# Patient Record
Sex: FEMALE | Race: WHITE | NOT HISPANIC OR LATINO | Employment: FULL TIME | URBAN - METROPOLITAN AREA
[De-identification: names, ages, dates, MRNs, and addresses within clinical notes are randomized per-mention and may not be internally consistent; named-entity substitution may affect disease eponyms.]

---

## 2018-10-26 PROBLEM — R41.82 ALTERED MENTAL STATUS: Status: ACTIVE | Noted: 2018-10-26

## 2018-10-26 NOTE — PLAN OF CARE
"Please call extension [phone] (if nobody answers, this will flip to a beeper, so put in your call back number) upon patient arrival to floor for Hospital Medicine admit team assignment and for additional admit orders for the patient.  Do not page the attending, staff physician associate with the patient on arrival (may not be in-house at the time of arrival).  Rather, always call [phone] to reach the triage physician for orders and team assignment.    Outside Transfer Acceptance Note    Patients name: Vivian Peterson, MRN: [id number]    Transferring Physician or Mid-Level provider/Clinic giving report:  Dr. Brown in [facility]    Accepting Physician for admission to hospital: Den Kidd M.D.     Date of acceptance:   10/26/18    Reason for transfer:  Transient alteration of consciousness    PMH: Hypothyroidism, DM2, "Questionable seizure disorder"    Report from Physician/Mid-Level Provider:     HPI: Ms. Peterson is a 47yo lady who has been developing periods of staring off into space with "catatonic episodes," syncope, and speech disturbances.  She holds a PHD in education and is normally very high functioning.  These symptoms all began in February of this year.  She has had about 4 major episodes since then.  She was admitted at [facility] with a work up last month including MRI brain, CT head and LP - all normal.  She takes Topamax at home.  The episodes last about 10 minutes on average.  She now comes with one of the episodes and is found to have waxing and waning speech and awareness there.  Dr. Guadalupe with Neurology was consulted and felt that she needed to come to [facility] for evaluation.    VS: T 98.2F, P 97, RR 18, /76, 97% RA    Labs: "ALL LABS NORMAL"    Radiographs: NON-CONTRAST HEAD: NML    To Do List upon arrival:    1) Consult Neurology  2) 24 hour VEEG      "

## 2018-10-27 ENCOUNTER — HOSPITAL ENCOUNTER (INPATIENT)
Facility: HOSPITAL | Age: 48
LOS: 9 days | Discharge: HOME | DRG: 312 | End: 2018-11-05
Attending: INTERNAL MEDICINE | Admitting: INTERNAL MEDICINE
Payer: OTHER GOVERNMENT

## 2018-10-27 DIAGNOSIS — R56.9 SEIZURE-LIKE ACTIVITY: ICD-10-CM

## 2018-10-27 DIAGNOSIS — R41.82 ALTERED MENTAL STATUS, UNSPECIFIED ALTERED MENTAL STATUS TYPE: ICD-10-CM

## 2018-10-27 DIAGNOSIS — I95.1 ORTHOSTATIC SYNCOPE: Primary | ICD-10-CM

## 2018-10-27 DIAGNOSIS — R55 SYNCOPE: ICD-10-CM

## 2018-10-27 DIAGNOSIS — R41.82 ALTERED MENTAL STATUS: ICD-10-CM

## 2018-10-27 DIAGNOSIS — R40.4 TRANSIENT ALTERATION OF AWARENESS: ICD-10-CM

## 2018-10-27 PROBLEM — R73.03 PREDIABETES: Chronic | Status: ACTIVE | Noted: 2018-10-27

## 2018-10-27 LAB
25(OH)D3+25(OH)D2 SERPL-MCNC: 35 NG/ML
ALBUMIN SERPL BCP-MCNC: 4.3 G/DL
ALP SERPL-CCNC: 130 U/L
ALT SERPL W/O P-5'-P-CCNC: 21 U/L
ANION GAP SERPL CALC-SCNC: 8 MMOL/L
AST SERPL-CCNC: 13 U/L
BASOPHILS # BLD AUTO: 0.02 K/UL
BASOPHILS NFR BLD: 0.3 %
BILIRUB SERPL-MCNC: 0.4 MG/DL
BUN SERPL-MCNC: 15 MG/DL
CALCIUM SERPL-MCNC: 9.5 MG/DL
CHLORIDE SERPL-SCNC: 112 MMOL/L
CO2 SERPL-SCNC: 19 MMOL/L
CREAT SERPL-MCNC: 0.8 MG/DL
DIFFERENTIAL METHOD: NORMAL
EOSINOPHIL # BLD AUTO: 0.1 K/UL
EOSINOPHIL NFR BLD: 1 %
ERYTHROCYTE [DISTWIDTH] IN BLOOD BY AUTOMATED COUNT: 12.9 %
EST. GFR  (AFRICAN AMERICAN): >60 ML/MIN/1.73 M^2
EST. GFR  (NON AFRICAN AMERICAN): >60 ML/MIN/1.73 M^2
ESTIMATED AVG GLUCOSE: 111 MG/DL
FOLATE SERPL-MCNC: 11.8 NG/ML
GLUCOSE SERPL-MCNC: 90 MG/DL
HBA1C MFR BLD HPLC: 5.5 %
HCT VFR BLD AUTO: 41.8 %
HGB BLD-MCNC: 13.8 G/DL
IMM GRANULOCYTES # BLD AUTO: 0.01 K/UL
IMM GRANULOCYTES NFR BLD AUTO: 0.2 %
LYMPHOCYTES # BLD AUTO: 2 K/UL
LYMPHOCYTES NFR BLD: 34.2 %
MAGNESIUM SERPL-MCNC: 2.2 MG/DL
MCH RBC QN AUTO: 29.8 PG
MCHC RBC AUTO-ENTMCNC: 33 G/DL
MCV RBC AUTO: 90 FL
MONOCYTES # BLD AUTO: 0.4 K/UL
MONOCYTES NFR BLD: 7.5 %
NEUTROPHILS # BLD AUTO: 3.3 K/UL
NEUTROPHILS NFR BLD: 56.8 %
NRBC BLD-RTO: 0 /100 WBC
PHOSPHATE SERPL-MCNC: 3.4 MG/DL
PLATELET # BLD AUTO: 179 K/UL
PMV BLD AUTO: 10.7 FL
POCT GLUCOSE: 138 MG/DL (ref 70–110)
POCT GLUCOSE: 73 MG/DL (ref 70–110)
POCT GLUCOSE: 79 MG/DL (ref 70–110)
POTASSIUM SERPL-SCNC: 3.8 MMOL/L
PROT SERPL-MCNC: 7.5 G/DL
RBC # BLD AUTO: 4.63 M/UL
RPR SER QL: NORMAL
SODIUM SERPL-SCNC: 139 MMOL/L
TSH SERPL DL<=0.005 MIU/L-ACNC: 2.11 UIU/ML
VIT B12 SERPL-MCNC: 438 PG/ML
WBC # BLD AUTO: 5.87 K/UL

## 2018-10-27 PROCEDURE — 85025 COMPLETE CBC W/AUTO DIFF WBC: CPT

## 2018-10-27 PROCEDURE — 99223 1ST HOSP IP/OBS HIGH 75: CPT | Mod: ,,, | Performed by: PHYSICIAN ASSISTANT

## 2018-10-27 PROCEDURE — A4216 STERILE WATER/SALINE, 10 ML: HCPCS | Performed by: PHYSICIAN ASSISTANT

## 2018-10-27 PROCEDURE — 84207 ASSAY OF VITAMIN B-6: CPT

## 2018-10-27 PROCEDURE — 11000001 HC ACUTE MED/SURG PRIVATE ROOM

## 2018-10-27 PROCEDURE — 36415 COLL VENOUS BLD VENIPUNCTURE: CPT

## 2018-10-27 PROCEDURE — 86146 BETA-2 GLYCOPROTEIN ANTIBODY: CPT

## 2018-10-27 PROCEDURE — 25500020 PHARM REV CODE 255: Performed by: INTERNAL MEDICINE

## 2018-10-27 PROCEDURE — 83735 ASSAY OF MAGNESIUM: CPT

## 2018-10-27 PROCEDURE — 84591 ASSAY OF NOS VITAMIN: CPT

## 2018-10-27 PROCEDURE — 25000003 PHARM REV CODE 250: Performed by: PHYSICIAN ASSISTANT

## 2018-10-27 PROCEDURE — 82746 ASSAY OF FOLIC ACID SERUM: CPT

## 2018-10-27 PROCEDURE — 99232 SBSQ HOSP IP/OBS MODERATE 35: CPT | Mod: ,,, | Performed by: INTERNAL MEDICINE

## 2018-10-27 PROCEDURE — 82607 VITAMIN B-12: CPT

## 2018-10-27 PROCEDURE — 80201 ASSAY OF TOPIRAMATE: CPT

## 2018-10-27 PROCEDURE — 84439 ASSAY OF FREE THYROXINE: CPT

## 2018-10-27 PROCEDURE — 80053 COMPREHEN METABOLIC PANEL: CPT

## 2018-10-27 PROCEDURE — 99223 1ST HOSP IP/OBS HIGH 75: CPT | Mod: ,,, | Performed by: PSYCHIATRY & NEUROLOGY

## 2018-10-27 PROCEDURE — 82306 VITAMIN D 25 HYDROXY: CPT

## 2018-10-27 PROCEDURE — 84443 ASSAY THYROID STIM HORMONE: CPT

## 2018-10-27 PROCEDURE — 84425 ASSAY OF VITAMIN B-1: CPT

## 2018-10-27 PROCEDURE — A9585 GADOBUTROL INJECTION: HCPCS | Performed by: INTERNAL MEDICINE

## 2018-10-27 PROCEDURE — 83036 HEMOGLOBIN GLYCOSYLATED A1C: CPT

## 2018-10-27 PROCEDURE — 86592 SYPHILIS TEST NON-TREP QUAL: CPT

## 2018-10-27 PROCEDURE — 84100 ASSAY OF PHOSPHORUS: CPT

## 2018-10-27 RX ORDER — TOPIRAMATE 100 MG/1
100 TABLET, FILM COATED ORAL DAILY
Status: ON HOLD | COMMUNITY
End: 2018-11-05 | Stop reason: HOSPADM

## 2018-10-27 RX ORDER — IBUPROFEN 200 MG
16 TABLET ORAL
Status: DISCONTINUED | OUTPATIENT
Start: 2018-10-27 | End: 2018-11-05 | Stop reason: HOSPADM

## 2018-10-27 RX ORDER — LOPERAMIDE HYDROCHLORIDE 2 MG/1
2 CAPSULE ORAL
Status: DISCONTINUED | OUTPATIENT
Start: 2018-10-27 | End: 2018-11-05 | Stop reason: HOSPADM

## 2018-10-27 RX ORDER — GLUCAGON 1 MG
1 KIT INJECTION
Status: DISCONTINUED | OUTPATIENT
Start: 2018-10-27 | End: 2018-11-05 | Stop reason: HOSPADM

## 2018-10-27 RX ORDER — INSULIN ASPART 100 [IU]/ML
0-5 INJECTION, SOLUTION INTRAVENOUS; SUBCUTANEOUS
Status: DISCONTINUED | OUTPATIENT
Start: 2018-10-27 | End: 2018-11-05 | Stop reason: HOSPADM

## 2018-10-27 RX ORDER — TOPIRAMATE 100 MG/1
150 TABLET, FILM COATED ORAL NIGHTLY
Status: ON HOLD | COMMUNITY
End: 2018-11-05 | Stop reason: HOSPADM

## 2018-10-27 RX ORDER — AMITRIPTYLINE HYDROCHLORIDE 100 MG/1
100 TABLET ORAL NIGHTLY
Status: ON HOLD | COMMUNITY
End: 2018-11-05 | Stop reason: HOSPADM

## 2018-10-27 RX ORDER — ASPIRIN 81 MG/1
81 TABLET ORAL DAILY
Status: DISCONTINUED | OUTPATIENT
Start: 2018-10-27 | End: 2018-11-05 | Stop reason: HOSPADM

## 2018-10-27 RX ORDER — OMEPRAZOLE 40 MG/1
40 CAPSULE, DELAYED RELEASE ORAL DAILY
COMMUNITY
End: 2023-03-02 | Stop reason: SDUPTHER

## 2018-10-27 RX ORDER — METFORMIN HYDROCHLORIDE 500 MG/1
500 TABLET ORAL 2 TIMES DAILY WITH MEALS
COMMUNITY
End: 2023-01-04

## 2018-10-27 RX ORDER — TOPIRAMATE 100 MG/1
100 TABLET, FILM COATED ORAL DAILY
Status: DISCONTINUED | OUTPATIENT
Start: 2018-10-27 | End: 2018-10-30

## 2018-10-27 RX ORDER — PROMETHAZINE HYDROCHLORIDE 25 MG/1
25 TABLET ORAL EVERY 6 HOURS PRN
Status: DISCONTINUED | OUTPATIENT
Start: 2018-10-27 | End: 2018-11-05 | Stop reason: HOSPADM

## 2018-10-27 RX ORDER — ASPIRIN 81 MG/1
81 TABLET ORAL DAILY
COMMUNITY

## 2018-10-27 RX ORDER — ATORVASTATIN CALCIUM 20 MG/1
20 TABLET, FILM COATED ORAL NIGHTLY
Status: DISCONTINUED | OUTPATIENT
Start: 2018-10-27 | End: 2018-11-05 | Stop reason: HOSPADM

## 2018-10-27 RX ORDER — POLYETHYLENE GLYCOL 3350 17 G/17G
17 POWDER, FOR SOLUTION ORAL 3 TIMES DAILY PRN
Status: DISCONTINUED | OUTPATIENT
Start: 2018-10-27 | End: 2018-11-05 | Stop reason: HOSPADM

## 2018-10-27 RX ORDER — AMITRIPTYLINE HYDROCHLORIDE 25 MG/1
100 TABLET, FILM COATED ORAL NIGHTLY
Status: DISCONTINUED | OUTPATIENT
Start: 2018-10-27 | End: 2018-11-02

## 2018-10-27 RX ORDER — LEVOTHYROXINE SODIUM 50 UG/1
50 TABLET ORAL
Status: DISCONTINUED | OUTPATIENT
Start: 2018-10-27 | End: 2018-11-05 | Stop reason: HOSPADM

## 2018-10-27 RX ORDER — IPRATROPIUM BROMIDE AND ALBUTEROL SULFATE 2.5; .5 MG/3ML; MG/3ML
3 SOLUTION RESPIRATORY (INHALATION) EVERY 4 HOURS PRN
Status: DISCONTINUED | OUTPATIENT
Start: 2018-10-27 | End: 2018-11-05 | Stop reason: HOSPADM

## 2018-10-27 RX ORDER — DOCUSATE SODIUM 100 MG/1
100 CAPSULE, LIQUID FILLED ORAL 2 TIMES DAILY
Status: DISCONTINUED | OUTPATIENT
Start: 2018-10-27 | End: 2018-11-05 | Stop reason: HOSPADM

## 2018-10-27 RX ORDER — PANTOPRAZOLE SODIUM 40 MG/1
40 TABLET, DELAYED RELEASE ORAL NIGHTLY
Status: DISCONTINUED | OUTPATIENT
Start: 2018-10-27 | End: 2018-10-29

## 2018-10-27 RX ORDER — LORATADINE 10 MG/1
10 TABLET ORAL DAILY
COMMUNITY
End: 2023-01-04

## 2018-10-27 RX ORDER — PANTOPRAZOLE SODIUM 40 MG/1
40 TABLET, DELAYED RELEASE ORAL DAILY
Status: DISCONTINUED | OUTPATIENT
Start: 2018-10-27 | End: 2018-11-05 | Stop reason: HOSPADM

## 2018-10-27 RX ORDER — IBUPROFEN 200 MG
24 TABLET ORAL
Status: DISCONTINUED | OUTPATIENT
Start: 2018-10-27 | End: 2018-11-05 | Stop reason: HOSPADM

## 2018-10-27 RX ORDER — CETIRIZINE HYDROCHLORIDE 5 MG/1
10 TABLET ORAL DAILY
Status: DISCONTINUED | OUTPATIENT
Start: 2018-10-27 | End: 2018-11-05 | Stop reason: HOSPADM

## 2018-10-27 RX ORDER — GADOBUTROL 604.72 MG/ML
7 INJECTION INTRAVENOUS
Status: COMPLETED | OUTPATIENT
Start: 2018-10-27 | End: 2018-10-27

## 2018-10-27 RX ORDER — ATORVASTATIN CALCIUM 20 MG/1
20 TABLET, FILM COATED ORAL NIGHTLY
COMMUNITY

## 2018-10-27 RX ORDER — MONTELUKAST SODIUM 10 MG/1
10 TABLET ORAL NIGHTLY
Status: DISCONTINUED | OUTPATIENT
Start: 2018-10-27 | End: 2018-11-05 | Stop reason: HOSPADM

## 2018-10-27 RX ORDER — CHOLECALCIFEROL (VITAMIN D3) 25 MCG
1000 TABLET ORAL DAILY
Status: DISCONTINUED | OUTPATIENT
Start: 2018-10-27 | End: 2018-11-05 | Stop reason: HOSPADM

## 2018-10-27 RX ORDER — ACETAMINOPHEN 325 MG/1
650 TABLET ORAL EVERY 4 HOURS PRN
Status: DISCONTINUED | OUTPATIENT
Start: 2018-10-27 | End: 2018-11-05 | Stop reason: HOSPADM

## 2018-10-27 RX ORDER — CHOLECALCIFEROL (VITAMIN D3) 25 MCG
1000 TABLET ORAL DAILY
COMMUNITY
End: 2023-01-04

## 2018-10-27 RX ORDER — MONTELUKAST SODIUM 10 MG/1
10 TABLET ORAL NIGHTLY
COMMUNITY
End: 2023-01-04

## 2018-10-27 RX ORDER — OMEPRAZOLE 20 MG/1
20 CAPSULE, DELAYED RELEASE ORAL NIGHTLY
Status: ON HOLD | COMMUNITY
End: 2018-11-05 | Stop reason: HOSPADM

## 2018-10-27 RX ORDER — ONDANSETRON 8 MG/1
8 TABLET, ORALLY DISINTEGRATING ORAL EVERY 8 HOURS PRN
Status: DISCONTINUED | OUTPATIENT
Start: 2018-10-27 | End: 2018-11-05 | Stop reason: HOSPADM

## 2018-10-27 RX ORDER — LEVOTHYROXINE SODIUM 50 UG/1
50 TABLET ORAL
COMMUNITY

## 2018-10-27 RX ORDER — SODIUM CHLORIDE 0.9 % (FLUSH) 0.9 %
3 SYRINGE (ML) INJECTION EVERY 8 HOURS
Status: DISCONTINUED | OUTPATIENT
Start: 2018-10-27 | End: 2018-11-05 | Stop reason: HOSPADM

## 2018-10-27 RX ADMIN — TOPIRAMATE 150 MG: 100 TABLET, FILM COATED ORAL at 08:10

## 2018-10-27 RX ADMIN — ACETAMINOPHEN 650 MG: 325 TABLET, FILM COATED ORAL at 09:10

## 2018-10-27 RX ADMIN — PANTOPRAZOLE SODIUM 40 MG: 40 TABLET, DELAYED RELEASE ORAL at 05:10

## 2018-10-27 RX ADMIN — ASPIRIN 81 MG: 81 TABLET, COATED ORAL at 09:10

## 2018-10-27 RX ADMIN — TOPIRAMATE 150 MG: 100 TABLET, FILM COATED ORAL at 05:10

## 2018-10-27 RX ADMIN — Medication 3 ML: at 05:10

## 2018-10-27 RX ADMIN — GADOBUTROL 7 ML: 604.72 INJECTION INTRAVENOUS at 06:10

## 2018-10-27 RX ADMIN — LEVOTHYROXINE SODIUM 50 MCG: 50 TABLET ORAL at 05:10

## 2018-10-27 RX ADMIN — PANTOPRAZOLE SODIUM 40 MG: 40 TABLET, DELAYED RELEASE ORAL at 08:10

## 2018-10-27 RX ADMIN — AMITRIPTYLINE HYDROCHLORIDE 100 MG: 25 TABLET, FILM COATED ORAL at 08:10

## 2018-10-27 RX ADMIN — PANTOPRAZOLE SODIUM 40 MG: 40 TABLET, DELAYED RELEASE ORAL at 09:10

## 2018-10-27 RX ADMIN — ACETAMINOPHEN 650 MG: 325 TABLET, FILM COATED ORAL at 08:10

## 2018-10-27 RX ADMIN — DOCUSATE SODIUM 100 MG: 100 CAPSULE, LIQUID FILLED ORAL at 08:10

## 2018-10-27 RX ADMIN — DOCUSATE SODIUM 100 MG: 100 CAPSULE, LIQUID FILLED ORAL at 09:10

## 2018-10-27 RX ADMIN — Medication 3 ML: at 02:10

## 2018-10-27 RX ADMIN — TOPIRAMATE 100 MG: 100 TABLET, FILM COATED ORAL at 09:10

## 2018-10-27 RX ADMIN — ATORVASTATIN CALCIUM 20 MG: 20 TABLET, FILM COATED ORAL at 05:10

## 2018-10-27 RX ADMIN — Medication 3 ML: at 08:10

## 2018-10-27 RX ADMIN — VITAMIN D, TAB 1000IU (100/BT) 1000 UNITS: 25 TAB at 09:10

## 2018-10-27 RX ADMIN — CETIRIZINE HYDROCHLORIDE 10 MG: 5 TABLET ORAL at 09:10

## 2018-10-27 RX ADMIN — THERA TABS 1 TABLET: TAB at 09:10

## 2018-10-27 RX ADMIN — MONTELUKAST SODIUM 10 MG: 10 TABLET, FILM COATED ORAL at 08:10

## 2018-10-27 RX ADMIN — ATORVASTATIN CALCIUM 20 MG: 20 TABLET, FILM COATED ORAL at 08:10

## 2018-10-27 RX ADMIN — MONTELUKAST SODIUM 10 MG: 10 TABLET, FILM COATED ORAL at 05:10

## 2018-10-27 NOTE — ASSESSMENT & PLAN NOTE
- Unclear etiology at this time.  - Work-ups during previous hospitalizations, including EEG, CT head, MRI brain, and LP, all unremarkable.  - UA, urine tox screen at OSH negative.  - Will consult neurology, order EEG and labs.  - Fall precautions, seizure precautions, PT consult.  - Continue Topamax 100mg daily and 150mg qHS.  Will check levels.

## 2018-10-27 NOTE — MEDICAL/APP STUDENT
Hospital Medicine  Progress note    Patient Name: Vivian Peterson  MRN: [id number]  Team: [facility] D Elaina Dillon MD  Admit Date: 10/27/2018  Code status: Full Code    Principal Problem:  Transient alteration of awareness    Interval hx: Patient with no new complaints.    ROS   Respiratory: no cough or shortness of breath  Cardiovascular: no chest pain or palpitations  Gastrointestinal: no nausea or vomiting, no abdominal pain or change in bowel habits  Behavioral/Psych: no depression or anxiety      PEx  Temp:  [97.9 °F (36.6 °C)-98.7 °F (37.1 °C)]   Pulse:  [86-97]   Resp:  [14-18]   BP: (106-117)/(64-82)   SpO2:  [95 %-99 %]     Intake/Output Summary (Last 24 hours) at 10/27/2018 0924  Last data filed at 10/27/2018 0600  Gross per 24 hour   Intake 125 ml   Output 200 ml   Net -75 ml       General Appearance: no acute distress   Heart: regular rate and rhythm  Respiratory: Normal respiratory effort, no crackles   Abdomen: Soft, non-tender; bowel sounds active  Skin: intact.   Neurologic:  No focal numbness or weakness  Mental status: Alert. Delayed responses.     Recent Labs   Lab 10/27/18  0630   WBC 5.87   HGB 13.8   HCT 41.8        Recent Labs   Lab 10/27/18  0630      K 3.8   *   CO2 19*   BUN 15   CREATININE 0.8   GLU 90   CALCIUM 9.5   MG 2.2   PHOS 3.4     Recent Labs   Lab 10/27/18  0630   ALKPHOS 130   ALT 21   AST 13   ALBUMIN 4.3   PROT 7.5   BILITOT 0.4      Recent Labs   Lab 10/27/18  0537   POCTGLUCOSE 79       Scheduled Meds:   amitriptyline  100 mg Oral QHS    aspirin  81 mg Oral Daily    atorvastatin  20 mg Oral QHS    cetirizine  10 mg Oral Daily    docusate sodium  100 mg Oral BID    levothyroxine  50 mcg Oral Before breakfast    montelukast  10 mg Oral QHS    multivitamin  1 tablet Oral Daily    pantoprazole  40 mg Oral QHS    pantoprazole  40 mg Oral Daily    sodium chloride 0.9%  3 mL Intravenous Q8H    topiramate  100 mg Oral Daily    topiramate  150 mg  "Oral QHS    vitamin D  1,000 Units Oral Daily     Continuous Infusions:  As Needed:  acetaminophen, albuterol-ipratropium, dextrose 50%, dextrose 50%, glucagon (human recombinant), glucose, glucose, insulin aspart U-100, loperamide, ondansetron, polyethylene glycol, promethazine    Active Hospital Problems    Diagnosis  POA    *Transient alteration of awareness [R40.4]  Yes    Prediabetes [R73.03]  Yes     Chronic    Hypothyroidism due to Hashimoto's thyroiditis [E03.8, E06.3]  Yes     Chronic    HLD (hyperlipidemia) [E78.5]  Yes     Chronic    GERD (gastroesophageal reflux disease) [K21.9]  Yes     Chronic    Asthma [J45.909]  Yes     Chronic    Migraines [G43.909]  Yes     Chronic    MAURO (obstructive sleep apnea) [G47.33]  Yes      Resolved Hospital Problems   No resolved problems to display.       Overview  48 year old female with a h/o migraines, prediabetes on Metformin, GERD, asthma, HLD, hypothyroidism secondary to Hashimoto's, and MAURO who transferred from [facility] where she presented with L sided weakness, numbness, and "staring spells." Of note, patient's levothyroxine was discontinued in February and admitted for thyroid storm, after which levothyroxine was resumed with persisting mental status changes. Previous work-up at OSH reportedly included unremarkable MRI brain, CTH, EEG and LP.      Assessment and Plan for Problems addressed today:    Transient alteration of awareness  · Unclear etiology at this time.  · Work-ups during previous hospitalizations, reportedly included EEG, CT head, MRI brain, and LP, all unremarkable. UA, urine tox screen at OSH negative.  · Fall precautions, seizure precautions, PT/OT ordered.  · Topiramate levels ordered.    · Vit B12/Folate & Vit D WNL. RPR, B1, B3, B6 pending.    · Consulted neurology, ordered EEG.    Migraines  · Continued home amitriptyline 100mg qHS and topiramate 100mg daily and 150mg qHS.    Hypothyroidism due to Hashimoto's " thyroiditis  · Continued home levothyroxine 50mcg.  · TSH, T4 wnl.     Prediabetes  · Patient has not been taking her Metformin as she is waiting on a new prescription to come in the mail.  · HA1c: 5.5%   · Low dose insulin correction ordered.     Asthma  MAURO (obstructive sleep apnea)  · CPAP qHS.  · Continued home Singulair & montelukast.     Hyperlipidemia  GERD (gastroesophageal reflux disease)  · Continued home Lipitor 20mg qHS & pantoprazole 40mg daily.       Diet: Diet diabetic [facility]; 1500 Calorie  DVT Prophylaxis: TEDs/SCDs  Anticoagulants   Medication Route Frequency       L/D/A:  PIV    Discharge plan and follow up  Home or Self Care      Provider  Elaina Dillon MD  [facility]   Department of Hospital Medicine    Scribe Attestation: I personally scribed for Elaina Dillon MD on 10/27/2018 at 9:24 AM. Electronically signed by radha Macario on 10/27/2018 at 9:24 AM.

## 2018-10-27 NOTE — ASSESSMENT & PLAN NOTE
"49 y/o female presenting as a transfer from [facility] with unusual presentation of intermittent episodes over a span of 7 months involving multiple neurologic deficits (abnormal gait ranging from mild to severe/unable to walk, speech difficulty, "staring spells" closely described as a catatonic state?, syncope, decreased concentration/memory lapses). Patient has had an extensive work-up including imaging since her initial episode in February 2018; thus far, no abnormal findings have been reported. Differential diagnosis includes: epilepsy, recurrent seizures 2/2 underlying infectious process or metabolic derangement, neurodegenerative disorder, recurrent strokes. While always hesitant to claim as a diagnosis of exclusion, conversion disorder needs to be considered in this patient given her unusual presentation and inconsistent findings on physical exam, negative findings despite appropriate neurologic work-up so far, and her high-functioning baseline status.    Recommendations:  - repeat MRI Brain (as transfer records did not come with prior imaging other than head CT)  - MRI cervical and thoracic spine  - continuous 24 hr video-recording EEG   - No need for repeat lumbar puncture at this time as patient had negative results in September  - Fall precautions, given patient's severely unsteady gait  - Will continue to monitor for further management  - Feel free to reach out for further questions  "

## 2018-10-27 NOTE — CONSULTS
"[facility]  Neurology  Consult Note    Patient Name: Vivian Peterson  MRN: [id number]  Admission Date: 10/27/2018  Hospital Length of Stay: 0 days  Code Status: Full Code   Attending Provider: Elaina Dillon MD   Consulting Provider: Micah Schwartz MD  Primary Care Physician: Provider Notinsystem  Principal Problem:Transient alteration of awareness    Consults   Subjective:     Chief Complaint:  Transient alterations in awareness     HPI:   Patient is a 49 y/o female with a h/o migraines (on Topamax), prediabetes on Metformin, GERD, asthma, HLD, hypothyroidism secondary to Hashimoto's, and MAURO.  She was transferred from [facility] where she presented with L sided weakness, numbness, and "staring spells."   at bedside assists with HPI.  Patient began having episodes of L sided weakness, gait disturbance (L>R), tremors, "catatonic episodes," and syncope in February. At the time of her first episode, patient was living in [place]  had been abruptly taken off her thyroid medication by her endocrinologist.  She  presented then with acute L-sided weakness, numbness, and dysphasia and was admitted to the hospital for a presumed stroke. CT head/MRI was negative for any infarct, but patient proceeded to become aphasic and went into a catatonic state for nearly 45 minutes in which she was unresponsive and unable to follow commands before slowly returning to baseline over a few days. Due to an unclear etiology, patient's symptoms were thought to be due to a thyroid storm.  On discharge she was restarted on her home thyroid medication.  However, patient continued to intermittently have episodes in addition to intermittent worsening of her gait and speech difficulty. She  was being followed by neurology, but her neurologist moved and she was lost to follow-up. She has had multiple episodes since then, each lasting 10-20 minutes, and yet for a while had been able to return to work as " "a  from August-mid October. Her most recent episode was on September 22, when she presented to [facility] with syncope and gait disturbance.  Her work-up, including MRI brain, CT head, and LP, was unremarkable.  She was discharged on a higher dose of Topamax (100mg qAM and 150mg qHS).   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 She had another reported episode while in the ER prior to transfer. On exam, patient is oriented to situation, place, and time but with notably delayed responses.  When asked her full name, she is unable to answer; however she is able to answer if prompted for them separately. She denies chest pain, complains of chronic SOB secondary to asthma.  Denies dizziness, palpitations, fever/chills, N/V/D, abdominal pain.    General neurology was consulted for further work up and management for these apparent transient alterations in consciousness/intermittent neurologic deficits. It sounds like patient has already had extensive work-up over the past few months for this same presentation including imaging with CT Head, Brain MRI, multiple EEGs, and LP. No clear etiology has been discovered up to this point. [place] [facility] patient is hemodynamically stable. Lab work thus far has been grossly unremarkable. UA, urine toxicology screen at OSH wnl. TSH, Vit B12, and folate WNL.  RPR non-reactive. Vitamin B1, B3, B6 levels all pending. Transfer records only came with recent CT Head, thus will plan for continuous 24 hr EEG in hopes of picking up one of patient's "episodes", MRI Brain, MRI cervical and thoracic spine in attempt to identify definitve etiology.       Past Medical History:   Diagnosis Date    Asthma     GERD (gastroesophageal reflux disease)     HLD (hyperlipidemia)     " Hypothyroidism due to Hashimoto's thyroiditis     Migraines     MAURO (obstructive sleep apnea)     Prediabetes        Past Surgical History:   Procedure Laterality Date    SHOULDER SURGERY Left        Review of patient's allergies indicates:   Allergen Reactions    Penicillins Anaphylaxis    Sulfa (sulfonamide antibiotics) Anaphylaxis    Clarithromycin        Current Neurological Medications: Topamax    No current facility-administered medications on file prior to encounter.      Current Outpatient Medications on File Prior to Encounter   Medication Sig    amitriptyline (ELAVIL) 100 MG tablet Take 100 mg by mouth every evening.    aspirin (ECOTRIN) 81 MG EC tablet Take 81 mg by mouth once daily.    atorvastatin (LIPITOR) 20 MG tablet Take 20 mg by mouth every evening.    levothyroxine (SYNTHROID) 50 MCG tablet Take 50 mcg by mouth before breakfast.    linaclotide (LINZESS) 290 mcg Cap Take 290 mcg by mouth every other day.    loratadine (CLARITIN) 10 mg tablet Take 10 mg by mouth once daily.    metFORMIN (GLUCOPHAGE) 500 MG tablet Take 500 mg by mouth 2 (two) times daily with meals.    montelukast (SINGULAIR) 10 mg tablet Take 10 mg by mouth every evening.    multivitamin capsule Take 1 capsule by mouth once daily.    omeprazole (PRILOSEC) 20 MG capsule Take 20 mg by mouth every evening.    omeprazole (PRILOSEC) 40 MG capsule Take 40 mg by mouth once daily.    topiramate (TOPAMAX) 100 MG tablet Take 100 mg by mouth once daily.    topiramate (TOPAMAX) 100 MG tablet Take 150 mg by mouth every evening.    vitamin D (VITAMIN D3) 1000 units Tab Take 1,000 Units by mouth once daily.     Family History     Problem Relation (Age of Onset)    Cancer Mother        Tobacco Use    Smoking status: Never Smoker   Substance and Sexual Activity    Alcohol use: No     Frequency: Never    Drug use: Not on file    Sexual activity: Not on file     Review of Systems   Constitutional: Positive for activity change.  "Negative for chills, fatigue, fever and unexpected weight change.   HENT: Negative for mouth sores, sinus pain and sore throat.    Eyes: Negative for pain and visual disturbance.   Respiratory: Negative for cough, chest tightness and shortness of breath.    Cardiovascular: Negative for chest pain and palpitations.   Gastrointestinal: Negative for abdominal distention, abdominal pain, constipation, diarrhea, nausea and vomiting.   Genitourinary: Negative for difficulty urinating and dysuria.   Musculoskeletal: Positive for gait problem. Negative for back pain, myalgias, neck pain and neck stiffness.   Neurological: Positive for syncope, speech difficulty, weakness and numbness.        "staring spells", difficulty w/ memory   Psychiatric/Behavioral: Positive for confusion and decreased concentration. Negative for agitation.     Objective:     Vital Signs (Most Recent):  Temp: 97.9 °F (36.6 °C) (10/27/18 0422)  Pulse: 93 (10/27/18 1229)  Resp: 17 (10/27/18 1229)  BP: 119/83 (10/27/18 1229)  SpO2: 95 % (10/27/18 1229) Vital Signs (24h Range):  Temp:  [97.9 °F (36.6 °C)-98.7 °F (37.1 °C)] 97.9 °F (36.6 °C)  Pulse:  [86-97] 93  Resp:  [14-18] 17  SpO2:  [95 %-99 %] 95 %  BP: (106-119)/(64-83) 119/83     Weight: 67 kg (147 lb 11.3 oz)  Body mass index is 28.85 kg/m².    Physical Exam   Constitutional: She is oriented to person, place, and time. She appears well-developed and well-nourished. No distress.   HENT:   Head: Normocephalic and atraumatic.   Mouth/Throat: Oropharynx is clear and moist.   Eyes: Conjunctivae and EOM are normal. Pupils are equal, round, and reactive to light.   Neck: Normal range of motion. Neck supple.   Cardiovascular: Normal rate, regular rhythm and intact distal pulses.   Pulmonary/Chest: Effort normal and breath sounds normal.   Abdominal: Soft. Bowel sounds are normal. She exhibits no distension. There is no tenderness.   Musculoskeletal:        Right shoulder: She exhibits decreased range of " motion.        Left shoulder: She exhibits decreased range of motion.   Neurological: She is oriented to person, place, and time. She has an abnormal Finger-Nose-Finger Test.   Reflex Scores:       Bicep reflexes are 1+ on the right side and 1+ on the left side.       Brachioradialis reflexes are 1+ on the right side and 1+ on the left side.       Patellar reflexes are 1+ on the right side and 1+ on the left side.       Achilles reflexes are 1+ on the right side and 1+ on the left side.      NEUROLOGICAL EXAMINATION:     MENTAL STATUS   Oriented to person, place, and time.   Recall of objects at 5 minutes: 0/3 on word recall. Follows commands: yes but with delayed response.   Attention: normal. Concentration: decreased (could not spell world backwards).   Speech: (Slowed)  General knowledge: patient is normally high functioning, has a master's in special education.     CRANIAL NERVES     CN III, IV, VI   Pupils are equal, round, and reactive to light.  Extraocular motions are normal.     CN V   Right facial sensation deficit: none  Left facial sensation deficit: forehead, cheeks and mandible (less sensitive to light touch on left side)    CN VII   Facial expression full, symmetric.     CN IX, X   CN IX normal.   CN X normal.     CN XI   CN XI normal.   Right sternocleidomastoid strength: weak  Left sternocleidomastoid strength: weak    CN XII   CN XII normal.     MOTOR EXAM   Right arm tone: spastic  Left arm tone: spastic  Right arm pronator drift: absent  Left arm pronator drift: absent    Strength   Right deltoid: 4/5  Left deltoid: 4/5  Right biceps: 4/5  Left biceps: 4/5  Right triceps: 4/5  Left triceps: 4/5  Right wrist flexion: 4/5  Left wrist flexion: 4/5  Right wrist extension: 4/5  Left wrist extension: 4/5  Right interossei: 4/5  Left interossei: 4/5  Right quadriceps: 3/5  Left quadriceps: 3/5  Right hamstring: 3/5  Left hamstring: 3/5  Right anterior tibial: 3/5  Left anterior tibial: 3/5  Right  posterior tibial: 3/5  Left posterior tibial: 3/5  Right peroneal: 3/5  Left peroneal: 3/5  Right gastroc: 3/5  Left gastroc: 3/5       All of her movements seemed very slowed/delayed. When asked to phonate, she could appropriately repsond     REFLEXES     Reflexes   Right brachioradialis: 1+  Left brachioradialis: 1+  Right biceps: 1+  Left biceps: 1+  Right patellar: 1+  Left patellar: 1+  Right achilles: 1+  Left achilles: 1+  Right plantar: equivocal  Left plantar: equivocal    SENSORY EXAM   Light touch normal.     GAIT AND COORDINATION     Gait  Gait: spastic and wide-based     Coordination   Finger to nose coordination: abnormal    Tremor   Action tremor: right arm and left arm      Significant Labs:   CBC:   Recent Labs   Lab 10/27/18  0630   WBC 5.87   HGB 13.8   HCT 41.8        CMP:   Recent Labs   Lab 10/27/18  0630   GLU 90      K 3.8   *   CO2 19*   BUN 15   CREATININE 0.8   CALCIUM 9.5   MG 2.2   PROT 7.5   ALBUMIN 4.3   BILITOT 0.4   ALKPHOS 130   AST 13   ALT 21   ANIONGAP 8   EGFRNONAA >60.0     Recent Lab Results       10/27/18  0631   10/27/18  0630   10/27/18  0537        Immature Granulocytes   0.2       Immature Grans (Abs)   0.01  Comment:  Mild elevation in immature granulocytes is non specific and   can be seen in a variety of conditions including stress response,   acute inflammation, trauma and pregnancy. Correlation with other   laboratory and clinical findings is essential.         Albumin   4.3       Alkaline Phosphatase   130       ALT   21       Anion Gap   8       AST   13       Baso #   0.02       Basophil%   0.3       Total Bilirubin   0.4  Comment:  For infants and newborns, interpretation of results should be based  on gestational age, weight and in agreement with clinical  observations.  Premature Infant recommended reference ranges:  Up to 24 hours.............<8.0 mg/dL  Up to 48 hours............<12.0 mg/dL  3-5 days..................<15.0 mg/dL  6-29  days.................<15.0 mg/dL         BUN, Bld   15       Calcium   9.5       Chloride   112       CO2   19       Creatinine   0.8       Differential Method   Automated       eGFR if    >60.0       eGFR if non    >60.0  Comment:  Calculation used to obtain the estimated glomerular filtration  rate (eGFR) is the CKD-EPI equation.          Eos #   0.1       Eosinophil%   1.0       Estimated Avg Glucose 111         Folate   11.8       Glucose   90       Gran # (ANC)   3.3       Gran%   56.8       Hematocrit   41.8       Hemoglobin   13.8       Hemoglobin A1C 5.5  Comment:  ADA Screening Guidelines:  5.7-6.4%  Consistent with prediabetes  >or=6.5%  Consistent with diabetes  High levels of fetal hemoglobin interfere with the HbA1C  assay. Heterozygous hemoglobin variants (HbS, HgC, etc)do  not significantly interfere with this assay.   However, presence of multiple variants may affect accuracy.           Lymph #   2.0       Lymph%   34.2       Magnesium   2.2       MCH   29.8       MCHC   33.0       MCV   90       Mono #   0.4       Mono%   7.5       MPV   10.7       nRBC   0       Phosphorus   3.4       Platelets   179       POCT Glucose     79     Potassium   3.8       Total Protein   7.5       RBC   4.63       RDW   12.9       RPR   Non-reactive       Sodium   139       TSH   2.109       Vit D, 25-Hydroxy   35  Comment:  Vitamin D deficiency.........<10 ng/mL                              Vitamin D insufficiency......10-29 ng/mL       Vitamin D sufficiency........> or equal to 30 ng/mL  Vitamin D toxicity............>100 ng/mL         Vitamin B-12   438       WBC   5.87             Significant Imaging: I have reviewed all pertinent imaging results/findings within the past 24 hours.    Assessment and Plan:     * Transient alteration of awareness    47 y/o female presenting as a transfer from OSH with unusual presentation of intermittent episodes over a span of 7 months involving  "multiple neurologic deficits (abnormal gait ranging from mild to severe/unable to walk, speech difficulty, "staring spells" closely described as a catatonic state?, syncope, decreased concentration/memory lapses). Patient has had an extensive work-up including imaging since her initial episode in February 2018; thus far, no abnormal findings have been reported. Differential diagnosis includes: epilepsy, recurrent seizures 2/2 underlying infectious process or metabolic derangement, neurodegenerative disorder, recurrent strokes. While always hesitant to claim as a diagnosis of exclusion, conversion disorder needs to be considered in this patient given her unusual presentation and inconsistent findings on physical exam, negative findings despite appropriate neurologic work-up so far, and her high-functioning baseline status.    Recommendations:  - repeat MRI Brain (as transfer records did not come with prior imaging other than head CT)  - MRI cervical and thoracic spine  - continuous 24 hr video-recording EEG   - No need for repeat lumbar puncture at this time as patient had negative results in September  - Fall precautions, given patient's severely unsteady gait  - Will continue to monitor for further management  - Feel free to reach out for further questions         VTE Risk Mitigation (From admission, onward)        Ordered     Place sequential compression device  Until discontinued      10/27/18 0515     Place HERBIE hose  Until discontinued      10/27/18 0515     IP VTE HIGH RISK PATIENT  Once      10/27/18 0515          Thank you for your consult. I will follow-up with patient. Please contact us if you have any additional questions.    Micah Schwartz MD  Neurology  [facility]  "

## 2018-10-27 NOTE — H&P
"[facility] Hospital Medicine  History & Physical    Patient Name: Vivian Peterson  MRN: [id number]  Admission Date: 10/27/2018  Attending Physician: JOSSY Kidd MD   Primary Care Provider: Provider [facility] Medicine Team: Networked reference to record PCT  Ines Gillette PA-C     Patient information was obtained from patient, spouse/SO, past medical records and ER records.     Subjective:     Principal Problem:Transient alteration of awareness    Chief Complaint:   Chief Complaint   Patient presents with    Altered Mental Status        HPI: Patient is a 48 year old lady with a h/o migraines, prediabetes on Metformin, GERD, asthma, HLD, hypothyroidism secondary to Hashimoto's, and MAURO.  She was transferred from [facility] where she presented with L sided weakness, numbness, and "staring spells."   at bedside assists with HPI.  Patient began having episodes of L sided weakness, gait disturbance (L>R), tremors, "catatonic episodes," and syncope in February.  Patient was living in [place] at that time and had been taken off her thyroid medications by her PCP.  She was admitted to the hospital and was told she had thyroid storm.  On discharge she was restarted on her medication.  However, patient continued to have episodes.  She was seen by neurology, but her neurologist moved and she was lost to follow-up.  She has had four episodes since that time.  Her most recent episode was on September 22, when she presented to [facility] with syncope and gait disturbance.  Her work-up, including MRI brain, CT head, and LP, was unremarkable.  She was discharged on a higher dose of Topamax (100mg qAM and 150mg qHS).  This evening she presented to OSH with abnormal gait and episodes of "staring off" when she is not able to communicate.  Episodes last about ten minutes.  She had one event while in the ER prior to transfer.  On exam, patient is " oriented to situation, place, and time.  When asked her full name, she is unable to answer; however she was able to answer when asked what her first name was and what her last name was separately.  She denies chest pain, complains of chronic SOB secondary to asthma.  Denies dizziness, palpitations, fever/chills, N/V/D, abdominal pain.          Past Medical History:   Diagnosis Date    Asthma     GERD (gastroesophageal reflux disease)     HLD (hyperlipidemia)     Hypothyroidism due to Hashimoto's thyroiditis     Migraines     MAURO (obstructive sleep apnea)     Prediabetes        Past Surgical History:   Procedure Laterality Date    SHOULDER SURGERY Left        Review of patient's allergies indicates:   Allergen Reactions    Penicillins Anaphylaxis    Sulfa (sulfonamide antibiotics) Anaphylaxis    Clarithromycin        No current facility-administered medications on file prior to encounter.      Current Outpatient Medications on File Prior to Encounter   Medication Sig    amitriptyline (ELAVIL) 100 MG tablet Take 100 mg by mouth every evening.    aspirin (ECOTRIN) 81 MG EC tablet Take 81 mg by mouth once daily.    atorvastatin (LIPITOR) 20 MG tablet Take 20 mg by mouth every evening.    levothyroxine (SYNTHROID) 50 MCG tablet Take 50 mcg by mouth before breakfast.    linaclotide (LINZESS) 290 mcg Cap Take 290 mcg by mouth every other day.    loratadine (CLARITIN) 10 mg tablet Take 10 mg by mouth once daily.    metFORMIN (GLUCOPHAGE) 500 MG tablet Take 500 mg by mouth 2 (two) times daily with meals.    montelukast (SINGULAIR) 10 mg tablet Take 10 mg by mouth every evening.    multivitamin capsule Take 1 capsule by mouth once daily.    omeprazole (PRILOSEC) 20 MG capsule Take 20 mg by mouth every evening.    omeprazole (PRILOSEC) 40 MG capsule Take 40 mg by mouth once daily.    topiramate (TOPAMAX) 100 MG tablet Take 100 mg by mouth once daily.    topiramate (TOPAMAX) 100 MG tablet Take 150 mg by  "mouth every evening.    vitamin D (VITAMIN D3) 1000 units Tab Take 1,000 Units by mouth once daily.     Family History     Problem Relation (Age of Onset)    Cancer Mother        Tobacco Use    Smoking status: Never Smoker   Substance and Sexual Activity    Alcohol use: No     Frequency: Never    Drug use: Not on file    Sexual activity: Not on file     Review of Systems   Constitutional: Positive for activity change. Negative for appetite change, chills, fatigue, fever and unexpected weight change.   HENT: Negative for congestion, rhinorrhea, sore throat, trouble swallowing and voice change.    Eyes: Negative for visual disturbance.   Respiratory: Positive for shortness of breath (Chronic and unchanged). Negative for cough, choking, chest tightness and wheezing.    Cardiovascular: Negative for chest pain, palpitations and leg swelling.   Gastrointestinal: Negative for abdominal distention, abdominal pain, anal bleeding, blood in stool, constipation, diarrhea, nausea and vomiting.   Endocrine: Negative for cold intolerance, heat intolerance, polydipsia and polyuria.   Genitourinary: Negative for dysuria, flank pain, frequency, hematuria and urgency.   Musculoskeletal: Positive for gait problem. Negative for arthralgias, back pain, joint swelling and myalgias.   Skin: Negative for color change and rash.   Neurological: Positive for speech difficulty and weakness. Negative for dizziness, seizures, syncope, facial asymmetry, light-headedness, numbness and headaches.        "Staring spells"   Hematological: Negative for adenopathy. Does not bruise/bleed easily.   Psychiatric/Behavioral: Positive for confusion. Negative for agitation, hallucinations and suicidal ideas.     Objective:     Vital Signs (Most Recent):  Temp: 97.9 °F (36.6 °C) (10/27/18 0422)  Pulse: 86 (10/27/18 0422)  Resp: 14 (10/27/18 0422)  BP: 107/75 (10/27/18 0422)  SpO2: 99 % (10/27/18 0422) Vital Signs (24h Range):  Temp:  [97.9 °F (36.6 " °C)-98.7 °F (37.1 °C)] 97.9 °F (36.6 °C)  Pulse:  [86-97] 86  Resp:  [14-18] 14  SpO2:  [95 %-99 %] 99 %  BP: (106-108)/(64-76) 107/75     Weight: 67 kg (147 lb 11.3 oz)  Body mass index is 28.85 kg/m².    Physical Exam   Constitutional: She appears well-developed and well-nourished. No distress.   HENT:   Head: Normocephalic and atraumatic.   Eyes: Pupils are equal, round, and reactive to light.   Neck: Neck supple. Carotid bruit is not present. No thyromegaly present.   Cardiovascular: Normal rate and regular rhythm. Exam reveals no gallop.   No murmur heard.  Pulmonary/Chest: Effort normal and breath sounds normal. No respiratory distress. She has no wheezes.   Abdominal: Soft. Bowel sounds are normal. She exhibits no distension and no mass. There is no splenomegaly or hepatomegaly. There is no tenderness.   Musculoskeletal: Normal range of motion. She exhibits no edema or deformity.   Neurological: She is alert. No cranial nerve deficit or sensory deficit. GCS eye subscore is 4. GCS verbal subscore is 5. GCS motor subscore is 6.   Oriented to place, time, and situation.  Unable to answer when asked full name.  Able to correctly answer when asked separately for her first and last name.  Speech slowed, negative pronator drift   Skin: Skin is warm and dry. No rash noted.   Psychiatric: She has a normal mood and affect. Her speech is delayed.         CRANIAL NERVES     CN III, IV, VI   Pupils are equal, round, and reactive to light.       Significant Labs: None    Significant Imaging: I have reviewed all pertinent imaging results/findings within the past 24 hours.    Assessment/Plan:     * Transient alteration of awareness    - Unclear etiology at this time.  - Work-ups during previous hospitalizations, including EEG, CT head, MRI brain, and LP, all unremarkable.  - UA, urine tox screen at OSH negative.  - Will consult neurology, order EEG and labs.  - Fall precautions, seizure precautions, PT consult.  - Continue  Topamax 100mg daily and 150mg qHS.  Will check levels.         Migraines    - Continue Elavil 100mg qHS.       Asthma    - Continue Singulair.       GERD (gastroesophageal reflux disease)    - Protonix.       HLD (hyperlipidemia)    - Continue Lipitor 20mg qHS.       Hypothyroidism due to Hashimoto's thyroiditis    - Continue levothyroxine 50mcg.  - Will check TSH, FT4.       Prediabetes    - Patient has not been taking her Metformin as she is waiting on a new prescription to come in the mail.  - HgbA1c.  - Diabetic diet (once BONG passed) and SSI.       MAURO (obstructive sleep apnea)    - CPAP qHS.         VTE Risk Mitigation (From admission, onward)        Ordered     Place sequential compression device  Until discontinued      10/27/18 0515     Place HERBIE hose  Until discontinued      10/27/18 0515     IP VTE HIGH RISK PATIENT  Once      10/27/18 0515             Ines Gillette PA-C  Department of Hospital Medicine   [facility]

## 2018-10-27 NOTE — HPI
"Patient is a 47 y/o female with a h/o migraines (on Topamax), prediabetes on Metformin, GERD, asthma, HLD, hypothyroidism secondary to Hashimoto's, and MAURO.  She was transferred from [facility] where she presented with L sided weakness, numbness, and "staring spells."   at bedside assists with HPI.  Patient began having episodes of L sided weakness, gait disturbance (L>R), tremors, "catatonic episodes," and syncope in February. At the time of her first episode, patient was living in [place]  had been abruptly taken off her thyroid medication by her endocrinologist.  She presented then with acute L-sided weakness, numbness, and dysphasia and was admitted to the hospital for a presumed stroke. CT head/MRI was negative for any infarct, but patient proceeded to become aphasic and went into a catatonic state for nearly 45 minutes in which she was unresponsive and unable to follow commands before slowly returning to baseline over a few days. Due to an unclear etiology, patient's symptoms were thought to be due to a thyroid storm.  On discharge she was restarted on her home thyroid medication.  However, patient continued to intermittently have episodes in addition to intermittent worsening of her gait and speech difficulty. She was being followed by neurology, but her neurologist moved and she was lost to follow-up. She has had multiple episodes since then, each lasting 10-20 minutes, and yet for a while had been able to return to work as a  from August-mid October. Her most recent episode was on September 22, when she presented to [facility] with syncope and gait disturbance.  Her work-up, including MRI brain, CT head, and LP, was unremarkable.  She was discharged on a higher dose of Topamax (100mg qAM and 150mg qHS).  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 She had another reported episode while in the ER prior to transfer. On exam, patient is oriented to situation, place, and time but with notably delayed responses.  When asked her full name, she is unable to answer; however she is able to answer if prompted for them separately. She denies chest pain, complains of chronic SOB secondary to asthma.  Denies dizziness, palpitations, fever/chills, N/V/D, abdominal pain.    General neurology was consulted for further work up and management for these apparent transient alterations in consciousness/intermittent neurologic deficits. It sounds like patient has already had extensive work-up over the past few months for this same presentation including imaging with CT Head, Brain MRI, multiple EEGs, and LP. No clear etiology has been discovered up to this point. Upon arrival to [facility] ER, patient is hemodynamically stable. Lab work thus far has been grossly unremarkable. UA, urine toxicology screen at OSH wnl. TSH, Vit B12, and folate WNL.  RPR non-reactive. Vitamin B1, B3, B6 levels all pending. Transfer records only came with recent CT Head, thus will plan for continuous 24 hr EEG in hopes of picking up one of patient's "episodes", MRI Brain, MRI cervical and thoracic spine in attempt to identify definitve etiology.    "

## 2018-10-27 NOTE — HPI
"Patient is a 48 year old lady with a h/o migraines, prediabetes on Metformin, GERD, asthma, HLD, hypothyroidism secondary to Hashimoto's, and MAURO.  She was transferred from [facility] where she presented with L sided weakness, numbness, and "staring spells."   at bedside assists with HPI.  Patient began having episodes of L sided weakness, gait disturbance (L>R), tremors, "catatonic episodes," and syncope in February.  Patient was living in [place] at that time and had been taken off her thyroid medications by her PCP.  She was admitted to the hospital and was told she had thyroid storm.  On discharge she was restarted on her medication.  However, patient continued to have episodes.  She was seen by neurology, but her neurologist moved and she was lost to follow-up.  She has had four episodes since that time.  Her most recent episode was on September 22, when she presented to [facility] with syncope and gait disturbance.  Her work-up, including MRI brain, CT head, and LP, was unremarkable.  She was discharged on a higher dose of Topamax (100mg qAM and 150mg qHS).  This evening she presented to OSH with abnormal gait and episodes of "staring off" when she is not able to communicate.  Episodes last about ten minutes.  She had one event while in the ER prior to transfer.  On exam, patient is oriented to situation, place, and time.  When asked her full name, she is unable to answer; however she was able to answer when asked what her first name was and what her last name was separately.  She denies chest pain, complains of chronic SOB secondary to asthma.  Denies dizziness, palpitations, fever/chills, N/V/D, abdominal pain.        "

## 2018-10-27 NOTE — SUBJECTIVE & OBJECTIVE
Past Medical History:   Diagnosis Date    Asthma     GERD (gastroesophageal reflux disease)     HLD (hyperlipidemia)     Hypothyroidism due to Hashimoto's thyroiditis     Migraines     MAURO (obstructive sleep apnea)     Prediabetes        Past Surgical History:   Procedure Laterality Date    SHOULDER SURGERY Left        Review of patient's allergies indicates:   Allergen Reactions    Penicillins Anaphylaxis    Sulfa (sulfonamide antibiotics) Anaphylaxis    Clarithromycin        Current Neurological Medications: Topamax    No current facility-administered medications on file prior to encounter.      Current Outpatient Medications on File Prior to Encounter   Medication Sig    amitriptyline (ELAVIL) 100 MG tablet Take 100 mg by mouth every evening.    aspirin (ECOTRIN) 81 MG EC tablet Take 81 mg by mouth once daily.    atorvastatin (LIPITOR) 20 MG tablet Take 20 mg by mouth every evening.    levothyroxine (SYNTHROID) 50 MCG tablet Take 50 mcg by mouth before breakfast.    linaclotide (LINZESS) 290 mcg Cap Take 290 mcg by mouth every other day.    loratadine (CLARITIN) 10 mg tablet Take 10 mg by mouth once daily.    metFORMIN (GLUCOPHAGE) 500 MG tablet Take 500 mg by mouth 2 (two) times daily with meals.    montelukast (SINGULAIR) 10 mg tablet Take 10 mg by mouth every evening.    multivitamin capsule Take 1 capsule by mouth once daily.    omeprazole (PRILOSEC) 20 MG capsule Take 20 mg by mouth every evening.    omeprazole (PRILOSEC) 40 MG capsule Take 40 mg by mouth once daily.    topiramate (TOPAMAX) 100 MG tablet Take 100 mg by mouth once daily.    topiramate (TOPAMAX) 100 MG tablet Take 150 mg by mouth every evening.    vitamin D (VITAMIN D3) 1000 units Tab Take 1,000 Units by mouth once daily.     Family History     Problem Relation (Age of Onset)    Cancer Mother        Tobacco Use    Smoking status: Never Smoker   Substance and Sexual Activity    Alcohol use: No     Frequency: Never     "Drug use: Not on file    Sexual activity: Not on file     Review of Systems   Constitutional: Positive for activity change. Negative for chills, fatigue, fever and unexpected weight change.   HENT: Negative for mouth sores, sinus pain and sore throat.    Eyes: Negative for pain and visual disturbance.   Respiratory: Negative for cough, chest tightness and shortness of breath.    Cardiovascular: Negative for chest pain and palpitations.   Gastrointestinal: Negative for abdominal distention, abdominal pain, constipation, diarrhea, nausea and vomiting.   Genitourinary: Negative for difficulty urinating and dysuria.   Musculoskeletal: Positive for gait problem. Negative for back pain, myalgias, neck pain and neck stiffness.   Neurological: Positive for syncope, speech difficulty, weakness and numbness.        "staring spells", difficulty w/ memory   Psychiatric/Behavioral: Positive for confusion and decreased concentration. Negative for agitation.     Objective:     Vital Signs (Most Recent):  Temp: 97.9 °F (36.6 °C) (10/27/18 0422)  Pulse: 93 (10/27/18 1229)  Resp: 17 (10/27/18 1229)  BP: 119/83 (10/27/18 1229)  SpO2: 95 % (10/27/18 1229) Vital Signs (24h Range):  Temp:  [97.9 °F (36.6 °C)-98.7 °F (37.1 °C)] 97.9 °F (36.6 °C)  Pulse:  [86-97] 93  Resp:  [14-18] 17  SpO2:  [95 %-99 %] 95 %  BP: (106-119)/(64-83) 119/83     Weight: 67 kg (147 lb 11.3 oz)  Body mass index is 28.85 kg/m².    Physical Exam   Constitutional: She is oriented to person, place, and time. She appears well-developed and well-nourished. No distress.   HENT:   Head: Normocephalic and atraumatic.   Mouth/Throat: Oropharynx is clear and moist.   Eyes: Conjunctivae and EOM are normal. Pupils are equal, round, and reactive to light.   Neck: Normal range of motion. Neck supple.   Cardiovascular: Normal rate, regular rhythm and intact distal pulses.   Pulmonary/Chest: Effort normal and breath sounds normal.   Abdominal: Soft. Bowel sounds are normal. " She exhibits no distension. There is no tenderness.   Musculoskeletal:        Right shoulder: She exhibits decreased range of motion.        Left shoulder: She exhibits decreased range of motion.   Neurological: She is oriented to person, place, and time. She has an abnormal Finger-Nose-Finger Test.   Reflex Scores:       Bicep reflexes are 1+ on the right side and 1+ on the left side.       Brachioradialis reflexes are 1+ on the right side and 1+ on the left side.       Patellar reflexes are 1+ on the right side and 1+ on the left side.       Achilles reflexes are 1+ on the right side and 1+ on the left side.      NEUROLOGICAL EXAMINATION:     MENTAL STATUS   Oriented to person, place, and time.   Recall of objects at 5 minutes: 0/3 on word recall. Follows commands: yes but with delayed response.   Attention: normal. Concentration: decreased (could not spell world backwards).   Speech: (Slowed)  General knowledge: patient is normally high functioning, has a master's in special education.     CRANIAL NERVES     CN III, IV, VI   Pupils are equal, round, and reactive to light.  Extraocular motions are normal.     CN V   Right facial sensation deficit: none  Left facial sensation deficit: forehead, cheeks and mandible (less sensitive to light touch on left side)    CN VII   Facial expression full, symmetric.     CN IX, X   CN IX normal.   CN X normal.     CN XI   CN XI normal.   Right sternocleidomastoid strength: weak  Left sternocleidomastoid strength: weak    CN XII   CN XII normal.     MOTOR EXAM   Right arm tone: spastic  Left arm tone: spastic  Right arm pronator drift: absent  Left arm pronator drift: absent    Strength   Right deltoid: 4/5  Left deltoid: 4/5  Right biceps: 4/5  Left biceps: 4/5  Right triceps: 4/5  Left triceps: 4/5  Right wrist flexion: 4/5  Left wrist flexion: 4/5  Right wrist extension: 4/5  Left wrist extension: 4/5  Right interossei: 4/5  Left interossei: 4/5  Right quadriceps: 3/5  Left  quadriceps: 3/5  Right hamstring: 3/5  Left hamstring: 3/5  Right anterior tibial: 3/5  Left anterior tibial: 3/5  Right posterior tibial: 3/5  Left posterior tibial: 3/5  Right peroneal: 3/5  Left peroneal: 3/5  Right gastroc: 3/5  Left gastroc: 3/5       All of her movements seemed very slowed/delayed. When asked to phonate, she could appropriately repsond     REFLEXES     Reflexes   Right brachioradialis: 1+  Left brachioradialis: 1+  Right biceps: 1+  Left biceps: 1+  Right patellar: 1+  Left patellar: 1+  Right achilles: 1+  Left achilles: 1+  Right plantar: equivocal  Left plantar: equivocal    SENSORY EXAM   Light touch normal.     GAIT AND COORDINATION     Gait  Gait: spastic and wide-based     Coordination   Finger to nose coordination: abnormal    Tremor   Action tremor: right arm and left arm      Significant Labs:   CBC:   Recent Labs   Lab 10/27/18  0630   WBC 5.87   HGB 13.8   HCT 41.8        CMP:   Recent Labs   Lab 10/27/18  0630   GLU 90      K 3.8   *   CO2 19*   BUN 15   CREATININE 0.8   CALCIUM 9.5   MG 2.2   PROT 7.5   ALBUMIN 4.3   BILITOT 0.4   ALKPHOS 130   AST 13   ALT 21   ANIONGAP 8   EGFRNONAA >60.0     Recent Lab Results       10/27/18  0631   10/27/18  0630   10/27/18  0537        Immature Granulocytes   0.2       Immature Grans (Abs)   0.01  Comment:  Mild elevation in immature granulocytes is non specific and   can be seen in a variety of conditions including stress response,   acute inflammation, trauma and pregnancy. Correlation with other   laboratory and clinical findings is essential.         Albumin   4.3       Alkaline Phosphatase   130       ALT   21       Anion Gap   8       AST   13       Baso #   0.02       Basophil%   0.3       Total Bilirubin   0.4  Comment:  For infants and newborns, interpretation of results should be based  on gestational age, weight and in agreement with clinical  observations.  Premature Infant recommended reference ranges:  Up to  24 hours.............<8.0 mg/dL  Up to 48 hours............<12.0 mg/dL  3-5 days..................<15.0 mg/dL  6-29 days.................<15.0 mg/dL         BUN, Bld   15       Calcium   9.5       Chloride   112       CO2   19       Creatinine   0.8       Differential Method   Automated       eGFR if    >60.0       eGFR if non    >60.0  Comment:  Calculation used to obtain the estimated glomerular filtration  rate (eGFR) is the CKD-EPI equation.          Eos #   0.1       Eosinophil%   1.0       Estimated Avg Glucose 111         Folate   11.8       Glucose   90       Gran # (ANC)   3.3       Gran%   56.8       Hematocrit   41.8       Hemoglobin   13.8       Hemoglobin A1C 5.5  Comment:  ADA Screening Guidelines:  5.7-6.4%  Consistent with prediabetes  >or=6.5%  Consistent with diabetes  High levels of fetal hemoglobin interfere with the HbA1C  assay. Heterozygous hemoglobin variants (HbS, HgC, etc)do  not significantly interfere with this assay.   However, presence of multiple variants may affect accuracy.           Lymph #   2.0       Lymph%   34.2       Magnesium   2.2       MCH   29.8       MCHC   33.0       MCV   90       Mono #   0.4       Mono%   7.5       MPV   10.7       nRBC   0       Phosphorus   3.4       Platelets   179       POCT Glucose     79     Potassium   3.8       Total Protein   7.5       RBC   4.63       RDW   12.9       RPR   Non-reactive       Sodium   139       TSH   2.109       Vit D, 25-Hydroxy   35  Comment:  Vitamin D deficiency.........<10 ng/mL                              Vitamin D insufficiency......10-29 ng/mL       Vitamin D sufficiency........> or equal to 30 ng/mL  Vitamin D toxicity............>100 ng/mL         Vitamin B-12   438       WBC   5.87             Significant Imaging: I have reviewed all pertinent imaging results/findings within the past 24 hours.

## 2018-10-27 NOTE — SUBJECTIVE & OBJECTIVE
Past Medical History:   Diagnosis Date    Asthma     GERD (gastroesophageal reflux disease)     HLD (hyperlipidemia)     Hypothyroidism due to Hashimoto's thyroiditis     Migraines     MAURO (obstructive sleep apnea)     Prediabetes        Past Surgical History:   Procedure Laterality Date    SHOULDER SURGERY Left        Review of patient's allergies indicates:   Allergen Reactions    Penicillins Anaphylaxis    Sulfa (sulfonamide antibiotics) Anaphylaxis    Clarithromycin        No current facility-administered medications on file prior to encounter.      Current Outpatient Medications on File Prior to Encounter   Medication Sig    amitriptyline (ELAVIL) 100 MG tablet Take 100 mg by mouth every evening.    aspirin (ECOTRIN) 81 MG EC tablet Take 81 mg by mouth once daily.    atorvastatin (LIPITOR) 20 MG tablet Take 20 mg by mouth every evening.    levothyroxine (SYNTHROID) 50 MCG tablet Take 50 mcg by mouth before breakfast.    linaclotide (LINZESS) 290 mcg Cap Take 290 mcg by mouth every other day.    loratadine (CLARITIN) 10 mg tablet Take 10 mg by mouth once daily.    metFORMIN (GLUCOPHAGE) 500 MG tablet Take 500 mg by mouth 2 (two) times daily with meals.    montelukast (SINGULAIR) 10 mg tablet Take 10 mg by mouth every evening.    multivitamin capsule Take 1 capsule by mouth once daily.    omeprazole (PRILOSEC) 20 MG capsule Take 20 mg by mouth every evening.    omeprazole (PRILOSEC) 40 MG capsule Take 40 mg by mouth once daily.    topiramate (TOPAMAX) 100 MG tablet Take 100 mg by mouth once daily.    topiramate (TOPAMAX) 100 MG tablet Take 150 mg by mouth every evening.    vitamin D (VITAMIN D3) 1000 units Tab Take 1,000 Units by mouth once daily.     Family History     Problem Relation (Age of Onset)    Cancer Mother        Tobacco Use    Smoking status: Never Smoker   Substance and Sexual Activity    Alcohol use: No     Frequency: Never    Drug use: Not on file    Sexual activity:  "Not on file     Review of Systems   Constitutional: Positive for activity change. Negative for appetite change, chills, fatigue, fever and unexpected weight change.   HENT: Negative for congestion, rhinorrhea, sore throat, trouble swallowing and voice change.    Eyes: Negative for visual disturbance.   Respiratory: Positive for shortness of breath (Chronic and unchanged). Negative for cough, choking, chest tightness and wheezing.    Cardiovascular: Negative for chest pain, palpitations and leg swelling.   Gastrointestinal: Negative for abdominal distention, abdominal pain, anal bleeding, blood in stool, constipation, diarrhea, nausea and vomiting.   Endocrine: Negative for cold intolerance, heat intolerance, polydipsia and polyuria.   Genitourinary: Negative for dysuria, flank pain, frequency, hematuria and urgency.   Musculoskeletal: Positive for gait problem. Negative for arthralgias, back pain, joint swelling and myalgias.   Skin: Negative for color change and rash.   Neurological: Positive for speech difficulty and weakness. Negative for dizziness, seizures, syncope, facial asymmetry, light-headedness, numbness and headaches.        "Staring spells"   Hematological: Negative for adenopathy. Does not bruise/bleed easily.   Psychiatric/Behavioral: Positive for confusion. Negative for agitation, hallucinations and suicidal ideas.     Objective:     Vital Signs (Most Recent):  Temp: 97.9 °F (36.6 °C) (10/27/18 0422)  Pulse: 86 (10/27/18 0422)  Resp: 14 (10/27/18 0422)  BP: 107/75 (10/27/18 0422)  SpO2: 99 % (10/27/18 0422) Vital Signs (24h Range):  Temp:  [97.9 °F (36.6 °C)-98.7 °F (37.1 °C)] 97.9 °F (36.6 °C)  Pulse:  [86-97] 86  Resp:  [14-18] 14  SpO2:  [95 %-99 %] 99 %  BP: (106-108)/(64-76) 107/75     Weight: 67 kg (147 lb 11.3 oz)  Body mass index is 28.85 kg/m².    Physical Exam   Constitutional: She appears well-developed and well-nourished. No distress.   HENT:   Head: Normocephalic and atraumatic.   Eyes: " Pupils are equal, round, and reactive to light.   Neck: Neck supple. Carotid bruit is not present. No thyromegaly present.   Cardiovascular: Normal rate and regular rhythm. Exam reveals no gallop.   No murmur heard.  Pulmonary/Chest: Effort normal and breath sounds normal. No respiratory distress. She has no wheezes.   Abdominal: Soft. Bowel sounds are normal. She exhibits no distension and no mass. There is no splenomegaly or hepatomegaly. There is no tenderness.   Musculoskeletal: Normal range of motion. She exhibits no edema or deformity.   Neurological: She is alert. No cranial nerve deficit or sensory deficit. GCS eye subscore is 4. GCS verbal subscore is 5. GCS motor subscore is 6.   Oriented to place, time, and situation.  Unable to answer when asked full name.  Able to correctly answer when asked separately for her first and last name.  Speech slowed, negative pronator drift   Skin: Skin is warm and dry. No rash noted.   Psychiatric: She has a normal mood and affect. Her speech is delayed.         CRANIAL NERVES     CN III, IV, VI   Pupils are equal, round, and reactive to light.       Significant Labs: None    Significant Imaging: I have reviewed all pertinent imaging results/findings within the past 24 hours.

## 2018-10-27 NOTE — ASSESSMENT & PLAN NOTE
- Patient has not been taking her Metformin as she is waiting on a new prescription to come in the mail.  - HgbA1c.  - Diabetic diet (once BONG passed) and SSI.

## 2018-10-27 NOTE — LETTER
November 2, 2018               [facility]  Hospital Medicine  [address] [place] [address]  [address]  Phone: [phone]  Fax: [phone] November 2, 2018     Patient: Vivian Peterson   YOB: 1970       To Whom It May Concern:    Vivian Peterson was admitted to the hospital on 10/27/2018  4:11 AM and currently remains hospitalized. .  She may return to work on November 12, 2018.  If you have any questions or concerns, please don't hesitate to call the Department of Hospital medicine at [phone].      Sincerely,        Rubi Aguila M.D., M.P.H.  Department of Hospital Medicine  [facility] [address] [place] [address]

## 2018-10-27 NOTE — NURSING
Upon entering patients room to take blood sugar patient had already eaten snacks from home, this reading would not have been accurate therefore this nurse did not take patients blood sugar. Will continue to monitor.

## 2018-10-27 NOTE — PLAN OF CARE
Problem: Patient Care Overview  Goal: Plan of Care Review  Outcome: Ongoing (interventions implemented as appropriate)  SR up x2, call bell in reach, bed in low locked position, skid proof socks on. Patient AAOx4. VS stable WNL. Patient remained free of fall and injuries during the shift.  Care plan explained to patient. No concerns, will continue to monitor.

## 2018-10-28 LAB
ALBUMIN SERPL BCP-MCNC: 4 G/DL
ALP SERPL-CCNC: 136 U/L
ALT SERPL W/O P-5'-P-CCNC: 22 U/L
ANION GAP SERPL CALC-SCNC: 6 MMOL/L
AST SERPL-CCNC: 13 U/L
BASOPHILS # BLD AUTO: 0.03 K/UL
BASOPHILS NFR BLD: 0.6 %
BILIRUB SERPL-MCNC: 0.4 MG/DL
BUN SERPL-MCNC: 19 MG/DL
CALCIUM SERPL-MCNC: 9.2 MG/DL
CHLORIDE SERPL-SCNC: 111 MMOL/L
CO2 SERPL-SCNC: 23 MMOL/L
CREAT SERPL-MCNC: 1 MG/DL
DIFFERENTIAL METHOD: NORMAL
EOSINOPHIL # BLD AUTO: 0 K/UL
EOSINOPHIL NFR BLD: 0.8 %
ERYTHROCYTE [DISTWIDTH] IN BLOOD BY AUTOMATED COUNT: 13 %
EST. GFR  (AFRICAN AMERICAN): >60 ML/MIN/1.73 M^2
EST. GFR  (NON AFRICAN AMERICAN): >60 ML/MIN/1.73 M^2
GLUCOSE SERPL-MCNC: 90 MG/DL
HCT VFR BLD AUTO: 41.4 %
HGB BLD-MCNC: 13.5 G/DL
IMM GRANULOCYTES # BLD AUTO: 0.01 K/UL
IMM GRANULOCYTES NFR BLD AUTO: 0.2 %
LYMPHOCYTES # BLD AUTO: 1.6 K/UL
LYMPHOCYTES NFR BLD: 30.3 %
MCH RBC QN AUTO: 29.8 PG
MCHC RBC AUTO-ENTMCNC: 32.6 G/DL
MCV RBC AUTO: 91 FL
MONOCYTES # BLD AUTO: 0.5 K/UL
MONOCYTES NFR BLD: 8.5 %
NEUTROPHILS # BLD AUTO: 3.2 K/UL
NEUTROPHILS NFR BLD: 59.6 %
NRBC BLD-RTO: 0 /100 WBC
PLATELET # BLD AUTO: 193 K/UL
PMV BLD AUTO: 11 FL
POCT GLUCOSE: 104 MG/DL (ref 70–110)
POCT GLUCOSE: 118 MG/DL (ref 70–110)
POCT GLUCOSE: 87 MG/DL (ref 70–110)
POTASSIUM SERPL-SCNC: 3.7 MMOL/L
PROT SERPL-MCNC: 7 G/DL
RBC # BLD AUTO: 4.53 M/UL
SODIUM SERPL-SCNC: 140 MMOL/L
T4 FREE SERPL-MCNC: 0.98 NG/DL
WBC # BLD AUTO: 5.28 K/UL

## 2018-10-28 PROCEDURE — 99900035 HC TECH TIME PER 15 MIN (STAT)

## 2018-10-28 PROCEDURE — 95951 PR EEG MONITORING/VIDEORECORD: CPT | Mod: 26,,, | Performed by: PSYCHIATRY & NEUROLOGY

## 2018-10-28 PROCEDURE — 85025 COMPLETE CBC W/AUTO DIFF WBC: CPT

## 2018-10-28 PROCEDURE — 95951 HC EEG MONITORING/VIDEO RECORD: CPT

## 2018-10-28 PROCEDURE — 27000221 HC OXYGEN, UP TO 24 HOURS

## 2018-10-28 PROCEDURE — 25000003 PHARM REV CODE 250: Performed by: PHYSICIAN ASSISTANT

## 2018-10-28 PROCEDURE — A4216 STERILE WATER/SALINE, 10 ML: HCPCS | Performed by: PHYSICIAN ASSISTANT

## 2018-10-28 PROCEDURE — 99232 SBSQ HOSP IP/OBS MODERATE 35: CPT | Mod: ,,, | Performed by: INTERNAL MEDICINE

## 2018-10-28 PROCEDURE — 94761 N-INVAS EAR/PLS OXIMETRY MLT: CPT

## 2018-10-28 PROCEDURE — 27000190 HC CPAP FULL FACE MASK W/VALVE

## 2018-10-28 PROCEDURE — 80053 COMPREHEN METABOLIC PANEL: CPT

## 2018-10-28 PROCEDURE — 94660 CPAP INITIATION&MGMT: CPT

## 2018-10-28 PROCEDURE — 11000001 HC ACUTE MED/SURG PRIVATE ROOM

## 2018-10-28 PROCEDURE — 63600175 PHARM REV CODE 636 W HCPCS: Performed by: INTERNAL MEDICINE

## 2018-10-28 PROCEDURE — 36415 COLL VENOUS BLD VENIPUNCTURE: CPT

## 2018-10-28 PROCEDURE — 99233 SBSQ HOSP IP/OBS HIGH 50: CPT | Mod: ,,, | Performed by: PSYCHIATRY & NEUROLOGY

## 2018-10-28 RX ORDER — ENOXAPARIN SODIUM 100 MG/ML
40 INJECTION SUBCUTANEOUS EVERY 24 HOURS
Status: DISCONTINUED | OUTPATIENT
Start: 2018-10-28 | End: 2018-11-05 | Stop reason: HOSPADM

## 2018-10-28 RX ADMIN — ACETAMINOPHEN 650 MG: 325 TABLET, FILM COATED ORAL at 09:10

## 2018-10-28 RX ADMIN — DOCUSATE SODIUM 100 MG: 100 CAPSULE, LIQUID FILLED ORAL at 08:10

## 2018-10-28 RX ADMIN — PANTOPRAZOLE SODIUM 40 MG: 40 TABLET, DELAYED RELEASE ORAL at 08:10

## 2018-10-28 RX ADMIN — Medication 3 ML: at 05:10

## 2018-10-28 RX ADMIN — AMITRIPTYLINE HYDROCHLORIDE 100 MG: 25 TABLET, FILM COATED ORAL at 09:10

## 2018-10-28 RX ADMIN — Medication 3 ML: at 09:10

## 2018-10-28 RX ADMIN — ATORVASTATIN CALCIUM 20 MG: 20 TABLET, FILM COATED ORAL at 09:10

## 2018-10-28 RX ADMIN — THERA TABS 1 TABLET: TAB at 08:10

## 2018-10-28 RX ADMIN — VITAMIN D, TAB 1000IU (100/BT) 1000 UNITS: 25 TAB at 08:10

## 2018-10-28 RX ADMIN — ENOXAPARIN SODIUM 40 MG: 100 INJECTION SUBCUTANEOUS at 04:10

## 2018-10-28 RX ADMIN — PANTOPRAZOLE SODIUM 40 MG: 40 TABLET, DELAYED RELEASE ORAL at 09:10

## 2018-10-28 RX ADMIN — CETIRIZINE HYDROCHLORIDE 10 MG: 5 TABLET ORAL at 08:10

## 2018-10-28 RX ADMIN — ASPIRIN 81 MG: 81 TABLET, COATED ORAL at 08:10

## 2018-10-28 RX ADMIN — DOCUSATE SODIUM 100 MG: 100 CAPSULE, LIQUID FILLED ORAL at 09:10

## 2018-10-28 RX ADMIN — TOPIRAMATE 150 MG: 100 TABLET, FILM COATED ORAL at 09:10

## 2018-10-28 RX ADMIN — ACETAMINOPHEN 650 MG: 325 TABLET, FILM COATED ORAL at 04:10

## 2018-10-28 RX ADMIN — MONTELUKAST SODIUM 10 MG: 10 TABLET, FILM COATED ORAL at 09:10

## 2018-10-28 RX ADMIN — TOPIRAMATE 100 MG: 100 TABLET, FILM COATED ORAL at 08:10

## 2018-10-28 RX ADMIN — Medication 3 ML: at 04:10

## 2018-10-28 RX ADMIN — LEVOTHYROXINE SODIUM 50 MCG: 50 TABLET ORAL at 05:10

## 2018-10-28 NOTE — NURSING
"Patient contacted nurse about a "pain" in the right leg behind the knee and asked if it was okay to reapply the SCDs. When this nurse assessed the patient the pain was already gone, there was no noticeable temperature change within the concerned area and the rest of the right leg. Pedal pulses equal. Spoke with Dr. Dillon regarding this event and she stated it was okay to reapply SCDs and she will order and ultrasound.   "

## 2018-10-28 NOTE — PROGRESS NOTES
Physician Attestation for Scribe:  I, Elaina Dillon MD, personally performed the services described in this documentation. All medical record entries made by the scribe were at my direction and in my presence.  I have reviewed the chart and agree that the record reflects my personal performance and is accurate and complete.   Elaina Dillon MD    Hospital Medicine  Progress note    Patient Name: Vivian Peterson  MRN: [id number]  Team: [facility] Elaina Dillon MD  Admit Date: 10/27/2018  Code status: Full Code    Principal Problem:  Transient alteration of awareness    Interval hx: Patient with no new complaints.    ROS   Respiratory: no cough or shortness of breath  Cardiovascular: no chest pain or palpitations  Gastrointestinal: no nausea or vomiting, no abdominal pain or change in bowel habits  Behavioral/Psych: no depression or anxiety      PEx  Temp:  [97.9 °F (36.6 °C)-98.7 °F (37.1 °C)]   Pulse:  [85-94]   Resp:  [13-18]   BP: (106-119)/(64-83)   SpO2:  [95 %-99 %]     Intake/Output Summary (Last 24 hours) at 10/27/2018 2217  Last data filed at 10/27/2018 1500  Gross per 24 hour   Intake 625 ml   Output 575 ml   Net 50 ml       General Appearance: no acute distress   Heart: regular rate and rhythm  Respiratory: Normal respiratory effort, no crackles   Abdomen: Soft, non-tender; bowel sounds active  Skin: intact.   Neurologic:  No focal numbness or weakness  Mental status: Alert. Delayed responses.     Recent Labs   Lab 10/27/18  0630   WBC 5.87   HGB 13.8   HCT 41.8        Recent Labs   Lab 10/27/18  0630      K 3.8   *   CO2 19*   BUN 15   CREATININE 0.8   GLU 90   CALCIUM 9.5   MG 2.2   PHOS 3.4     Recent Labs   Lab 10/27/18  0630   ALKPHOS 130   ALT 21   AST 13   ALBUMIN 4.3   PROT 7.5   BILITOT 0.4      Recent Labs   Lab 10/27/18  0537 10/27/18  1848 10/27/18  2035   POCTGLUCOSE 79 73 138*       Scheduled Meds:   amitriptyline  100 mg Oral QHS    aspirin  81 mg Oral Daily     "atorvastatin  20 mg Oral QHS    cetirizine  10 mg Oral Daily    docusate sodium  100 mg Oral BID    levothyroxine  50 mcg Oral Before breakfast    montelukast  10 mg Oral QHS    multivitamin  1 tablet Oral Daily    pantoprazole  40 mg Oral QHS    pantoprazole  40 mg Oral Daily    sodium chloride 0.9%  3 mL Intravenous Q8H    topiramate  100 mg Oral Daily    topiramate  150 mg Oral QHS    vitamin D  1,000 Units Oral Daily     Continuous Infusions:  As Needed:  acetaminophen, albuterol-ipratropium, dextrose 50%, dextrose 50%, glucagon (human recombinant), glucose, glucose, insulin aspart U-100, loperamide, ondansetron, polyethylene glycol, promethazine    Active Hospital Problems    Diagnosis  POA    *Transient alteration of awareness [R40.4]  Yes    Prediabetes [R73.03]  Yes     Chronic    Hypothyroidism due to Hashimoto's thyroiditis [E03.8, E06.3]  Yes     Chronic    HLD (hyperlipidemia) [E78.5]  Yes     Chronic    GERD (gastroesophageal reflux disease) [K21.9]  Yes     Chronic    Asthma [J45.909]  Yes     Chronic    Migraines [G43.909]  Yes     Chronic    MAURO (obstructive sleep apnea) [G47.33]  Yes      Resolved Hospital Problems   No resolved problems to display.       Overview  48 year old female with a h/o migraines, prediabetes on Metformin, GERD, asthma, HLD, hypothyroidism secondary to Hashimoto's, and MAURO who transferred from [facility] [place] where she presented with L sided weakness, numbness, and "staring spells." Of note, patient's levothyroxine was discontinued in February and admitted for thyroid storm, after which levothyroxine was resumed with persisting mental status changes. Previous work-up at OSH reportedly included unremarkable MRI brain, CTH, EEG and LP.      Assessment and Plan for Problems addressed today:    Transient alteration of awareness  · Unclear etiology at this time.  · Work-ups during previous hospitalizations, reportedly included EEG, CT head, MRI " brain, and LP, all unremarkable. UA, urine tox screen at OSH negative.  · Fall precautions, seizure precautions, PT/OT ordered.  · Topiramate levels ordered.    · Vit B12/Folate & Vit D WNL. RPR, B1, B3, B6 pending.    · Consulted neurology, ordered EEG. Requesting MRI of head and Cand T spine - will need to clarify with or without contrast    Migraines  · Continued home amitriptyline 100mg qHS and topiramate 100mg daily and 150mg qHS.    Hypothyroidism due to Hashimoto's thyroiditis  · Continued home levothyroxine 50mcg.  · TSH, T4 wnl.     Prediabetes  · Patient has not been taking her Metformin as she is waiting on a new prescription to come in the mail.  · HA1c: 5.5%   · Low dose insulin correction ordered.     Asthma  MAURO (obstructive sleep apnea)  · CPAP qHS.  · Continued home Singulair & montelukast.     Hyperlipidemia  GERD (gastroesophageal reflux disease)  · Continued home Lipitor 20mg qHS & pantoprazole 40mg daily.       Diet: Diet diabetic [facility]; 1500 Calorie  DVT Prophylaxis: TEDs/SCDs  Anticoagulants   Medication Route Frequency       L/D/A:  PIV    Discharge plan and follow up  Home or Self Care      Provider  Elaina Dillon MD  [facility] MED D   Department of Hospital Medicine    Scribe Attestation: I personally scribed for Elaina Dillon MD on 10/27/2018 at 9:24 AM. Electronically signed by radha Macario on 10/27/2018 at 9:24 AM.

## 2018-10-28 NOTE — MEDICAL/APP STUDENT
Hospital Medicine  Progress note    Patient Name: Vivian Peterson  MRN: [id number]  Team: [facility] MED D Elaina Dillon MD  Admit Date: 10/27/2018  Code status: Full Code    Principal Problem:  Transient alteration of awareness    Interval hx: Patient with no new complaints.    ROS   Respiratory: no cough or shortness of breath  Cardiovascular: no chest pain or palpitations  Gastrointestinal: no nausea or vomiting, no abdominal pain or change in bowel habits  Behavioral/Psych: no depression or anxiety    PEx  Temp:  [96.5 °F (35.8 °C)-97.5 °F (36.4 °C)]   Pulse:  [74-93]   Resp:  [13-22]   BP: ()/(62-83)   SpO2:  [94 %-99 %]     Intake/Output Summary (Last 24 hours) at 10/28/2018 0855  Last data filed at 10/28/2018 0600  Gross per 24 hour   Intake 680 ml   Output 735 ml   Net -55 ml       General Appearance: no acute distress   Heart: regular rate and rhythm  Respiratory: Normal respiratory effort, no crackles   Abdomen: Soft, non-tender; bowel sounds active  Skin: intact.   Neurologic:  No focal numbness or weakness  Mental status: Alert. Delayed responses.     Recent Labs   Lab 10/27/18  0630 10/28/18  0639   WBC 5.87 5.28   HGB 13.8 13.5   HCT 41.8 41.4    193     Recent Labs   Lab 10/27/18  0630 10/28/18  0639    140   K 3.8 3.7   * 111*   CO2 19* 23   BUN 15 19   CREATININE 0.8 1.0   GLU 90 90   CALCIUM 9.5 9.2   MG 2.2  --    PHOS 3.4  --      Recent Labs   Lab 10/27/18  0630 10/28/18  0639   ALKPHOS 130 136*   ALT 21 22   AST 13 13   ALBUMIN 4.3 4.0   PROT 7.5 7.0   BILITOT 0.4 0.4      Recent Labs   Lab 10/27/18  0537 10/27/18  1848 10/27/18  2035 10/28/18  0748   POCTGLUCOSE 79 73 138* 87       Scheduled Meds:   amitriptyline  100 mg Oral QHS    aspirin  81 mg Oral Daily    atorvastatin  20 mg Oral QHS    cetirizine  10 mg Oral Daily    docusate sodium  100 mg Oral BID    levothyroxine  50 mcg Oral Before breakfast    montelukast  10 mg Oral QHS    multivitamin  1 tablet Oral  "Daily    pantoprazole  40 mg Oral QHS    pantoprazole  40 mg Oral Daily    sodium chloride 0.9%  3 mL Intravenous Q8H    topiramate  100 mg Oral Daily    topiramate  150 mg Oral QHS    vitamin D  1,000 Units Oral Daily     Continuous Infusions:  As Needed:  acetaminophen, albuterol-ipratropium, dextrose 50%, dextrose 50%, glucagon (human recombinant), glucose, glucose, insulin aspart U-100, loperamide, ondansetron, polyethylene glycol, promethazine    Active Hospital Problems    Diagnosis  POA    *Transient alteration of awareness [R40.4]  Yes    Prediabetes [R73.03]  Yes     Chronic    Hypothyroidism due to Hashimoto's thyroiditis [E03.8, E06.3]  Yes     Chronic    HLD (hyperlipidemia) [E78.5]  Yes     Chronic    GERD (gastroesophageal reflux disease) [K21.9]  Yes     Chronic    Asthma [J45.909]  Yes     Chronic    Migraines [G43.909]  Yes     Chronic    MAURO (obstructive sleep apnea) [G47.33]  Yes      Resolved Hospital Problems   No resolved problems to display.       Overview  48 year old female with a h/o migraines, prediabetes on Metformin, GERD, asthma, HLD, hypothyroidism secondary to Hashimoto's, and MAURO who transferred from [facility] where she presented with L sided weakness, numbness, and "staring spells." Of note, patient's levothyroxine was discontinued in February and admitted for thyroid storm, after which levothyroxine was resumed with persisting mental status changes. Previous work-up at OSH reportedly included unremarkable MRI brain, CTH, EEG and LP.      Assessment and Plan for Problems addressed today:    Transient alteration of awareness  · Unclear etiology at this time.  · Work-ups during previous hospitalizations, reportedly included EEG, CT head, MRI brain, and LP, all unremarkable. UA, urine tox screen at OSH negative.  · Fall precautions, seizure precautions, PT/OT ordered.  · Topiramate levels ordered.    · Vit B12/Folate & Vit D WNL. RPR, B1, B3, B6 " "pending.    · Consulted neurology: ordered EEG pending  · Brain/Cervical/Thoracic spine MRI (10/27): "Right frontal lobe developmental venous anomaly." Anomaly does not explain symptoms. Cervical/thoracic spine unremarkable.      Migraines  · Continued home amitriptyline 100mg qHS and topiramate 100mg daily and 150mg qHS.    Hypothyroidism due to Hashimoto's thyroiditis  · Continued home levothyroxine 50mcg.  · TSH, T4 wnl.     Prediabetes  · Patient has not been taking her Metformin as she is waiting on a new prescription to come in the mail.  · HA1c: 5.5%   · Low dose insulin correction ordered.     Asthma  MAURO (obstructive sleep apnea)  · CPAP qHS.  · Continued home Singulair & montelukast.     Hyperlipidemia  GERD (gastroesophageal reflux disease)  · Continued home Lipitor 20mg qHS & pantoprazole 40mg daily.     Right popliteal pain  Ultrasound for DVT      Diet: Diet diabetic [facility]; 1500 Calorie  DVT Prophylaxis: TEDs/SCDs  Anticoagulants   Medication Route Frequency    enoxaparin injection 40 mg Subcutaneous Daily       L/D/A:  PIV    Discharge plan and follow up  Home or Self Care      Provider  Elaina Dillon MD  [facility] MED D   Department of Hospital Medicine    Scribe Attestation: I personally scribed for Elaina Dillon MD on 10/28/2018 at 9:24 AM. Electronically signed by radha Macario on 10/28/2018 at 9:24 AM.    "

## 2018-10-28 NOTE — PLAN OF CARE
Problem: Patient Care Overview  Goal: Plan of Care Review  Outcome: Ongoing (interventions implemented as appropriate)  Asleep most of the night with CPAP on. Tolerated well. Complain of headache x 1. Tylenol 650 mg po given.  Relief noted.  Fall and injury free. Bed in low position and locked. Side rails up x 2. Call bell in reach. Telemetry

## 2018-10-29 LAB
ALBUMIN SERPL BCP-MCNC: 3.9 G/DL
ALLENS TEST: ABNORMAL
ALP SERPL-CCNC: 127 U/L
ALT SERPL W/O P-5'-P-CCNC: 20 U/L
ANION GAP SERPL CALC-SCNC: 8 MMOL/L
AST SERPL-CCNC: 13 U/L
BASOPHILS # BLD AUTO: 0.03 K/UL
BASOPHILS NFR BLD: 0.7 %
BILIRUB SERPL-MCNC: 0.3 MG/DL
BUN SERPL-MCNC: 17 MG/DL
CALCIUM SERPL-MCNC: 8.9 MG/DL
CHLORIDE SERPL-SCNC: 111 MMOL/L
CO2 SERPL-SCNC: 21 MMOL/L
CREAT SERPL-MCNC: 0.9 MG/DL
DELSYS: ABNORMAL
DIFFERENTIAL METHOD: NORMAL
EOSINOPHIL # BLD AUTO: 0 K/UL
EOSINOPHIL NFR BLD: 0.7 %
ERYTHROCYTE [DISTWIDTH] IN BLOOD BY AUTOMATED COUNT: 12.8 %
EST. GFR  (AFRICAN AMERICAN): >60 ML/MIN/1.73 M^2
EST. GFR  (NON AFRICAN AMERICAN): >60 ML/MIN/1.73 M^2
GLUCOSE SERPL-MCNC: 87 MG/DL
HCO3 UR-SCNC: 21.7 MMOL/L (ref 24–28)
HCT VFR BLD AUTO: 41.6 %
HGB BLD-MCNC: 13.7 G/DL
IMM GRANULOCYTES # BLD AUTO: 0.01 K/UL
IMM GRANULOCYTES NFR BLD AUTO: 0.2 %
LYMPHOCYTES # BLD AUTO: 1.9 K/UL
LYMPHOCYTES NFR BLD: 44.2 %
MCH RBC QN AUTO: 30.4 PG
MCHC RBC AUTO-ENTMCNC: 32.9 G/DL
MCV RBC AUTO: 92 FL
MONOCYTES # BLD AUTO: 0.3 K/UL
MONOCYTES NFR BLD: 7.3 %
NEUTROPHILS # BLD AUTO: 2 K/UL
NEUTROPHILS NFR BLD: 46.9 %
NRBC BLD-RTO: 0 /100 WBC
PCO2 BLDA: 36.6 MMHG (ref 35–45)
PH SMN: 7.38 [PH] (ref 7.35–7.45)
PLATELET # BLD AUTO: 172 K/UL
PMV BLD AUTO: 11.2 FL
PO2 BLDA: 59 MMHG (ref 80–100)
POC BE: -3 MMOL/L
POC SATURATED O2: 90 % (ref 95–100)
POC TCO2: 23 MMOL/L (ref 23–27)
POCT GLUCOSE: 108 MG/DL (ref 70–110)
POCT GLUCOSE: 81 MG/DL (ref 70–110)
POCT GLUCOSE: 84 MG/DL (ref 70–110)
POCT GLUCOSE: 85 MG/DL (ref 70–110)
POTASSIUM SERPL-SCNC: 3.7 MMOL/L
PROT SERPL-MCNC: 6.9 G/DL
RBC # BLD AUTO: 4.5 M/UL
SAMPLE: ABNORMAL
SITE: ABNORMAL
SODIUM SERPL-SCNC: 140 MMOL/L
VIT B1 SERPL-MCNC: 59 UG/L (ref 38–122)
WBC # BLD AUTO: 4.25 K/UL

## 2018-10-29 PROCEDURE — 20000000 HC ICU ROOM

## 2018-10-29 PROCEDURE — 99291 CRITICAL CARE FIRST HOUR: CPT | Mod: ,,, | Performed by: ANESTHESIOLOGY

## 2018-10-29 PROCEDURE — 85025 COMPLETE CBC W/AUTO DIFF WBC: CPT

## 2018-10-29 PROCEDURE — 63600175 PHARM REV CODE 636 W HCPCS: Performed by: INTERNAL MEDICINE

## 2018-10-29 PROCEDURE — 95951 HC EEG MONITORING/VIDEO RECORD: CPT

## 2018-10-29 PROCEDURE — 25000003 PHARM REV CODE 250: Performed by: PHYSICIAN ASSISTANT

## 2018-10-29 PROCEDURE — 97165 OT EVAL LOW COMPLEX 30 MIN: CPT

## 2018-10-29 PROCEDURE — 27000221 HC OXYGEN, UP TO 24 HOURS

## 2018-10-29 PROCEDURE — 36600 WITHDRAWAL OF ARTERIAL BLOOD: CPT

## 2018-10-29 PROCEDURE — 36415 COLL VENOUS BLD VENIPUNCTURE: CPT

## 2018-10-29 PROCEDURE — 99900035 HC TECH TIME PER 15 MIN (STAT)

## 2018-10-29 PROCEDURE — 95951 PR EEG MONITORING/VIDEORECORD: CPT | Mod: 26,,, | Performed by: PSYCHIATRY & NEUROLOGY

## 2018-10-29 PROCEDURE — 97162 PT EVAL MOD COMPLEX 30 MIN: CPT

## 2018-10-29 PROCEDURE — 97530 THERAPEUTIC ACTIVITIES: CPT

## 2018-10-29 PROCEDURE — A4216 STERILE WATER/SALINE, 10 ML: HCPCS | Performed by: PHYSICIAN ASSISTANT

## 2018-10-29 PROCEDURE — 94761 N-INVAS EAR/PLS OXIMETRY MLT: CPT

## 2018-10-29 PROCEDURE — 99233 SBSQ HOSP IP/OBS HIGH 50: CPT | Mod: ,,, | Performed by: PSYCHIATRY & NEUROLOGY

## 2018-10-29 PROCEDURE — 80053 COMPREHEN METABOLIC PANEL: CPT

## 2018-10-29 PROCEDURE — 25000003 PHARM REV CODE 250: Performed by: INTERNAL MEDICINE

## 2018-10-29 PROCEDURE — 82803 BLOOD GASES ANY COMBINATION: CPT

## 2018-10-29 RX ORDER — SODIUM,POTASSIUM PHOSPHATES 280-250MG
2 POWDER IN PACKET (EA) ORAL
Status: DISCONTINUED | OUTPATIENT
Start: 2018-10-29 | End: 2018-10-31

## 2018-10-29 RX ORDER — LANOLIN ALCOHOL/MO/W.PET/CERES
800 CREAM (GRAM) TOPICAL
Status: DISCONTINUED | OUTPATIENT
Start: 2018-10-29 | End: 2018-10-31

## 2018-10-29 RX ORDER — POTASSIUM CHLORIDE 20 MEQ/15ML
40 SOLUTION ORAL
Status: DISCONTINUED | OUTPATIENT
Start: 2018-10-29 | End: 2018-10-31

## 2018-10-29 RX ORDER — POTASSIUM CHLORIDE 20 MEQ/15ML
60 SOLUTION ORAL
Status: DISCONTINUED | OUTPATIENT
Start: 2018-10-29 | End: 2018-10-31

## 2018-10-29 RX ORDER — SODIUM CHLORIDE 9 MG/ML
INJECTION, SOLUTION INTRAVENOUS CONTINUOUS
Status: DISCONTINUED | OUTPATIENT
Start: 2018-10-29 | End: 2018-10-30

## 2018-10-29 RX ADMIN — DOCUSATE SODIUM 100 MG: 100 CAPSULE, LIQUID FILLED ORAL at 10:10

## 2018-10-29 RX ADMIN — MONTELUKAST SODIUM 10 MG: 10 TABLET, FILM COATED ORAL at 08:10

## 2018-10-29 RX ADMIN — Medication 3 ML: at 06:10

## 2018-10-29 RX ADMIN — AMITRIPTYLINE HYDROCHLORIDE 100 MG: 25 TABLET, FILM COATED ORAL at 08:10

## 2018-10-29 RX ADMIN — THERA TABS 1 TABLET: TAB at 10:10

## 2018-10-29 RX ADMIN — CETIRIZINE HYDROCHLORIDE 10 MG: 5 TABLET ORAL at 10:10

## 2018-10-29 RX ADMIN — LEVOTHYROXINE SODIUM 50 MCG: 50 TABLET ORAL at 06:10

## 2018-10-29 RX ADMIN — SODIUM CHLORIDE: 0.9 INJECTION, SOLUTION INTRAVENOUS at 02:10

## 2018-10-29 RX ADMIN — VITAMIN D, TAB 1000IU (100/BT) 1000 UNITS: 25 TAB at 10:10

## 2018-10-29 RX ADMIN — ASPIRIN 81 MG: 81 TABLET, COATED ORAL at 10:10

## 2018-10-29 RX ADMIN — PANTOPRAZOLE SODIUM 40 MG: 40 TABLET, DELAYED RELEASE ORAL at 10:10

## 2018-10-29 RX ADMIN — TOPIRAMATE 150 MG: 100 TABLET, FILM COATED ORAL at 08:10

## 2018-10-29 RX ADMIN — Medication 3 ML: at 02:10

## 2018-10-29 RX ADMIN — ATORVASTATIN CALCIUM 20 MG: 20 TABLET, FILM COATED ORAL at 08:10

## 2018-10-29 RX ADMIN — ENOXAPARIN SODIUM 40 MG: 100 INJECTION SUBCUTANEOUS at 05:10

## 2018-10-29 RX ADMIN — DOCUSATE SODIUM 100 MG: 100 CAPSULE, LIQUID FILLED ORAL at 08:10

## 2018-10-29 RX ADMIN — TOPIRAMATE 100 MG: 100 TABLET, FILM COATED ORAL at 10:10

## 2018-10-29 NOTE — PROCEDURES
DATE OF STUDY:  10/28/2018    ICU EEG AND VIDEO MONITORING REPORT    METHODOLOGY:  Electroencephalographic (EEG) is recorded with electrodes placed   according to the International 10-20 placement system.  Thirty two (32) channels   of digital signal (sampling rate of 512/sec), including T1 and T2, were   simultaneously recorded from the scalp and may include EKG, EMG, and/or eye   monitors.  Recording band pass was 0.1 to 512 Hz.  Digital video recording of   the patient is simultaneously recorded with the EEG.  The patient is instructed   to report clinical symptoms which may occur during the recording session.  EEG   and video recording are stored and archived in digital format.  Activation   procedures, which include photic stimulation, hyperventilation and instructing   patients to perform simple tasks, are done in selected patients.    The EEG is displayed on a monitor screen and can be reviewed using different   montages.  Computer-assisted analysis is employed to detect spike and   electrographic seizure activity.  The entire record is submitted for computer   analysis.  The entire recording is visually reviewed, and the times identified   by computer analysis as being spikes or seizures are reviewed again.      Compressed spectral analysis (CSA) is also performed on the activity recorded   from each individual channel.  This is displayed as a power display of   frequencies from 0 to 30 Hz over time.  The CSA is reviewed looking for   asymmetries in power between homologous areas of the scalp, then compared with   the original EEG recording.    [employer] software was also utilized in the review of this study.  This software   suite analyzes the EEG recording in multiple domains.  Coherence and rhythmicity   are computed to identify EEG sections which may contain organized seizures.    Each channel undergoes analysis to detect the presence of spike and sharp waves   which have special and morphological  characteristics of epileptic activity.  The   routine EEG recording is converted from special into frequency domain.  This is   then displayed comparing homologous areas to identify areas of significant   asymmetry.  Algorithm to identify non-cortically generated artifact is used to   separate artifact from the EEG.    RECORDING TIMES:  Start on 10/28/2018, at 12:21.  Stop on 10/29/2018, at 09:30.  Total duration of this portion of the study is 21 hours and 6 minutes.    EEG FINDINGS:  This record consists of medium amplitude activity with a mix   predominantly of alpha and beta frequencies with abundant 20 Hz to 25 Hz beta   activity seen both anteriorly and posteriorly.  Fragments of a 10 Hz posterior   dominant rhythm are occasionally visualized, but the posterior rhythm tends to   be dominated by beta activity, most of the record as well.  There is good   variability in the record.  Sleep is captured beginning at 2300 hours on   10/28/2018, with high amplitude sleep spindles and K complexes well visualized.    Normal sleep architecture is observed overnight.    No epileptiform discharges were seen at any point.  There were no focal   findings.  There were no electrographic seizures.    One clinical event was noted, which began during the time the electrodes were   being serviced, so the beginning was missed, but the conclusion of this episode   was recorded at approximately 0900 hours on 10/29/2018.  This consisted of the   patient exhibiting facial twitching and intermittent protrusion of the tongue   with decreased responsiveness.  This did not correlate with changes on EEG other   than artifact and was felt to be a nonepileptic event.  Baseline EEG was seen   during this event and immediately after this event.    IMPRESSION:  Normal waking and sleeping EEG with excessive beta activity that is   likely attributable to medication effect.  One event was partially captured on   this portion of the study, although  the beginning was not captured.  This   appeared to be a nonepileptic event as no EEG changes were seen during the   latter portion of the event or in the immediate period after that.  Semiology   was most suggestive of a psychogenic nonepileptic event.  Monitoring will   continue.      NBB/IN  dd: 10/29/2018 14:45:23 (CDT)  td: 10/29/2018 15:30:35 (CDT)  Doc ID   #[id number]  Job ID #[id number]    CC:

## 2018-10-29 NOTE — PLAN OF CARE
Problem: Physical Therapy Goal  Goal: Physical Therapy Goal  Outcome: Outcome(s) achieved Date Met: 10/29/18  Pt evaluation complete. Pt transferred to ICU after evaluation. Re-consult when medically appropriate.     MARY RUSS, PT  10/29/2018

## 2018-10-29 NOTE — HOSPITAL COURSE
10/27: Transfer to C from OSH  10/28: no acute events  10/29: Episode am, transferred to NCC team. No noted epileptiform activity during end of episode (eeg not running for initial part of episode)  10/30: No further episodes since transfer to ICU, planning for stepdown to floor for further workup. Recommend holding Topamax.  10/31: No episodes, stepped down to floor. Off Topamax.  11/1: Has an event today where she was hyperventilating. Was biting down on her tongue. No tonic-clonic movements. Vitals stable.

## 2018-10-29 NOTE — SUBJECTIVE & OBJECTIVE
Subjective:     Interval History: No acute events overnight. Says she feels slightly better compared to yesterday    Current Neurological Medications:     Current Facility-Administered Medications   Medication Dose Route Frequency Provider Last Rate Last Dose    acetaminophen tablet 650 mg  650 mg Oral Q4H PRN Ines Gillette PA-C   650 mg at 10/28/18 1619    albuterol-ipratropium 2.5 mg-0.5 mg/3 mL nebulizer solution 3 mL  3 mL Nebulization Q4H PRN Ines Gillette PA-C        amitriptyline tablet 100 mg  100 mg Oral QHS Ines Gillette PA-C   100 mg at 10/27/18 2033    aspirin EC tablet 81 mg  81 mg Oral Daily Ines Gillette PA-C   81 mg at 10/28/18 0841    atorvastatin tablet 20 mg  20 mg Oral QHS Ines Gillette PA-C   20 mg at 10/27/18 2034    cetirizine tablet 10 mg  10 mg Oral Daily Ines Gillette PA-C   10 mg at 10/28/18 0842    dextrose 50% injection 12.5 g  12.5 g Intravenous PRN Ines Gillette PA-C        dextrose 50% injection 25 g  25 g Intravenous PRN Ines Gillette PA-C        docusate sodium capsule 100 mg  100 mg Oral BID Ines Gillette PA-C   100 mg at 10/28/18 0842    enoxaparin injection 40 mg  40 mg Subcutaneous Daily Elaina Dillon MD   40 mg at 10/28/18 1619    glucagon (human recombinant) injection 1 mg  1 mg Intramuscular PRN Ines Gillette PA-C        glucose chewable tablet 16 g  16 g Oral PRN Ines Gillette PA-C        glucose chewable tablet 24 g  24 g Oral PRN Ines Gillette PA-C        insulin aspart U-100 pen 0-5 Units  0-5 Units Subcutaneous QID (AC + HS) PRN Ines Gillette PA-C        levothyroxine tablet 50 mcg  50 mcg Oral Before breakfast Ines Gillette PA-C   50 mcg at 10/28/18 0547    loperamide capsule 2 mg  2 mg Oral PRN Ines Gillette PA-C        montelukast tablet 10 mg  10 mg Oral QHS Ines Gillette PA-C   10 mg at 10/27/18 2035    multivitamin tablet 1 tablet  1  tablet Oral Daily Ines Gillette PA-C   1 tablet at 10/28/18 0842    ondansetron disintegrating tablet 8 mg  8 mg Oral Q8H PRN Ines Gillette PA-C        pantoprazole EC tablet 40 mg  40 mg Oral QHS Ines Gillette PA-C   40 mg at 10/27/18 2035    pantoprazole EC tablet 40 mg  40 mg Oral Daily Ines Gillette PA-C   40 mg at 10/28/18 0842    polyethylene glycol packet 17 g  17 g Oral TID PRN Ines Gillette PA-C        promethazine tablet 25 mg  25 mg Oral Q6H PRN Ines Gillette PA-C        sodium chloride 0.9% flush 3 mL  3 mL Intravenous Q8H Ines Gillette PA-C   3 mL at 10/28/18 1620    topiramate tablet 100 mg  100 mg Oral Daily Ines Gillette PA-C   100 mg at 10/28/18 0842    topiramate tablet 150 mg  150 mg Oral QHS Ines Gillette PA-C   150 mg at 10/27/18 2036    vitamin D 1000 units tablet 1,000 Units  1,000 Units Oral Daily Ines Gilltete PA-C   1,000 Units at 10/28/18 0841       Review of Systems   Constitutional: Negative for chills and fever.   Respiratory: Negative for shortness of breath.    Neurological: Positive for weakness.        Memory issues.     Objective:     Vital Signs (Most Recent):  Temp: 97.5 °F (36.4 °C) (10/28/18 0400)  Pulse: 95 (10/28/18 1917)  Resp: 13 (10/28/18 1917)  BP: 99/72 (10/28/18 1917)  SpO2: 95 % (10/28/18 1917) Vital Signs (24h Range):  Temp:  [96.5 °F (35.8 °C)-97.5 °F (36.4 °C)] 97.5 °F (36.4 °C)  Pulse:  [66-95] 95  Resp:  [13-22] 13  SpO2:  [94 %-99 %] 95 %  BP: ()/(62-78) 99/72     Weight: 67 kg (147 lb 11.3 oz)  Body mass index is 28.85 kg/m².    Physical Exam   Constitutional: She is oriented to person, place, and time. No distress.   Eyes: EOM are normal. Pupils are equal, round, and reactive to light.   Neurological: She is alert and oriented to person, place, and time.   Reflex Scores:       Bicep reflexes are 1+ on the right side.       Brachioradialis reflexes are 1+ on the right side and  1+ on the left side.       Patellar reflexes are 1+ on the right side and 1+ on the left side.       Achilles reflexes are 1+ on the right side and 1+ on the left side.  Nursing note and vitals reviewed.      NEUROLOGICAL EXAMINATION:     MENTAL STATUS   Oriented to person, place, and time.   Oriented to person.   Oriented to place.   Oriented to time.   Level of consciousness: alert       Speech normal. However, she asked to repeat words she has difficulty in getting words out.     CRANIAL NERVES     CN III, IV, VI   Pupils are equal, round, and reactive to light.  Extraocular motions are normal.     CN VII   Facial expression full, symmetric.     CN XI   Right sternocleidomastoid strength: weak  Left sternocleidomastoid strength: weak    CN XII   CN XII normal.     MOTOR EXAM   Right arm tone: spastic  Left arm tone: spastic  Right arm pronator drift: absent  Left arm pronator drift: absent    Strength   Right deltoid: 4/5  Left deltoid: 4/5  Right biceps: 4/5  Left biceps: 4/5  Right triceps: 4/5  Left triceps: 4/5  Right quadriceps: 3/5  Left quadriceps: 3/5  Right peroneal: 3/5  Left peroneal: 3/5  Right gastroc: 3/5  Left gastroc: 3/5    REFLEXES     Reflexes   Right brachioradialis: 1+  Left brachioradialis: 1+  Right biceps: 1+  Right patellar: 1+  Left patellar: 1+  Right achilles: 1+  Left achilles: 1+    GAIT AND COORDINATION     Tremor   Resting tremor: absent    Recent Labs   Lab 10/28/18  0639   WBC 5.28   RBC 4.53   HGB 13.5   HCT 41.4      MCV 91   MCH 29.8   MCHC 32.6     Recent Labs   Lab 10/28/18  0639   CALCIUM 9.2   PROT 7.0      K 3.7   CO2 23   *   BUN 19   CREATININE 1.0   ALKPHOS 136*   ALT 22   AST 13   BILITOT 0.4       Significant Imaging: I have reviewed all pertinent imaging results/findings within the past 24 hours.

## 2018-10-29 NOTE — ASSESSMENT & PLAN NOTE
49 yo female who presented as transfer from OSH for further workup of spells of transient neurologic symptoms. Extensive workup since onset with no noteable abnormal findings. Onset of episodes coincided with abrupt discontinuation of thyroid medication, but episodes have continued since it was resumed.    Recommendations:  - Episode this morning was not fully capture on EEG, but portion that was with no noteable epileptiform correlate  - Continue vEEG  - Ok to continue home Topiramate, no indication to start additional AED at this point  - Continued workup of other possible etiologies of events per primary team    Plan of care discussed with primary service, patient and  at bedside. Will continue to follow.

## 2018-10-29 NOTE — NURSING
Called to patient room by patient  at 0847, stated wife is having an episode. Upon entering patient room, noted patient lying in bed with blank. Eye dilated VVS. Primary team paged.   at bedside. Will continue to monitor.      9:12 AM  Patient breathing and panting

## 2018-10-29 NOTE — PROGRESS NOTES
[facility]  Neurology-Epilepsy  Progress Note    Patient Name: Vivian Peterson  MRN: [id number]  Admission Date: 10/27/2018  Hospital Length of Stay: 2 days  Code Status: Full Code   Attending Provider: Elaina Dillon MD  Primary Care Physician: Provider Notinsystem   Principal Problem:Altered mental status    Subjective:     Hospital Course:   [place]: [place] [facility] [place]  10/28: no acute events  10/29: Episode am, transferred to [facility] team. No noted epileptiform activity during end of episode (eeg not running for initial part of episode)    Interval History: Event this morning,  reports it started as blank stare and unresponsiveness for 10-15 minutes, followed by 15-20 minutes of head/neck shaking (side to side per ), and then 10-15 mins of blank stare before patient began to return to baseline. No noted epileptiform correlate on EEG during portion that was recorded. Transferred to [facility] for further workup.     Current Facility-Administered Medications   Medication Dose Route Frequency Provider Last Rate Last Dose    0.9%  NaCl infusion   Intravenous Continuous Elaina Dillon  mL/hr at 10/29/18 1600      acetaminophen tablet 650 mg  650 mg Oral Q4H PRN Ines Gillette PA-C   650 mg at 10/28/18 2154    albuterol-ipratropium 2.5 mg-0.5 mg/3 mL nebulizer solution 3 mL  3 mL Nebulization Q4H PRN Ines Gillette PA-C        amitriptyline tablet 100 mg  100 mg Oral QHS Ines Gillette PA-C   100 mg at 10/28/18 2121    aspirin EC tablet 81 mg  81 mg Oral Daily NANCY YuanC   81 mg at 10/29/18 1057    atorvastatin tablet 20 mg  20 mg Oral QHS Ines Gillette PA-C   20 mg at 10/28/18 2122    cetirizine tablet 10 mg  10 mg Oral Daily Ines Gillette PA-C   10 mg at 10/29/18 1057    dextrose 50% injection 12.5 g  12.5 g Intravenous PRN Ines Gillette PA-C        dextrose 50% injection 25 g  25 g Intravenous PRN Ines Gillette PA-C         docusate sodium capsule 100 mg  100 mg Oral BID Ines Gillette PA-C   100 mg at 10/29/18 1057    enoxaparin injection 40 mg  40 mg Subcutaneous Daily Elaina Dillon MD   40 mg at 10/28/18 1619    glucagon (human recombinant) injection 1 mg  1 mg Intramuscular PRN Ines Gillette PA-C        glucose chewable tablet 16 g  16 g Oral PRN Ines Gillette PA-C        glucose chewable tablet 24 g  24 g Oral PRN Ines Gillette PA-C        insulin aspart U-100 pen 0-5 Units  0-5 Units Subcutaneous QID (AC + HS) PRN Ines Gillette PA-C        levothyroxine tablet 50 mcg  50 mcg Oral Before breakfast Ines Gillette PA-C   50 mcg at 10/29/18 0609    loperamide capsule 2 mg  2 mg Oral PRN Ines Gillette PA-C        montelukast tablet 10 mg  10 mg Oral QHS Ines Gillette PA-C   10 mg at 10/28/18 2122    multivitamin tablet 1 tablet  1 tablet Oral Daily Ines Gillette PA-C   1 tablet at 10/29/18 1059    ondansetron disintegrating tablet 8 mg  8 mg Oral Q8H PRN Ines Gillette PA-C        pantoprazole EC tablet 40 mg  40 mg Oral Daily Ines Gillette PA-C   40 mg at 10/29/18 1058    polyethylene glycol packet 17 g  17 g Oral TID PRN Ines Gillette PA-C        promethazine tablet 25 mg  25 mg Oral Q6H PRN Ines Gillette PA-C        sodium chloride 0.9% flush 3 mL  3 mL Intravenous Q8H Ines Gillette PA-C   3 mL at 10/29/18 1433    topiramate tablet 100 mg  100 mg Oral Daily Ines Gillette PA-C   100 mg at 10/29/18 1058    topiramate tablet 150 mg  150 mg Oral QHS Ines Gillette PA-C   150 mg at 10/28/18 2122    vitamin D 1000 units tablet 1,000 Units  1,000 Units Oral Daily Ines Gillette PA-C   1,000 Units at 10/29/18 1058     Continuous Infusions:   sodium chloride 0.9% 100 mL/hr at 10/29/18 1600       Review of Systems   Constitutional: Negative for chills, fatigue and fever.   Respiratory: Negative for cough  and shortness of breath.    Cardiovascular: Negative for chest pain and leg swelling.   Gastrointestinal: Negative for nausea and vomiting.   Musculoskeletal: Positive for gait problem. Negative for back pain and myalgias.   Skin: Negative for pallor, rash and wound.   Neurological: Positive for speech difficulty (intermittent), numbness (BLE reported) and headaches. Negative for dizziness, tremors and facial asymmetry.   Psychiatric/Behavioral: Negative for agitation and confusion. The patient is not hyperactive.      Objective:     Vital Signs (Most Recent):  Temp: 98.4 °F (36.9 °C) (10/29/18 1215)  Pulse: 91 (10/29/18 1600)  Resp: (!) 23 (10/29/18 1600)  BP: 106/61 (10/29/18 1600)  SpO2: 96 % (10/29/18 1600) Vital Signs (24h Range):  Temp:  [96.4 °F (35.8 °C)-99.2 °F (37.3 °C)] 98.4 °F (36.9 °C)  Pulse:  [] 91  Resp:  [13-30] 23  SpO2:  [92 %-100 %] 96 %  BP: ()/(45-74) 106/61     Weight: 67 kg (147 lb 11.3 oz)  Body mass index is 28.85 kg/m².    Physical Exam   Constitutional: She is oriented to person, place, and time. She appears well-developed and well-nourished. No distress.   HENT:   Head: Normocephalic and atraumatic.   Right Ear: External ear normal.   Left Ear: External ear normal.   Nose: Nose normal.   Eyes: Conjunctivae and EOM are normal. Pupils are equal, round, and reactive to light.   Neck: Normal range of motion. No thyromegaly present.   Cardiovascular: Intact distal pulses.   Pulmonary/Chest: Effort normal. No respiratory distress.   Musculoskeletal: Normal range of motion. She exhibits no deformity.   Neurological: She is oriented to person, place, and time. She has an abnormal Finger-Nose-Finger Test (slowed, brings finger to face instead of nose).   Skin: Skin is warm and dry. She is not diaphoretic. No erythema.   Psychiatric: She is slowed (intermittent).       NEUROLOGICAL EXAMINATION:     MENTAL STATUS   Oriented to person, place, and time.   Level of consciousness: alert        Oriented to person, , place, year, president. Slow to answer questions at times although speech generally fluent.     CRANIAL NERVES     CN III, IV, VI   Pupils are equal, round, and reactive to light.  Extraocular motions are normal.     CN V   Facial sensation intact.     CN VII   Facial expression full, symmetric.     CN VIII   CN VIII normal.     CN IX, X   CN IX normal.   CN X normal.     CN XI   Right sternocleidomastoid strength: weak  Left sternocleidomastoid strength: weak    CN XII   CN XII normal.     MOTOR EXAM        BUE 4/5 strength  Decreased BLE strength, ? Full effort     GAIT AND COORDINATION      Coordination   Finger to nose coordination: abnormal (slowed, brings finger to face instead of nose)      Significant Labs: All pertinent lab results from the past 24 hours have been reviewed.    Significant Studies: I have reviewed all pertinent imaging results/findings within the past 24 hours.    Assessment and Plan:     * Altered mental status    47 yo female who presented as transfer from OSH for further workup of spells of transient neurologic symptoms. Extensive workup since onset with no noteable abnormal findings. Onset of episodes coincided with abrupt discontinuation of thyroid medication, but episodes have continued since it was resumed.    Recommendations:  - Episode this morning was not fully capture on EEG, but portion that was with no noteable epileptiform correlate  - Continue vEEG  - Ok to continue home Topiramate, no indication to start additional AED at this point  - Continued workup of other possible etiologies of events per primary team    Plan of care discussed with primary service, patient and  at bedside. Will continue to follow.      MAURO (obstructive sleep apnea)    - CPAP qHS     Migraines    - Reports history of migraines since childhood  - Continue home Amitriptyline 100 mg qHS,  mg am/150 mg qHS     Asthma    - On home singulair, montelukast, CPAP qHS      GERD (gastroesophageal reflux disease)    - Home pantoprazole      HLD (hyperlipidemia)    - Home Lipitor     Hypothyroidism due to Hashimoto's thyroiditis    - Home Levothyroxine 50 mcg daily     Prediabetes    - SSI coverage per primary         VTE Risk Mitigation (From admission, onward)        Ordered     enoxaparin injection 40 mg  Daily      10/28/18 1512     Place sequential compression device  Until discontinued      10/27/18 0515     Place HERBIE hose  Until discontinued      10/27/18 0515     IP VTE HIGH RISK PATIENT  Once      10/27/18 0515          María Moeller PA-C  Neurology-Epilepsy  [facility]  Staff: Dr. Sommer

## 2018-10-29 NOTE — PROGRESS NOTES
Pt arrived to SICU room 95705 via RN. Pt attached to SICU monitor. All VSS at this time. Pt on room air. Full assessment performed. Pt attached to EEG. Neuro ICU team notified of patient's arrival. No new orders received at this time. Will continue to monitor pt closely.

## 2018-10-29 NOTE — SUBJECTIVE & OBJECTIVE
Interval History: Event this morning,  reports it started as blank stare and unresponsiveness for 10-15 minutes, followed by 15-20 minutes of head/neck shaking (side to side per ), and then 10-15 mins of blank stare before patient began to return to baseline. No noted epileptiform correlate on EEG during portion that was recorded. Transferred to [facility] for further workup.     Current Facility-Administered Medications   Medication Dose Route Frequency Provider Last Rate Last Dose    0.9%  NaCl infusion   Intravenous Continuous Elaina Dillon  mL/hr at 10/29/18 1600      acetaminophen tablet 650 mg  650 mg Oral Q4H PRN Ines Gillette PA-C   650 mg at 10/28/18 2154    albuterol-ipratropium 2.5 mg-0.5 mg/3 mL nebulizer solution 3 mL  3 mL Nebulization Q4H PRN Ines Gillette PA-C        amitriptyline tablet 100 mg  100 mg Oral QHS NANCY YuanC   100 mg at 10/28/18 2121    aspirin EC tablet 81 mg  81 mg Oral Daily NGOZI Yuan-C   81 mg at 10/29/18 1057    atorvastatin tablet 20 mg  20 mg Oral QHS Ines Gillette PA-C   20 mg at 10/28/18 2122    cetirizine tablet 10 mg  10 mg Oral Daily NANCY YuanC   10 mg at 10/29/18 1057    dextrose 50% injection 12.5 g  12.5 g Intravenous PRN Ines Gillette PA-C        dextrose 50% injection 25 g  25 g Intravenous PRN Ines Gillette PA-C        docusate sodium capsule 100 mg  100 mg Oral BID Ines Gillette PA-C   100 mg at 10/29/18 1057    enoxaparin injection 40 mg  40 mg Subcutaneous Daily Elaina Dillon MD   40 mg at 10/28/18 1619    glucagon (human recombinant) injection 1 mg  1 mg Intramuscular PRN Ines Gillette PA-C        glucose chewable tablet 16 g  16 g Oral PRN NGOZI Yuan-AVA        glucose chewable tablet 24 g  24 g Oral PRN Ines B. Swords, PA-C        insulin aspart U-100 pen 0-5 Units  0-5 Units Subcutaneous QID (AC + HS) PRN Ines Gillette,  VANDANA        levothyroxine tablet 50 mcg  50 mcg Oral Before breakfast Ines Gillette PA-C   50 mcg at 10/29/18 0609    loperamide capsule 2 mg  2 mg Oral PRN Ines Gillette PA-C        montelukast tablet 10 mg  10 mg Oral QHS Ines Gillette PA-C   10 mg at 10/28/18 2122    multivitamin tablet 1 tablet  1 tablet Oral Daily Ines Gillette PA-C   1 tablet at 10/29/18 1059    ondansetron disintegrating tablet 8 mg  8 mg Oral Q8H PRN Ines Gillette PA-C        pantoprazole EC tablet 40 mg  40 mg Oral Daily Ines Gillette PA-C   40 mg at 10/29/18 1058    polyethylene glycol packet 17 g  17 g Oral TID PRN Ines Gillette PA-C        promethazine tablet 25 mg  25 mg Oral Q6H PRN Ines Gillette PA-C        sodium chloride 0.9% flush 3 mL  3 mL Intravenous Q8H Ines Gillette PA-C   3 mL at 10/29/18 1433    topiramate tablet 100 mg  100 mg Oral Daily Ines Gillette PA-C   100 mg at 10/29/18 1058    topiramate tablet 150 mg  150 mg Oral QHS Ines Gillette PA-C   150 mg at 10/28/18 2122    vitamin D 1000 units tablet 1,000 Units  1,000 Units Oral Daily Ines Gillette PA-C   1,000 Units at 10/29/18 1058     Continuous Infusions:   sodium chloride 0.9% 100 mL/hr at 10/29/18 1600       Review of Systems   Constitutional: Negative for chills, fatigue and fever.   Respiratory: Negative for cough and shortness of breath.    Cardiovascular: Negative for chest pain and leg swelling.   Gastrointestinal: Negative for nausea and vomiting.   Musculoskeletal: Positive for gait problem. Negative for back pain and myalgias.   Skin: Negative for pallor, rash and wound.   Neurological: Positive for speech difficulty (intermittent), numbness (BLE reported) and headaches. Negative for dizziness, tremors and facial asymmetry.   Psychiatric/Behavioral: Negative for agitation and confusion. The patient is not hyperactive.      Objective:     Vital Signs (Most  Recent):  Temp: 98.4 °F (36.9 °C) (10/29/18 1215)  Pulse: 91 (10/29/18 1600)  Resp: (!) 23 (10/29/18 1600)  BP: 106/61 (10/29/18 1600)  SpO2: 96 % (10/29/18 1600) Vital Signs (24h Range):  Temp:  [96.4 °F (35.8 °C)-99.2 °F (37.3 °C)] 98.4 °F (36.9 °C)  Pulse:  [] 91  Resp:  [13-30] 23  SpO2:  [92 %-100 %] 96 %  BP: ()/(45-74) 106/61     Weight: 67 kg (147 lb 11.3 oz)  Body mass index is 28.85 kg/m².    Physical Exam   Constitutional: She is oriented to person, place, and time. She appears well-developed and well-nourished. No distress.   HENT:   Head: Normocephalic and atraumatic.   Right Ear: External ear normal.   Left Ear: External ear normal.   Nose: Nose normal.   Eyes: Conjunctivae and EOM are normal. Pupils are equal, round, and reactive to light.   Neck: Normal range of motion. No thyromegaly present.   Cardiovascular: Intact distal pulses.   Pulmonary/Chest: Effort normal. No respiratory distress.   Musculoskeletal: Normal range of motion. She exhibits no deformity.   Neurological: She is oriented to person, place, and time. She has an abnormal Finger-Nose-Finger Test (slowed, brings finger to face instead of nose).   Skin: Skin is warm and dry. She is not diaphoretic. No erythema.   Psychiatric: She is slowed (intermittent).       NEUROLOGICAL EXAMINATION:     MENTAL STATUS   Oriented to person, place, and time.   Level of consciousness: alert       Oriented to person, , place, year, president. Slow to answer questions at times although speech generally fluent.     CRANIAL NERVES     CN III, IV, VI   Pupils are equal, round, and reactive to light.  Extraocular motions are normal.     CN V   Facial sensation intact.     CN VII   Facial expression full, symmetric.     CN VIII   CN VIII normal.     CN IX, X   CN IX normal.   CN X normal.     CN XI   Right sternocleidomastoid strength: weak  Left sternocleidomastoid strength: weak    CN XII   CN XII normal.     MOTOR EXAM        BUE 4/5  strength  Decreased BLE strength, ? Full effort     GAIT AND COORDINATION      Coordination   Finger to nose coordination: abnormal (slowed, brings finger to face instead of nose)      Significant Labs: All pertinent lab results from the past 24 hours have been reviewed.    Significant Studies: I have reviewed all pertinent imaging results/findings within the past 24 hours.

## 2018-10-29 NOTE — PT/OT/SLP EVAL
Physical Therapy Evaluation and Discharge Note    Patient Name:  Vivian Peterson   MRN:  [id number]    Recommendations:     Discharge Recommendations:  outpatient PT(family assist)   Discharge Equipment Recommendations: other (see comments)(TBD)   Barriers to discharge: Inaccessible home    Assessment:     Vivian Peterson is a 48 y.o. female admitted with a medical diagnosis of Altered mental status. Pt performed bed mobility CGA and transfers CGA. Pt transferred to ICU after evalation.      Recent Surgery: * No surgery found *      Plan:     D/C PT. Re-consult when medically appropriate.     Subjective     Chief Complaint: gait instability  Patient/Family Comments/goals: return to PLOF  Pain/Comfort:  · Pain Rating 1: 0/10  · Pain Rating Post-Intervention 1: 0/10    Patients cultural, spiritual, Jew conflicts given the current situation:      Living Environment:  Pt lives with  in mobile home with 5-6 JETHRO with B handrails and walk-in shower with built-in seat. Pt reports (I) with ADLs and amb. 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 Pt  reports he is retired and able to provide assist upon d/c.   Prior to admission, patients level of function was (I).  Equipment used at home: none.  DME owned (not currently used): none.  Upon discharge, patient will have assistance from .    Objective:     Communicated with RN prior to session.  Patient found supine in bed and  present upon PT entry to room found with: EEG     General Precautions: Standard, fall, seizure   Orthopedic Precautions:N/A   Braces: N/A     Exams:  · Cognitive Exam:  Patient is oriented to Person, Place, Time and Situation  · Gross Motor Coordination:  WFL  · Postural Exam:  Patient presented with the following abnormalities:    · -       No postural abnormalities identified  · Sensation:    · -       Intact  light/touch B LE  · Skin Integrity/Edema:      · -       Skin integrity: Visible skin intact  · RLE ROM:  WFL  · RLE Strength: WFL  · LLE ROM: WFL  · LLE Strength: WFL    Functional Mobility:  Bed Mobility:     · Supine to Sit: contact guard assistance, pt lifted B LE with B UE off of bed   · Sit to Supine: contact guard assistance, pt reported unable to lift B LE into bed, PT provided CGA at B LE and pt able to perform     Transfers:     · Sit to Stand:  contact guard assistance with no AD    amb not performed 2* pt fatigue.     AM-PAC 6 CLICK MOBILITY  Total Score:16     Therapeutic Activities and Exercises:  Pt stood EOB for ~3min; B knee ext, ankle DF, fwd trunk lean, and hip flex.  PT provided CGA to B ankle and knee and OT provided CGA at pt trunk and hips for proper upright posture.  Pt performed WS with knee con/ecc with CGA, no buckling or weakness noted; isometric hold between tactile cues.  Pt and  educated on:  -role of PT/POC  -safety with mobility  -importance of OOB activity     AM-PAC 6 CLICK MOBILITY  Total Score:16     Patient left with bed in chair position with all lines intact, call button in reach, RN notified and  present.    GOALS:   Multidisciplinary Problems     Physical Therapy Goals     Not on file          Multidisciplinary Problems (Resolved)        Problem: Physical Therapy Goal    Goal Priority Disciplines Outcome Goal Variances Interventions   Physical Therapy Goal   (Resolved)     PT, PT/OT Outcome(s) achieved                     History:     Past Medical History:   Diagnosis Date    Asthma     GERD (gastroesophageal reflux disease)     HLD (hyperlipidemia)     Hypothyroidism due to Hashimoto's thyroiditis     Migraines     MAURO (obstructive sleep apnea)     Prediabetes        Past Surgical History:   Procedure Laterality Date    SHOULDER SURGERY Left        Clinical Decision Making:     Decision Making/ Complexity Score   On examination of body system using standardized tests and measures patient presents with 3 or more elements from any of the following: body  structures and functions, activity limitations, and/or participation restrictions.  Leading to a clinical presentation that is considered evolving with changing characteristics                              Clinical Decision Making  (Eval Complexity):  Moderate - 05066     Time Tracking:     PT Received On: 10/29/18  PT Start Time: 1035     PT Stop Time: 1054  PT Total Time (min): 19 min     Billable Minutes: Evaluation 19      MARY RUSS, PT  10/29/2018

## 2018-10-29 NOTE — PT/OT/SLP EVAL
Occupational Therapy   Evaluation and Discharge Note    Name: Vivian Peterson  MRN: [id number]  Admitting Diagnosis:  Altered mental status      Recommendations:     Discharge Recommendations: outpatient OT(and family assist )  Discharge Equipment Recommendations:  (TBD )  Barriers to discharge:  None    History:     Occupational Profile:  Living Environment: Pt lives with  in mobile home with 5-6 steps to enter and B HR. She has walk in shower with built in seat. She prefers using deep tub for bathing.   Previous level of function: Pt was independent with ADL and IADL tasks, including driving. She works full time as a special .   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  present for session. Eval completed with PT.   Pain/Comfort:  · Pain Rating 1: 0/10    Patients cultural, spiritual, Gnosticist conflicts given the current situation: none reported     Objective:     Patient found with: EEG    General Precautions: Standard, fall, seizure   Orthopedic Precautions:N/A   Braces: N/A     Occupational Performance:    Bed Mobility:    · Patient completed Rolling/Turning to Right with contact guard assistance  · Patient completed Scooting/Bridging with contact guard assistance in forward and backward planes at EOB   · CGA for lateral scooting to R x2 trials while seated EOB   · Patient completed Supine to  Sit with contact guard assistance  · Patient completed Sit to Supine with contact guard assistance    Functional Mobility/Transfers:  · Patient completed Sit <> Stand Transfer with contact guard assistance  with  no assistive device    · Stood ~3 min with CGA   · Facilitation of thoracic extension  · Performed weight shifting   · Functional Mobility: not appropriate this date due to pt's reports of BLE weakness and incoordination    Activities of Daily Living:  · Lower Body Dressing: stand by assistance to don B socks while seated in 4 point position    Cognitive/Visual Perceptual:  Cognitive/Psychosocial Skills:     -       Oriented to: Person, Place, Time and Situation   -       Follows Commands/attention:Follows multistep  commands  -       Communication: clear/fluent  -       Memory: No Deficits noted  -       Safety awareness/insight to disability: impaired   -       Mood/Affect/Coping skills/emotional control: Appropriate to situation  Visual/Perceptual:      -Intact     Physical Exam:  Postural examination/scapula alignment:    -       Rounded shoulders  -       Posterior pelvic tilt  Skin integrity: Visible skin intact  Edema:  None noted  Sensation:    -       Intact BUE  Motor Planning:    -       intact   Dominant hand:    -       R  Upper Extremity Range of Motion:     -       Right Upper Extremity: WFL   -       Left Upper Extremity: WFL    Upper Extremity Strength:   -       Right Upper Extremity: WFL   -       Left Upper Extremity: WFL     Strength:   -       Right Upper Extremity: WFL   -       Left Upper Extremity: WFL    Fine Motor Coordination:    -       Intact  Left hand thumb/finger opposition skills, Right hand thumb/finger opposition skills, Left hand, manipulation of objects and Right hand, manipulation of objects    Patient left with bed in chair position with all lines intact, call button in reach and  present    AMPAC 6 Click:  AMPAC Total Score: 22    Treatment &  "Education:  -Edu for OT role and POC  -Edu for importance of OOB activity and impact on healing  -Whiteboard updated   -Questions and concerns addressed within OT scope of practice   Education:    Assessment:     Vivian Peterson is a 48 y.o. female with a medical diagnosis of Altered mental status. Pt DC from acute OT at this time due to t/f to ICU after eval.     Clinical Decision Makin.  OT Low:  "Pt evaluation falls under low complexity for evaluation coding due to performance deficits noted in 1-3 areas as stated above and 0 co-morbities affecting current functional status. Data obtained from problem focused assessments. No modifications or assistance was required for completion of evaluation. Only brief occupational profile and history review completed."     Plan:     Please re-consult when medically appropriate.    · Plan of Care Reviewed with: patient, spouse    Time Tracking:     OT Date of Treatment: 10/29/18  OT Start Time: 1035  OT Stop Time: 1056  OT Total Time (min): 21 min    Billable Minutes:Evaluation 13  Therapeutic Activity 8    ESTELA Miller  10/29/2018    "

## 2018-10-29 NOTE — PLAN OF CARE
Problem: Occupational Therapy Goal  Goal: Occupational Therapy Goal  Outcome: Outcome(s) achieved Date Met: 10/29/18  Goals not set. Pt eval and DC this date due to pt t/f to ICU after eval. Please re-consult when medically appropriate. ESTELA Miller 10/29/2018

## 2018-10-29 NOTE — PROGRESS NOTES
Patient unable to go for lower extremity US because of EEG monitoring. Pt refused to be disconnected from monitor. On call service notified.

## 2018-10-29 NOTE — PLAN OF CARE
"Problem: Patient Care Overview  Goal: Plan of Care Review  PMH: DM, GERD,asthma, HLD, MAURO, hypothyroidism, migraines, thyroid storm    10/27: ED with AMS, abnormal gait, "seziure like" episodes   10/29: Core call to SICU for seizure like episode, continuous EEG      Outcome: Ongoing (interventions implemented as appropriate)  All VSS. Pt on room air. Continuous EEG in place. Pt AAOx4. Pt moves bilateral upper extremities, but does have BLE weakness. Gtts: NS @ 100ml/hr. Pt voids per bedpan. Plan of care reviewed with pt and pt's spouse. All questions answered. See flowsheet for full assessment. Will continue to monitor pt closely.       "

## 2018-10-29 NOTE — PROGRESS NOTES
[facility]  Neurology  Progress Note    Patient Name: Vivian Peterson  MRN: [id number]  Admission Date: 10/27/2018  Hospital Length of Stay: 1 days  Code Status: Full Code   Attending Provider: Elaina Dillon MD  Primary Care Physician: Provider Notinsystem   Principal Problem:Altered mental status      Subjective:     Interval History: No acute events overnight. Says she feels slightly better compared to yesterday    Current Neurological Medications:     Current Facility-Administered Medications   Medication Dose Route Frequency Provider Last Rate Last Dose    acetaminophen tablet 650 mg  650 mg Oral Q4H PRN Ines Gillette PA-C   650 mg at 10/28/18 1619    albuterol-ipratropium 2.5 mg-0.5 mg/3 mL nebulizer solution 3 mL  3 mL Nebulization Q4H PRN Ines Gillette PA-C        amitriptyline tablet 100 mg  100 mg Oral QHS NGOZI Yuan-C   100 mg at 10/27/18 2033    aspirin EC tablet 81 mg  81 mg Oral Daily NGOZI Yuan-C   81 mg at 10/28/18 0841    atorvastatin tablet 20 mg  20 mg Oral QHS NGOZI Yuan-C   20 mg at 10/27/18 2034    cetirizine tablet 10 mg  10 mg Oral Daily NANCY YuanC   10 mg at 10/28/18 0842    dextrose 50% injection 12.5 g  12.5 g Intravenous PRN Ines Gillette PA-C        dextrose 50% injection 25 g  25 g Intravenous PRN Ines Gillette PA-C        docusate sodium capsule 100 mg  100 mg Oral BID NGOZI Yuan-C   100 mg at 10/28/18 0842    enoxaparin injection 40 mg  40 mg Subcutaneous Daily Elaina Dillon MD   40 mg at 10/28/18 1619    glucagon (human recombinant) injection 1 mg  1 mg Intramuscular PRN Ines Gillette PA-C        glucose chewable tablet 16 g  16 g Oral PRN Ines Gillette PA-C        glucose chewable tablet 24 g  24 g Oral PRN Ines Gillette PA-C        insulin aspart U-100 pen 0-5 Units  0-5 Units Subcutaneous QID (AC + HS) PRN NANCY YuanC         levothyroxine tablet 50 mcg  50 mcg Oral Before breakfast Ines Gillette PA-C   50 mcg at 10/28/18 0547    loperamide capsule 2 mg  2 mg Oral PRN Ines Gillette PA-C        montelukast tablet 10 mg  10 mg Oral QHS Ines Gillette PA-C   10 mg at 10/27/18 2035    multivitamin tablet 1 tablet  1 tablet Oral Daily Ines Gillette PA-C   1 tablet at 10/28/18 0842    ondansetron disintegrating tablet 8 mg  8 mg Oral Q8H PRN Ines Gillette PA-C        pantoprazole EC tablet 40 mg  40 mg Oral QHS Ines Gillette PA-C   40 mg at 10/27/18 2035    pantoprazole EC tablet 40 mg  40 mg Oral Daily Ines Gillette PA-C   40 mg at 10/28/18 0842    polyethylene glycol packet 17 g  17 g Oral TID PRN Ines Gillette PA-C        promethazine tablet 25 mg  25 mg Oral Q6H PRN Ines Gillette PA-C        sodium chloride 0.9% flush 3 mL  3 mL Intravenous Q8H Ines Gillette PA-C   3 mL at 10/28/18 1620    topiramate tablet 100 mg  100 mg Oral Daily Ines Gillette PA-C   100 mg at 10/28/18 0842    topiramate tablet 150 mg  150 mg Oral QHS Ines Gillette PA-C   150 mg at 10/27/18 2036    vitamin D 1000 units tablet 1,000 Units  1,000 Units Oral Daily Ines Gillette PA-C   1,000 Units at 10/28/18 0841       Review of Systems   Constitutional: Negative for chills and fever.   Respiratory: Negative for shortness of breath.    Neurological: Positive for weakness.        Memory issues.     Objective:     Vital Signs (Most Recent):  Temp: 97.5 °F (36.4 °C) (10/28/18 0400)  Pulse: 95 (10/28/18 1917)  Resp: 13 (10/28/18 1917)  BP: 99/72 (10/28/18 1917)  SpO2: 95 % (10/28/18 1917) Vital Signs (24h Range):  Temp:  [96.5 °F (35.8 °C)-97.5 °F (36.4 °C)] 97.5 °F (36.4 °C)  Pulse:  [66-95] 95  Resp:  [13-22] 13  SpO2:  [94 %-99 %] 95 %  BP: ()/(62-78) 99/72     Weight: 67 kg (147 lb 11.3 oz)  Body mass index is 28.85 kg/m².    Physical Exam   Constitutional: She  is oriented to person, place, and time. No distress.   Eyes: EOM are normal. Pupils are equal, round, and reactive to light.   Neurological: She is alert and oriented to person, place, and time.   Reflex Scores:       Bicep reflexes are 1+ on the right side.       Brachioradialis reflexes are 1+ on the right side and 1+ on the left side.       Patellar reflexes are 1+ on the right side and 1+ on the left side.       Achilles reflexes are 1+ on the right side and 1+ on the left side.  Nursing note and vitals reviewed.      NEUROLOGICAL EXAMINATION:     MENTAL STATUS   Oriented to person, place, and time.   Oriented to person.   Oriented to place.   Oriented to time.   Level of consciousness: alert       Speech normal. However, she asked to repeat words she has difficulty in getting words out.     CRANIAL NERVES     CN III, IV, VI   Pupils are equal, round, and reactive to light.  Extraocular motions are normal.     CN VII   Facial expression full, symmetric.     CN XI   Right sternocleidomastoid strength: weak  Left sternocleidomastoid strength: weak    CN XII   CN XII normal.     MOTOR EXAM   Right arm tone: spastic  Left arm tone: spastic  Right arm pronator drift: absent  Left arm pronator drift: absent    Strength   Right deltoid: 4/5  Left deltoid: 4/5  Right biceps: 4/5  Left biceps: 4/5  Right triceps: 4/5  Left triceps: 4/5  Right quadriceps: 3/5  Left quadriceps: 3/5  Right peroneal: 3/5  Left peroneal: 3/5  Right gastroc: 3/5  Left gastroc: 3/5    REFLEXES     Reflexes   Right brachioradialis: 1+  Left brachioradialis: 1+  Right biceps: 1+  Right patellar: 1+  Left patellar: 1+  Right achilles: 1+  Left achilles: 1+    GAIT AND COORDINATION     Tremor   Resting tremor: absent    Recent Labs   Lab 10/28/18  0639   WBC 5.28   RBC 4.53   HGB 13.5   HCT 41.4      MCV 91   MCH 29.8   MCHC 32.6     Recent Labs   Lab 10/28/18  0639   CALCIUM 9.2   PROT 7.0      K 3.7   CO2 23   *   BUN 19  "  CREATININE 1.0   ALKPHOS 136*   ALT 22   AST 13   BILITOT 0.4       Significant Imaging: I have reviewed all pertinent imaging results/findings within the past 24 hours.    Assessment and Plan:     * Altered mental status    47 y/o female presenting as a transfer from [facility] with unusual presentation of intermittent episodes over a span of 7 months involving multiple neurologic deficits (abnormal gait ranging from mild to severe/unable to walk, speech difficulty, "staring spells" closely described as a catatonic state?, syncope, decreased concentration/memory lapses). Patient has had an extensive work-up including imaging since her initial episode in February 2018; thus far, no abnormal findings have been reported. Differential diagnosis includes: epilepsy, recurrent seizures 2/2 underlying infectious process or metabolic derangement, neurodegenerative disorder, recurrent strokes. While always hesitant to claim as a diagnosis of exclusion, conversion disorder needs to be considered in this patient given her unusual presentation and inconsistent findings on physical exam, negative findings despite appropriate neurologic work-up so far, and her high-functioning baseline status.    Recommendations:  -- MRI brain and spine unremarkable.  -- vEEG pending   -- No need for repeat lumbar puncture at this time as patient had negative results in September  -- Fall precautions, given patient's severely unsteady gait  -- PT/OT         VTE Risk Mitigation (From admission, onward)        Ordered     enoxaparin injection 40 mg  Daily      10/28/18 1512     Place sequential compression device  Until discontinued      10/27/18 0515     Place HERBIE hose  Until discontinued      10/27/18 0515     IP VTE HIGH RISK PATIENT  Once      10/27/18 0515        Will continue to follow. Please call with any questions.    Praneeth Rock MD  Neurology  [facility]  "

## 2018-10-29 NOTE — PROGRESS NOTES
Physician Attestation for Scribe:  I, Elaina Dillon MD, personally performed the services described in this documentation. All medical record entries made by the scribe were at my direction and in my presence.  I have reviewed the chart and agree that the record reflects my personal performance and is accurate and complete.   Elaina Dillon MD    Hospital Medicine  Progress note    Patient Name: Vivian Peterson  MRN: [id number]  Team: [facility] MED D Elaina Dillon MD  Admit Date: 10/27/2018  Code status: Full Code    Principal Problem:  Altered mental status    Interval hx: Patient with no new complaints.    ROS   Respiratory: no cough or shortness of breath  Cardiovascular: no chest pain or palpitations  Gastrointestinal: no nausea or vomiting, no abdominal pain or change in bowel habits  Behavioral/Psych: no depression or anxiety    PEx  Temp:  [96.5 °F (35.8 °C)-97.5 °F (36.4 °C)]   Pulse:  [66-95]   Resp:  [13-20]   BP: ()/(62-78)   SpO2:  [94 %-99 %]     Intake/Output Summary (Last 24 hours) at 10/28/2018 2103  Last data filed at 10/28/2018 0600  Gross per 24 hour   Intake 60 ml   Output 360 ml   Net -300 ml       General Appearance: no acute distress   Heart: regular rate and rhythm  Respiratory: Normal respiratory effort, no crackles   Abdomen: Soft, non-tender; bowel sounds active  Skin: intact.   Neurologic:  No focal numbness or weakness  Mental status: Alert. Delayed responses.     Recent Labs   Lab 10/27/18  0630 10/28/18  0639   WBC 5.87 5.28   HGB 13.8 13.5   HCT 41.8 41.4    193     Recent Labs   Lab 10/27/18  0630 10/28/18  0639    140   K 3.8 3.7   * 111*   CO2 19* 23   BUN 15 19   CREATININE 0.8 1.0   GLU 90 90   CALCIUM 9.5 9.2   MG 2.2  --    PHOS 3.4  --      Recent Labs   Lab 10/27/18  0630 10/28/18  0639   ALKPHOS 130 136*   ALT 21 22   AST 13 13   ALBUMIN 4.3 4.0   PROT 7.5 7.0   BILITOT 0.4 0.4      Recent Labs   Lab 10/27/18  0537 10/27/18  1848 10/27/18  2036  "10/28/18  0748 10/28/18  1303 10/28/18  1622   POCTGLUCOSE 79 73 138* 87 118* 104       Scheduled Meds:   amitriptyline  100 mg Oral QHS    aspirin  81 mg Oral Daily    atorvastatin  20 mg Oral QHS    cetirizine  10 mg Oral Daily    docusate sodium  100 mg Oral BID    enoxaparin  40 mg Subcutaneous Daily    levothyroxine  50 mcg Oral Before breakfast    montelukast  10 mg Oral QHS    multivitamin  1 tablet Oral Daily    pantoprazole  40 mg Oral QHS    pantoprazole  40 mg Oral Daily    sodium chloride 0.9%  3 mL Intravenous Q8H    topiramate  100 mg Oral Daily    topiramate  150 mg Oral QHS    vitamin D  1,000 Units Oral Daily     Continuous Infusions:  As Needed:  acetaminophen, albuterol-ipratropium, dextrose 50%, dextrose 50%, glucagon (human recombinant), glucose, glucose, insulin aspart U-100, loperamide, ondansetron, polyethylene glycol, promethazine    Active Hospital Problems    Diagnosis  POA    *Altered mental status [R41.82]  Unknown    Prediabetes [R73.03]  Yes     Chronic    Hypothyroidism due to Hashimoto's thyroiditis [E03.8, E06.3]  Yes     Chronic    HLD (hyperlipidemia) [E78.5]  Yes     Chronic    GERD (gastroesophageal reflux disease) [K21.9]  Yes     Chronic    Asthma [J45.909]  Yes     Chronic    Migraines [G43.909]  Yes     Chronic    MAURO (obstructive sleep apnea) [G47.33]  Yes      Resolved Hospital Problems   No resolved problems to display.       Overview  48 year old female with a h/o migraines, prediabetes on Metformin, GERD, asthma, HLD, hypothyroidism secondary to Hashimoto's, and MAURO who transferred from [facility] where she presented with L sided weakness, numbness, and "staring spells." Of note, patient's levothyroxine was discontinued in February and admitted for thyroid storm, after which levothyroxine was resumed with persisting mental status changes. Previous work-up at OSH reportedly included unremarkable MRI brain, CTH, EEG and LP.  " "    Assessment and Plan for Problems addressed today:    Transient alteration of awareness  · Unclear etiology at this time.  · Work-ups during previous hospitalizations, reportedly included EEG, CT head, MRI brain, and LP, all unremarkable. UA, urine tox screen at OSH negative.  · Fall precautions, seizure precautions, PT/OT ordered.  · Topiramate levels ordered.    · Vit B12/Folate & Vit D WNL. RPR, B1, B3, B6 pending.    · Consulted neurology: ordered EEG pending  · Brain/Cervical/Thoracic spine MRI (10/27): "Right frontal lobe developmental venous anomaly." Anomaly does not explain symptoms. Cervical/thoracic spine unremarkable.      Migraines  · Continued home amitriptyline 100mg qHS and topiramate 100mg daily and 150mg qHS.    Hypothyroidism due to Hashimoto's thyroiditis  · Continued home levothyroxine 50mcg.  · TSH, T4 wnl.     Prediabetes  · Patient has not been taking her Metformin as she is waiting on a new prescription to come in the mail.  · HA1c: 5.5%   · Low dose insulin correction ordered.     Asthma  MAURO (obstructive sleep apnea)  · CPAP qHS.  · Continued home Singulair & montelukast.     Hyperlipidemia  GERD (gastroesophageal reflux disease)  · Continued home Lipitor 20mg qHS & pantoprazole 40mg daily.     Right popliteal pain  Ultrasound for DVT      Diet: Diet diabetic [facility]; 1500 Calorie  DVT Prophylaxis: TEDs/SCDs  Anticoagulants   Medication Route Frequency    enoxaparin injection 40 mg Subcutaneous Daily       L/D/A:  PIV    Discharge plan and follow up  Home or Self Care      Provider  Elaina Dillon MD  [facility]OS[facility] MED D   Department of Hospital Medicine    Scribe Attestation: I personally scribed for Elaina Dillon MD on 10/28/2018 at 9:24 AM. Electronically signed by radha Macario on 10/28/2018 at 9:24 AM.    "

## 2018-10-29 NOTE — SIGNIFICANT EVENT
"  Rapid Response Nurse Note     Rapid Response Metrics:     Admit Date: 10/27/2018  LOS: 2  Code Status: Full Code   Date of Consult: 10/29/2018  : 1970  Age: 48 y.o.  Weight:   Wt Readings from Last 1 Encounters:   10/27/18 67 kg (147 lb 11.3 oz)     Sex: female  Race: White   Bed: [address] [place] [address] A:   MRN: [id number]  Time Rapid Response Team page Received: 914  Time Rapid Response Team at Bedside: 915  Time Rapid Response Team left Bedside: 937  Was the patient discharged from an ICU this admission?   no  Was the patient discharged from a PACU within last 24 hours?  no  Did the patient receive conscious sedation/general anesthesia within last 24 hours?  no  Was the patient in the ED within the past 24 hours?  no  Was the patient started on NIPPV within the past 24 hours?  no  Did this progress into an ARC or CPA:  no  Attending Physician: Elaina Dillon MD  Primary Service: [facility]  Consult Requested By: Elaina Dillon MD   Rapid Response Indication(s): Seizures    SITUATION:     Reason for Call:   Called to evaluate the patient for Neuro    BACKGROUND:     Why is the patient in the hospital?:  L sided weakness, numbness, and "staring spells"    What changed?: Patient on EEG, tech reports seizures, Patient not responding verbally, patient biting tongue.    ASSESSMENT:     What did you find: Patient unresponsive to verbal stimuli, biting tongue, EEG tech at beside, Wade charge nurse and bedside nurse at bedside, /73, SpO2 92%, BS 81[facility], epileptology contacted by [facility] team, orders received for ICU admission, awaiting bed assignment.    RECOMMENDATIONS:     We recommend: ICU upgrade    FOLLOW-UP/CONTINGENCY PLAN:     Patient needs a second visit at : NA    Call the rapid response Nurse at x [phone] for additional questions or concerns.      PHYSICIAN ESCALATION:     Orders received and case discussed with Dr. Herrera and Dr. Dillon     Disposition: Tx in ICU bed 32108.    "

## 2018-10-29 NOTE — PLAN OF CARE
Problem: Patient Care Overview  Goal: Plan of Care Review  Outcome: Ongoing (interventions implemented as appropriate)  Quiet and cooperative. EEG in progress. Fall and injury free. CPAP on . Complain of slight headache.  Tylenol 650 mg po given. Relief noted. Decrease blood pressure noted. Rechecked manually b/p 90/55, P 80.  Will continue to monitor. Bed low and locked. Side rails up x 2. Call bell in reach.

## 2018-10-29 NOTE — ASSESSMENT & PLAN NOTE
"49 y/o female presenting as a transfer from [facility] with unusual presentation of intermittent episodes over a span of 7 months involving multiple neurologic deficits (abnormal gait ranging from mild to severe/unable to walk, speech difficulty, "staring spells" closely described as a catatonic state?, syncope, decreased concentration/memory lapses). Patient has had an extensive work-up including imaging since her initial episode in February 2018; thus far, no abnormal findings have been reported. Differential diagnosis includes: epilepsy, recurrent seizures 2/2 underlying infectious process or metabolic derangement, neurodegenerative disorder, recurrent strokes. While always hesitant to claim as a diagnosis of exclusion, conversion disorder needs to be considered in this patient given her unusual presentation and inconsistent findings on physical exam, negative findings despite appropriate neurologic work-up so far, and her high-functioning baseline status.    Recommendations:  -- MRI brain and spine unremarkable.  -- vEEG pending   -- No need for repeat lumbar puncture at this time as patient had negative results in September  -- Fall precautions, given patient's severely unsteady gait  -- PT/OT  "

## 2018-10-29 NOTE — ASSESSMENT & PLAN NOTE
- Reports history of migraines since childhood  - Continue home Amitriptyline 100 mg qHS,  mg am/150 mg qHS

## 2018-10-29 NOTE — PLAN OF CARE
To room to attempt d/c planning assessment, Rapid Response in progress.  Will return at more suitable time.    Betina Ramirez RN  Case Management  [id number]

## 2018-10-29 NOTE — HPI
"Ms. Peterson is a 47 yo female with significant past medical history of migraines on TPM, HLD, hypothyroidism 2/2 Hashimoto's, asthma, prediabetes on metformin who presented as transfer from OSH for evaluation of spells.  at bedside assists in history. They report that events began in February 2018, and since that time she has had 6-8 episodes. In February when events began, patient had been abruptly taken off thyroid medication by endocrinologist. She presented in February with episode of left sided weakness, gait disturbance, tremors, "catatonic episodes". CT head/MRI negative for acute infarct at this time, patient reported to subsequently become aphasic and have "catatonic state" for approximately 45 minutes during which she was unresponsive, unable to follow commands. She reportedly slowly returned to baseline over the next several days. Symptoms reportedly thought to be due to thyroid storm. Since this initial hospitalization, she has had extensive workup including LP, multiple EEGs, with no abnormality noted.  describes the episodes as starting with a blank stare, followed by a period of unresponsiveness for 20-30 minutes and eyes dilated, then subsequently patient confused for 10-15 minutes prior to return to baseline. Patient had recent episodes 9/22 for which she was hospitalized at [facility] and discharged on increased Topamax (100 mg Am and 150 mg qHS). Most recent episode 10/26, for which she again presented to OSH and decision made to transfer patient to [facility] for further evaluation. Patient had additional episode 10/29 am for which rapid response called and patient subsequently transferred to ICU.   "

## 2018-10-29 NOTE — MEDICAL/APP STUDENT
Hospital Medicine  Progress note    Patient Name: Vivian Peterson  MRN: [id number]  Team: [facility] MED D Elaina Dillon MD  Admit Date: 10/27/2018  Code status: Full Code    Principal Problem:  Altered mental status    Interval hx: Patient reportedly actively seizing this AM; rapid response called. Epilepsy to review EEG to confirm epileptic activity. Tachycardic and tachypnic with O2 sats at 94%. ABG with hypoxemia, otherwise wnl.       ROS   Respiratory: no cough or shortness of breath  Cardiovascular: no chest pain or palpitations  Gastrointestinal: no nausea or vomiting, no abdominal pain or change in bowel habits  Behavioral/Psych: no depression or anxiety    PEx  Temp:  [96.4 °F (35.8 °C)-99.2 °F (37.3 °C)]   Pulse:  [66-95]   Resp:  [13-18]   BP: ()/(51-77)   SpO2:  [94 %-99 %]   No intake or output data in the 24 hours ending 10/29/18 0812    General Appearance: no acute distress   Heart: regular rate and rhythm  Respiratory: Normal respiratory effort, no crackles   Abdomen: Soft, non-tender; bowel sounds active  Skin: intact.   Neurologic:  No focal numbness or weakness  Mental status: Alert. Delayed responses.     Recent Labs   Lab 10/27/18  0630 10/28/18  0639 10/29/18  0619   WBC 5.87 5.28 4.25   HGB 13.8 13.5 13.7   HCT 41.8 41.4 41.6    193 172     Recent Labs   Lab 10/27/18  0630 10/28/18  0639 10/29/18  0619    140 140   K 3.8 3.7 3.7   * 111* 111*   CO2 19* 23 21*   BUN 15 19 17   CREATININE 0.8 1.0 0.9   GLU 90 90 87   CALCIUM 9.5 9.2 8.9   MG 2.2  --   --    PHOS 3.4  --   --      Recent Labs   Lab 10/27/18  0630 10/28/18  0639 10/29/18  0619   ALKPHOS 130 136* 127   ALT 21 22 20   AST 13 13 13   ALBUMIN 4.3 4.0 3.9   PROT 7.5 7.0 6.9   BILITOT 0.4 0.4 0.3      Recent Labs   Lab 10/27/18  1848 10/27/18  2035 10/28/18  0748 10/28/18  1303 10/28/18  1622 10/29/18  0757   POCTGLUCOSE 73 138* 87 118* 104 85       Scheduled Meds:   amitriptyline  100 mg Oral QHS    aspirin  81  "mg Oral Daily    atorvastatin  20 mg Oral QHS    cetirizine  10 mg Oral Daily    docusate sodium  100 mg Oral BID    enoxaparin  40 mg Subcutaneous Daily    levothyroxine  50 mcg Oral Before breakfast    montelukast  10 mg Oral QHS    multivitamin  1 tablet Oral Daily    pantoprazole  40 mg Oral QHS    pantoprazole  40 mg Oral Daily    sodium chloride 0.9%  3 mL Intravenous Q8H    topiramate  100 mg Oral Daily    topiramate  150 mg Oral QHS    vitamin D  1,000 Units Oral Daily     Continuous Infusions:  As Needed:  acetaminophen, albuterol-ipratropium, dextrose 50%, dextrose 50%, glucagon (human recombinant), glucose, glucose, insulin aspart U-100, loperamide, ondansetron, polyethylene glycol, promethazine    Active Hospital Problems    Diagnosis  POA    *Altered mental status [R41.82]  Unknown    Prediabetes [R73.03]  Yes     Chronic    Hypothyroidism due to Hashimoto's thyroiditis [E03.8, E06.3]  Yes     Chronic    HLD (hyperlipidemia) [E78.5]  Yes     Chronic    GERD (gastroesophageal reflux disease) [K21.9]  Yes     Chronic    Asthma [J45.909]  Yes     Chronic    Migraines [G43.909]  Yes     Chronic    MAURO (obstructive sleep apnea) [G47.33]  Yes      Resolved Hospital Problems   No resolved problems to display.       Overview  48 year old female with a h/o migraines, prediabetes on Metformin, GERD, asthma, HLD, hypothyroidism secondary to Hashimoto's, and MAURO who transferred from [facility] where she presented with L sided weakness, numbness, and "staring spells." Of note, patient's levothyroxine was discontinued in February and admitted for thyroid storm, after which levothyroxine was resumed with persisting mental status changes. Previous work-up at OSH reportedly included unremarkable MRI brain, CTH, EEG and LP. Consulted neurology; repeat Brain MRI with cervical/thoracic spine was unremarkable. EEG results pending.       Assessment and Plan for Problems addressed " "today:    Transient alteration of awareness  · Unclear etiology at this time.  · Work-ups during previous hospitalizations, reportedly included EEG, CT head, MRI brain, and LP, all unremarkable. UA, urine tox screen at OSH negative.  · Fall precautions, seizure precautions, PT/OT ordered.  · Topiramate levels ordered.    · Vit B12/Folate & Vit D WNL. RPR, B1, B3, B6 pending.    · Consulted neurology: EEG pending  · Brain/Cervical/Thoracic spine MRI (10/27): "Right frontal lobe developmental venous anomaly." Anomaly does not explain symptoms. Cervical/thoracic spine unremarkable.    · Patient reportedly actively seizing this AM; rapid response called. Epilepsy to review EEG to confirm epileptic activity. Tachycardic and tachypnic with O2 sats at 94%; ABG with hypoxemia, otherwise WNL. CTA chest with BLE U/S ordered to rule out VTE and started IVF. Plan to transfer to [facility]. (10/29)    Migraines  · Continued home amitriptyline 100mg qHS and topiramate 100mg daily and 150mg qHS.    Hypothyroidism due to Hashimoto's thyroiditis  · Continued home levothyroxine 50mcg.  · TSH, T4 wnl.     Prediabetes  · Patient has not been taking her Metformin as she is waiting on a new prescription to come in the mail.  · HA1c: 5.5%   · Low dose insulin correction ordered.     Asthma  MAURO (obstructive sleep apnea)  · CPAP qHS.  · Continued home Singulair & montelukast.     Hyperlipidemia  GERD (gastroesophageal reflux disease)  · Continued home Lipitor 20mg qHS & pantoprazole 40mg daily.     Right popliteal pain  Hypoxia   · Ultrasound for DVT pending. Anticoagulation started. Patient deferred BLE US due to EEG. (10/28)  · No evidence of DVT on US. (10/29)      Diet: Diet diabetic [facility]; 1500 Calorie  DVT Prophylaxis: TEDs/SCDs  Anticoagulants   Medication Route Frequency    enoxaparin injection 40 mg Subcutaneous Daily       L/D/A:  PIV    Discharge plan and follow up  Home or Self Care      Provider  Elaina Dillon MD  [facility] MED D   Department of Hospital Medicine    Scribopal Attestation: I personally scribed for Elaina Dillon MD on 10/29/2018 at 9:24 AM. Electronically signed by radha Macario on 10/29/2018 at 9:24 AM.

## 2018-10-30 LAB
ALBUMIN SERPL BCP-MCNC: 3.4 G/DL
ALP SERPL-CCNC: 108 U/L
ALT SERPL W/O P-5'-P-CCNC: 20 U/L
ANION GAP SERPL CALC-SCNC: 6 MMOL/L
AST SERPL-CCNC: 20 U/L
B2 GLYCOPROT1 IGA SER QL: <9 SAU
B2 GLYCOPROT1 IGG SER QL: <9 SGU
B2 GLYCOPROT1 IGM SER QL: 12 SMU
BASOPHILS # BLD AUTO: 0.02 K/UL
BASOPHILS NFR BLD: 0.4 %
BILIRUB SERPL-MCNC: 0.2 MG/DL
BUN SERPL-MCNC: 15 MG/DL
CALCIUM SERPL-MCNC: 8.4 MG/DL
CHLORIDE SERPL-SCNC: 117 MMOL/L
CO2 SERPL-SCNC: 17 MMOL/L
CREAT SERPL-MCNC: 0.8 MG/DL
DIFFERENTIAL METHOD: ABNORMAL
EOSINOPHIL # BLD AUTO: 0 K/UL
EOSINOPHIL NFR BLD: 0.8 %
ERYTHROCYTE [DISTWIDTH] IN BLOOD BY AUTOMATED COUNT: 12.9 %
EST. GFR  (AFRICAN AMERICAN): >60 ML/MIN/1.73 M^2
EST. GFR  (NON AFRICAN AMERICAN): >60 ML/MIN/1.73 M^2
GLUCOSE SERPL-MCNC: 91 MG/DL
HCT VFR BLD AUTO: 35.9 %
HGB BLD-MCNC: 11.4 G/DL
IMM GRANULOCYTES # BLD AUTO: 0.02 K/UL
IMM GRANULOCYTES NFR BLD AUTO: 0.4 %
LYMPHOCYTES # BLD AUTO: 1.8 K/UL
LYMPHOCYTES NFR BLD: 34.7 %
MAGNESIUM SERPL-MCNC: 2.5 MG/DL
MCH RBC QN AUTO: 29.5 PG
MCHC RBC AUTO-ENTMCNC: 31.8 G/DL
MCV RBC AUTO: 93 FL
MONOCYTES # BLD AUTO: 0.4 K/UL
MONOCYTES NFR BLD: 8.4 %
NEUTROPHILS # BLD AUTO: 2.8 K/UL
NEUTROPHILS NFR BLD: 55.3 %
NRBC BLD-RTO: 0 /100 WBC
PHOSPHATE SERPL-MCNC: 2.8 MG/DL
PLATELET # BLD AUTO: 151 K/UL
PMV BLD AUTO: 11.1 FL
POCT GLUCOSE: 111 MG/DL (ref 70–110)
POCT GLUCOSE: 67 MG/DL (ref 70–110)
POCT GLUCOSE: 70 MG/DL (ref 70–110)
POCT GLUCOSE: 79 MG/DL (ref 70–110)
POCT GLUCOSE: 92 MG/DL (ref 70–110)
POTASSIUM SERPL-SCNC: 4.3 MMOL/L
PROT SERPL-MCNC: 6.2 G/DL
PYRIDOXAL SERPL-MCNC: 10 UG/L (ref 5–50)
RBC # BLD AUTO: 3.86 M/UL
SODIUM SERPL-SCNC: 140 MMOL/L
TOPIRAMATE SERPL-MCNC: 12.4 MCG/ML
WBC # BLD AUTO: 5.1 K/UL

## 2018-10-30 PROCEDURE — 80053 COMPREHEN METABOLIC PANEL: CPT

## 2018-10-30 PROCEDURE — 95951 HC EEG MONITORING/VIDEO RECORD: CPT

## 2018-10-30 PROCEDURE — 94761 N-INVAS EAR/PLS OXIMETRY MLT: CPT

## 2018-10-30 PROCEDURE — 20000000 HC ICU ROOM

## 2018-10-30 PROCEDURE — 84100 ASSAY OF PHOSPHORUS: CPT

## 2018-10-30 PROCEDURE — 63600175 PHARM REV CODE 636 W HCPCS: Performed by: INTERNAL MEDICINE

## 2018-10-30 PROCEDURE — 85025 COMPLETE CBC W/AUTO DIFF WBC: CPT

## 2018-10-30 PROCEDURE — 95951 PR EEG MONITORING/VIDEORECORD: CPT | Mod: 26,,, | Performed by: PSYCHIATRY & NEUROLOGY

## 2018-10-30 PROCEDURE — 25000003 PHARM REV CODE 250: Performed by: PHYSICIAN ASSISTANT

## 2018-10-30 PROCEDURE — 99233 SBSQ HOSP IP/OBS HIGH 50: CPT | Mod: ,,, | Performed by: PSYCHIATRY & NEUROLOGY

## 2018-10-30 PROCEDURE — 83735 ASSAY OF MAGNESIUM: CPT

## 2018-10-30 PROCEDURE — A4216 STERILE WATER/SALINE, 10 ML: HCPCS | Performed by: PHYSICIAN ASSISTANT

## 2018-10-30 PROCEDURE — 99232 SBSQ HOSP IP/OBS MODERATE 35: CPT | Mod: ,,, | Performed by: NURSE PRACTITIONER

## 2018-10-30 RX ADMIN — MONTELUKAST SODIUM 10 MG: 10 TABLET, FILM COATED ORAL at 09:10

## 2018-10-30 RX ADMIN — THERA TABS 1 TABLET: TAB at 09:10

## 2018-10-30 RX ADMIN — TOPIRAMATE 100 MG: 100 TABLET, FILM COATED ORAL at 09:10

## 2018-10-30 RX ADMIN — VITAMIN D, TAB 1000IU (100/BT) 1000 UNITS: 25 TAB at 09:10

## 2018-10-30 RX ADMIN — ENOXAPARIN SODIUM 40 MG: 100 INJECTION SUBCUTANEOUS at 04:10

## 2018-10-30 RX ADMIN — Medication 3 ML: at 01:10

## 2018-10-30 RX ADMIN — CETIRIZINE HYDROCHLORIDE 10 MG: 5 TABLET ORAL at 09:10

## 2018-10-30 RX ADMIN — DOCUSATE SODIUM 100 MG: 100 CAPSULE, LIQUID FILLED ORAL at 09:10

## 2018-10-30 RX ADMIN — Medication 3 ML: at 09:10

## 2018-10-30 RX ADMIN — ATORVASTATIN CALCIUM 20 MG: 20 TABLET, FILM COATED ORAL at 09:10

## 2018-10-30 RX ADMIN — ASPIRIN 81 MG: 81 TABLET, COATED ORAL at 09:10

## 2018-10-30 RX ADMIN — LEVOTHYROXINE SODIUM 50 MCG: 50 TABLET ORAL at 05:10

## 2018-10-30 RX ADMIN — AMITRIPTYLINE HYDROCHLORIDE 100 MG: 25 TABLET, FILM COATED ORAL at 09:10

## 2018-10-30 RX ADMIN — PANTOPRAZOLE SODIUM 40 MG: 40 TABLET, DELAYED RELEASE ORAL at 09:10

## 2018-10-30 NOTE — PLAN OF CARE
"Hospital Medicine ICU Acceptance Note    Date of Admit: 10/27/2018  Date of Transfer / Stepdown: 10/30/2018  Bojolynns, C/J, L, Onc (IV chemo w/in 1 month), Gyn/Onc, or other special case?: no    ICU team stepping patient down: [facility]  ICU team member giving verbal handoff: aPige Kasper NP [id number]    Accepting  team: SANDRO GERMAIN     Brief History of Present Illness:      Vivian Peterson is a 47 y/o woman with migraine who presented to [facility] on 10/27 with acute encephalopathy concerning for seizures.    Hospital/ICU Course:     Patient was on the floor, had an episode of what appeared to be seizures. RRT was called, noted to be "staring spells and jerking movements" >30 mins. Transferred to [facility]. EEG, MRI negative for epileptiform activity or seizure etiology. Epilepsy is consulted and following.    On AM rounds, answering questions appropriately, oriented. No events overnight. On PM rounds. Remains neurologically stable, follows directions appropriately.       Consultants and Procedures:     Consultants:  Epilepsy    Procedures:    EEG 10/28 and 10/30  MRI Brain/C/T spine 10/27    Transfer Information:     Diet:  Diabetic    Physical Activity:  As tolerated  PT/OT ordered      To Do / Pending Studies / Follow ups:    Acute encephalopathy  - Epilepsy recommending holding topiramate  - cEEG overnight to capture any episodes      Patient has been accepted by Hospital Medicine Team JESSA, who will assume care of the patient upon arrival to the floor from the ICU. Please contact ICU team with any concerns prior to arrival. Please contact Hospital Medicine at [phone] or [phone] (please do NOT leave a voicemail) when patient arrives to the floor.    Rubi Aguila M.D., M.P.H.  Department of Hospital Medicine  [facility]  (pager) [phone]          "

## 2018-10-30 NOTE — HPI
Vivian Saini is a 49 y/o female with a h/o migraines (on Topamax), prediabetes on Metformin, GERD, asthma, HLD, hypothyroidism secondary to Hashimoto's, and MAURO who was admitted for hospital medicine service for evaluation of starring spells. 10/29 rapid response called for seizures / reported as starring spells with jerking movements of head and neck lasting > 30 mins and hence admitted to [facility].     On further review, extensive work-up over the past few months for this same presentation including imaging with CT Head, Brain MRI, multiple EEGs, and LP. No clear etiology yet. Upon arrival to [facility], patient is hemodynamically stable. Lab work in-patient has been grossly unremarkable. UA, urine toxicology screen at OSH wnl. TSH, Vit B12, and folate WNL. RPR non-reactive. Vitamin B1, B3, B6 levels all pending.

## 2018-10-30 NOTE — SUBJECTIVE & OBJECTIVE
Interval History: Alert oriented No episode of seizure activity overnight. Follows command with R side weakness noted to upper and lower extremities.     Review of Systems   Constitutional: Positive for activity change and fatigue.   Eyes: Negative for visual disturbance.   Respiratory: Negative for shortness of breath.    Cardiovascular: Negative for chest pain.   Neurological: Negative for dizziness, speech difficulty and light-headedness.       Objective:     Vitals:  Temp: 97.5 °F (36.4 °C)  Pulse: 85  BP: (!) 103/56  MAP (mmHg): 75  Resp: 14  SpO2: 98 %  O2 Device (Oxygen Therapy): room air    Temp  Min: 97.5 °F (36.4 °C)  Max: 98.4 °F (36.9 °C)  Pulse  Min: 68  Max: 102  BP  Min: 82/45  Max: 118/71  MAP (mmHg)  Min: 61  Max: 88  Resp  Min: 14  Max: 33  SpO2  Min: 93 %  Max: 100 %  Oxygen Concentration (%)  Min: 30  Max: 30    10/29 0701 - 10/30 0700  In: 1725 [P.O.:160; I.V.:1565]  Out: 1550 [Urine:1550]   Unmeasured Output  Urine Occurrence: 1  Stool Occurrence: 1       Physical Exam   Constitutional: She is oriented to person, place, and time. She appears well-developed and well-nourished.   HENT:   Head: Normocephalic and atraumatic.   Nose: Nose normal.   Eyes: Conjunctivae and EOM are normal. Pupils are equal, round, and reactive to light.   Neck: Normal range of motion. No JVD present.   Cardiovascular: Normal rate and regular rhythm.   Pulmonary/Chest: Effort normal.   Abdominal: She exhibits no distension.   Musculoskeletal: Normal range of motion.   Neurological: She is alert and oriented to person, place, and time.   Alert Oriented GCS 15. Responds appropriately to questions. Pupils equal reactive without c/o visual disturbances. Moves all extremities with weakness noted to R side. Negative drift. Denies any episode of seizure like or starring activity overnight.   Skin: Skin is warm.   Nursing note and vitals reviewed.    Unable to test coordination, gait due to level of  consciousness.    Medications:  Continuous Scheduled  amitriptyline 100 mg QHS   aspirin 81 mg Daily   atorvastatin 20 mg QHS   cetirizine 10 mg Daily   docusate sodium 100 mg BID   enoxaparin 40 mg Daily   levothyroxine 50 mcg Before breakfast   montelukast 10 mg QHS   multivitamin 1 tablet Daily   pantoprazole 40 mg Daily   sodium chloride 0.9% 3 mL Q8H   topiramate 100 mg Daily   topiramate 150 mg QHS   vitamin D 1,000 Units Daily   PRN  acetaminophen 650 mg Q4H PRN   albuterol-ipratropium 3 mL Q4H PRN   dextrose 50% 12.5 g PRN   dextrose 50% 25 g PRN   glucagon (human recombinant) 1 mg PRN   glucose 16 g PRN   glucose 24 g PRN   insulin aspart U-100 0-5 Units QID (AC + HS) PRN   loperamide 2 mg PRN   magnesium oxide 800 mg PRN   magnesium oxide 800 mg PRN   ondansetron 8 mg Q8H PRN   polyethylene glycol 17 g TID PRN   potassium chloride 10% 40 mEq PRN   potassium chloride 10% 40 mEq PRN   potassium chloride 10% 60 mEq PRN   potassium, sodium phosphates 2 packet PRN   potassium, sodium phosphates 2 packet PRN   potassium, sodium phosphates 2 packet PRN   promethazine 25 mg Q6H PRN     Today I personally reviewed pertinent medications, imaging, laboratory results, notably:    Diet  [facility]; 1500 Calorie  Diet diabetic [facility]; 1500 Calorie

## 2018-10-30 NOTE — ASSESSMENT & PLAN NOTE
- 49 y/o female with a h/o migraines (on Topamax), prediabetes on Metformin, GERD, asthma, HLD, hypothyroidism secondary to Hashimoto's, and MAURO who was admitted for evaluation of starring spells.   - 10/29 rapid response called for seizures / reported as starring spells with jerking movements of head and neck lasting > 30 mins and hence admitted to [facility].      - Prior CT Head, Brain MRI, multiple EEGs, and LP negative   - NUA, urine toxicology screen at OSH wnl. TSH, Vit B12, and folate WNL. RPR non-reactive. Vitamin B1, B3, B6 levels all pending.     Plans   - 24 NCC monitoring with continuous EEG monitoring / capturing and characterizing the events.  - epilepsy following, appreciate recs   - control infection / control lytes / avoid acidosis, hypoxia

## 2018-10-30 NOTE — PROGRESS NOTES
"[facility]  Neurocritical Care  Progress Note    Admit Date: 10/27/2018  Service Date: 10/30/2018  Length of Stay: 3    Subjective:     Chief Complaint: Altered mental status    History of Present Illness: Vivian Saini is a 49 y/o female with a h/o migraines (on Topamax), prediabetes on Metformin, GERD, asthma, HLD, hypothyroidism secondary to Hashimoto's, and MAURO who was admitted for hospital medicine service for evaluation of starring spells. 10/29 rapid response called for seizures / reported as starring spells with jerking movements of head and neck lasting > 30 mins and hence admitted to [facility].     On further review, extensive work-up over the past few months for this same presentation including imaging with CT Head, Brain MRI, multiple EEGs, and LP. No clear etiology yet. Upon arrival to [facility], patient is hemodynamically stable. Lab work in-patient has been grossly unremarkable. UA, urine toxicology screen at OSH wnl. TSH, Vit B12, and folate WNL. RPR non-reactive. Vitamin B1, B3, B6 levels all pending.        Hospital Course: 10/27 Consulted neurology, ordered EEG. Requesting MRI of head and Cand T spine - will need to clarify with or without contrast  10/27 Brain/Cervical/Thoracic spine MRI (10/27): "Right frontal lobe developmental venous anomaly." Anomaly does not explain symptoms. Cervical/thoracic spine unremarkable  10/29 rapid response called for seizures / reported as starring spells with jerking movements of head and neck lasting > 30 mins and hence admitted to [facility]. EEG monitoring in place   10/30 No events overnight. Stable for transfer to Hospital Medicine      No new subjective & objective note has been filed under this hospital service since the last note was generated.    Assessment/Plan:     No new Assessment & Plan notes have been filed under this hospital service since the last note was generated.  Service: Neuro Critical Care        The patient is being Prophylaxed " for:  Venous Thromboembolism with: Chemical  Stress Ulcer with: PPI  Ventilator Pneumonia with: not applicable    Activity Orders          Activity as tolerated starting at 10/30 1448        Full Code    Jennifer Kasper NP  Neurocritical Care  [facility]

## 2018-10-30 NOTE — PROGRESS NOTES
"[facility]  Neurocritical Care  Progress Note    Admit Date: 10/27/2018  Service Date: 10/30/2018  Length of Stay: 3    Subjective:     Chief Complaint: Altered mental status    History of Present Illness: Vivian Saini is a 49 y/o female with a h/o migraines (on Topamax), prediabetes on Metformin, GERD, asthma, HLD, hypothyroidism secondary to Hashimoto's, and MAURO who was admitted for hospital medicine service for evaluation of starring spells. 10/29 rapid response called for seizures / reported as starring spells with jerking movements of head and neck lasting > 30 mins and hence admitted to [facility].     On further review, extensive work-up over the past few months for this same presentation including imaging with CT Head, Brain MRI, multiple EEGs, and LP. No clear etiology yet. Upon arrival to [facility] ER, patient is hemodynamically stable. Lab work in-patient has been grossly unremarkable. UA, urine toxicology screen at OSH wnl. TSH, Vit B12, and folate WNL. RPR non-reactive. Vitamin B1, B3, B6 levels all pending.        Hospital Course: 10/27 Consulted neurology, ordered EEG. Requesting MRI of head and Cand T spine - will need to clarify with or without contrast  10/27 Brain/Cervical/Thoracic spine MRI (10/27): "Right frontal lobe developmental venous anomaly." Anomaly does not explain symptoms. Cervical/thoracic spine unremarkable  10/29 rapid response called for seizures / reported as starring spells with jerking movements of head and neck lasting > 30 mins and hence admitted to [facility]. EEG monitoring in place   10/30 No events overnight. Stable for transfer to Hospital Medicine      Interval History: Alert oriented No episode of seizure activity overnight. Follows command with R side weakness noted to upper and lower extremities.     Review of Systems   Constitutional: Positive for activity change and fatigue.   Eyes: Negative for visual disturbance.   Respiratory: Negative for shortness of " breath.    Cardiovascular: Negative for chest pain.   Neurological: Negative for dizziness, speech difficulty and light-headedness.       Objective:     Vitals:  Temp: 97.5 °F (36.4 °C)  Pulse: 85  BP: (!) 103/56  MAP (mmHg): 75  Resp: 14  SpO2: 98 %  O2 Device (Oxygen Therapy): room air    Temp  Min: 97.5 °F (36.4 °C)  Max: 98.4 °F (36.9 °C)  Pulse  Min: 68  Max: 102  BP  Min: 82/45  Max: 118/71  MAP (mmHg)  Min: 61  Max: 88  Resp  Min: 14  Max: 33  SpO2  Min: 93 %  Max: 100 %  Oxygen Concentration (%)  Min: 30  Max: 30    10/29 0701 - 10/30 0700  In: 1725 [P.O.:160; I.V.:1565]  Out: 1550 [Urine:1550]   Unmeasured Output  Urine Occurrence: 1  Stool Occurrence: 1       Physical Exam   Constitutional: She is oriented to person, place, and time. She appears well-developed and well-nourished.   HENT:   Head: Normocephalic and atraumatic.   Nose: Nose normal.   Eyes: Conjunctivae and EOM are normal. Pupils are equal, round, and reactive to light.   Neck: Normal range of motion. No JVD present.   Cardiovascular: Normal rate and regular rhythm.   Pulmonary/Chest: Effort normal.   Abdominal: She exhibits no distension.   Musculoskeletal: Normal range of motion.   Neurological: She is alert and oriented to person, place, and time.   Alert Oriented GCS 15. Responds appropriately to questions. Pupils equal reactive without c/o visual disturbances. Moves all extremities with weakness noted to R side. Negative drift. Denies any episode of seizure like or starring activity overnight.   Skin: Skin is warm.   Nursing note and vitals reviewed.    Unable to test coordination, gait due to level of consciousness.    Medications:  Continuous Scheduled  amitriptyline 100 mg QHS   aspirin 81 mg Daily   atorvastatin 20 mg QHS   cetirizine 10 mg Daily   docusate sodium 100 mg BID   enoxaparin 40 mg Daily   levothyroxine 50 mcg Before breakfast   montelukast 10 mg QHS   multivitamin 1 tablet Daily   pantoprazole 40 mg Daily   sodium  chloride 0.9% 3 mL Q8H   topiramate 100 mg Daily   topiramate 150 mg QHS   vitamin D 1,000 Units Daily   PRN  acetaminophen 650 mg Q4H PRN   albuterol-ipratropium 3 mL Q4H PRN   dextrose 50% 12.5 g PRN   dextrose 50% 25 g PRN   glucagon (human recombinant) 1 mg PRN   glucose 16 g PRN   glucose 24 g PRN   insulin aspart U-100 0-5 Units QID (AC + HS) PRN   loperamide 2 mg PRN   magnesium oxide 800 mg PRN   magnesium oxide 800 mg PRN   ondansetron 8 mg Q8H PRN   polyethylene glycol 17 g TID PRN   potassium chloride 10% 40 mEq PRN   potassium chloride 10% 40 mEq PRN   potassium chloride 10% 60 mEq PRN   potassium, sodium phosphates 2 packet PRN   potassium, sodium phosphates 2 packet PRN   potassium, sodium phosphates 2 packet PRN   promethazine 25 mg Q6H PRN     Today I personally reviewed pertinent medications, imaging, laboratory results, notably:    Diet  Diet diabetic [facility]; 1500 Calorie  Diet diabetic [facility]; 1500 Calorie        Assessment/Plan:     Neuro   * Altered mental status    Transferred to [facility] for observation and continuous EEG monitoring. No events overnight. 10/29  10/30 Consult with epilepsy and patient appropriate to transfer to Hospital Medicine to remain on continuous EEG monitoring.      Migraines    Continue topamax   Continue amitriptyline     Pulmonary   Asthma    - prn duo nebs   Continue nightly montelukast     Cardiac/Vascular   HLD (hyperlipidemia)    - statins      Endocrine   Hypothyroidism due to Hashimoto's thyroiditis    - continue home meds      Prediabetes    Hbg A1c  - SSI   Continue glucose monitoring     GI   GERD (gastroesophageal reflux disease)    - continue PPI      Other   MAURO (obstructive sleep apnea)    - cpap qhs ordered.  Pt home CPAP is nasal and hospital mask may be uncomfortable therefore patient uses prn as needed.  Upon evaluation, Denies SOB during rest           The patient is being Prophylaxed for:  Venous Thromboembolism with: Chemical  Stress  Ulcer with: PPI  Ventilator Pneumonia with: not applicable    Activity Orders          Activity as tolerated starting at 10/30 1448        Full Code    Jennifer Kasper NP  Neurocritical Care  [facility]

## 2018-10-30 NOTE — PROGRESS NOTES
[facility]  Neurology-Epilepsy  Progress Note    Patient Name: Vivian Peterson  MRN: [id number]  Admission Date: 10/27/2018  Hospital Length of Stay: 3 days  Code Status: Full Code   Attending Provider: Jensen Herrera MD  Primary Care Physician: Provider Notinsystem   Principal Problem:Altered mental status    Subjective:     Hospital Course:   10/27: Transfer to [facility] from OSH  10/28: no acute events  10/29: Episode am, transferred to [facility] team. No noted epileptiform activity during end of episode (eeg not running for initial part of episode)  10/30: No further episodes since transfer to ICU, planning for stepdown to floor for further workup. Recommend holding Topamax.    Interval History: No events since transfer to ICU. Planning for stepdown to floor today. Will continue EEG monitoring.    Current Facility-Administered Medications   Medication Dose Route Frequency Provider Last Rate Last Dose    acetaminophen tablet 650 mg  650 mg Oral Q4H PRN Ines Gillette PA-C   650 mg at 10/28/18 2154    albuterol-ipratropium 2.5 mg-0.5 mg/3 mL nebulizer solution 3 mL  3 mL Nebulization Q4H PRN Ines Gillette PA-C        amitriptyline tablet 100 mg  100 mg Oral QHS Ines Gillette PA-C   100 mg at 10/29/18 2029    aspirin EC tablet 81 mg  81 mg Oral Daily NANCY YuanC   81 mg at 10/30/18 0944    atorvastatin tablet 20 mg  20 mg Oral QHS NANCY YuanC   20 mg at 10/29/18 2029    cetirizine tablet 10 mg  10 mg Oral Daily Ines Gillette PA-C   10 mg at 10/30/18 0944    dextrose 50% injection 12.5 g  12.5 g Intravenous PRN Ines Gillette PA-C        dextrose 50% injection 25 g  25 g Intravenous PRN Ines Gillette PA-C        docusate sodium capsule 100 mg  100 mg Oral BID Ines Gillette PA-C   100 mg at 10/30/18 0945    enoxaparin injection 40 mg  40 mg Subcutaneous Daily Elaina Dillon MD   40 mg at 10/29/18 1733    glucagon (human  recombinant) injection 1 mg  1 mg Intramuscular PRN Ines Gillette PA-C        glucose chewable tablet 16 g  16 g Oral PRN Ines Gillette PA-C        glucose chewable tablet 24 g  24 g Oral PRN Ines Gillette PA-C        insulin aspart U-100 pen 0-5 Units  0-5 Units Subcutaneous QID (AC + HS) PRN Ines Gillette PA-C        levothyroxine tablet 50 mcg  50 mcg Oral Before breakfast Ines Gillette PA-C   50 mcg at 10/30/18 0505    loperamide capsule 2 mg  2 mg Oral PRN Ines Gillette PA-C        magnesium oxide tablet 800 mg  800 mg Oral PRN Keturah Perdomo MD        magnesium oxide tablet 800 mg  800 mg Oral PRN Keturah Perdomo MD        montelukast tablet 10 mg  10 mg Oral QHS Ines Gillette PA-C   10 mg at 10/29/18 2029    multivitamin tablet 1 tablet  1 tablet Oral Daily Ines Gillette PA-C   1 tablet at 10/30/18 0944    ondansetron disintegrating tablet 8 mg  8 mg Oral Q8H PRN Ines Gillette PA-C        pantoprazole EC tablet 40 mg  40 mg Oral Daily Ines Gillette PA-C   40 mg at 10/30/18 0900    polyethylene glycol packet 17 g  17 g Oral TID PRN Ines Gillette PA-C        potassium chloride 10% oral solution 40 mEq  40 mEq Oral PRN Keturah Perdomo MD        potassium chloride 10% oral solution 40 mEq  40 mEq Oral PRN Keturah Perdomo MD        potassium chloride 10% oral solution 60 mEq  60 mEq Oral PRN Keturah Perdomo MD        potassium, sodium phosphates 280-160-250 mg packet 2 packet  2 packet Oral PRN Keturah Perdomo MD        potassium, sodium phosphates 280-160-250 mg packet 2 packet  2 packet Oral PRN Keturah Perdomo MD        potassium, sodium phosphates 280-160-250 mg packet 2 packet  2 packet Oral PRN Keturha Perdomo MD        promethazine tablet 25 mg  25 mg Oral Q6H PRN Ines Gillette PA-C        sodium chloride 0.9% flush 3 mL  3 mL Intravenous Q8H Ines Gillette,  VANDANA   3 mL at 10/30/18 1336    topiramate tablet 100 mg  100 mg Oral Daily Ines SAMI VANDANA Gillette   100 mg at 10/30/18 0944    topiramate tablet 150 mg  150 mg Oral QHS Ines Reyesadilene VANDANA   150 mg at 10/29/18 2030    vitamin D 1000 units tablet 1,000 Units  1,000 Units Oral Daily Ines SAMI ReyesVANDANA head   1,000 Units at 10/30/18 0900     Continuous Infusions:    Review of Systems   Constitutional: Negative for chills, fatigue and fever.   Respiratory: Negative for cough and shortness of breath.    Cardiovascular: Negative for chest pain and leg swelling.   Gastrointestinal: Negative for nausea and vomiting.   Musculoskeletal: Positive for gait problem. Negative for back pain and myalgias.   Skin: Negative for pallor, rash and wound.   Neurological: Positive for speech difficulty (intermittent), numbness (BLE reported) and headaches. Negative for dizziness, tremors and facial asymmetry.   Psychiatric/Behavioral: Negative for agitation and confusion. The patient is not hyperactive.      Objective:     Vital Signs (Most Recent):  Temp: 98.2 °F (36.8 °C) (10/30/18 1500)  Pulse: 88 (10/30/18 1500)  Resp: (!) 25 (10/30/18 1500)  BP: 103/60 (10/30/18 1500)  SpO2: 100 % (10/30/18 1500) Vital Signs (24h Range):  Temp:  [97.5 °F (36.4 °C)-98.2 °F (36.8 °C)] 98.2 °F (36.8 °C)  Pulse:  [] 88  Resp:  [14-33] 25  SpO2:  [93 %-100 %] 100 %  BP: ()/(52-77) 103/60     Weight: 67 kg (147 lb 11.3 oz)  Body mass index is 28.85 kg/m².    Physical Exam   Constitutional: She is oriented to person, place, and time. She appears well-developed and well-nourished. No distress.   HENT:   Head: Normocephalic and atraumatic.   Right Ear: External ear normal.   Left Ear: External ear normal.   Nose: Nose normal.   Eyes: Conjunctivae and EOM are normal. Pupils are equal, round, and reactive to light.   Neck: Normal range of motion. No thyromegaly present.   Cardiovascular: Intact distal pulses.   Pulmonary/Chest: Effort  normal. No respiratory distress.   Musculoskeletal: Normal range of motion. She exhibits no deformity.   Neurological: She is oriented to person, place, and time.   Skin: Skin is warm and dry. She is not diaphoretic. No erythema.   Psychiatric: She is slowed (intermittent).       NEUROLOGICAL EXAMINATION:     MENTAL STATUS   Oriented to person, place, and time.   Level of consciousness: alert       Oriented to person, , place, year, president. Slow to answer questions at times although speech generally fluent.     CRANIAL NERVES     CN III, IV, VI   Pupils are equal, round, and reactive to light.  Extraocular motions are normal.     CN V   Facial sensation intact.     CN VII   Facial expression full, symmetric.     CN VIII   CN VIII normal.     CN IX, X   CN IX normal.   CN X normal.     CN XI   Right sternocleidomastoid strength: weak  Left sternocleidomastoid strength: weak    CN XII   CN XII normal.     MOTOR EXAM        BUE 4/5 strength  Decreased BLE strength, ? Full effort       Significant Labs: All pertinent lab results from the past 24 hours have been reviewed.    Significant Studies: I have reviewed all pertinent imaging results/findings within the past 24 hours.    Assessment and Plan:     * Altered mental status    47 yo female who presented as transfer from OSH for further workup of spells of transient neurologic symptoms. Extensive workup since onset with no noteable abnormal findings. Onset of episodes coincided with abrupt discontinuation of thyroid medication, but episodes have continued since it was resumed.    Recommendations:  - Episode not fully capture on EEG, but portion that was with no noteable epileptiform correlate  - Continue vEEG  - Recommend holding home Topiramate in an effort to capture additional even on EEG, no indication to start additional AED at this point  - Continued workup of other possible etiologies of events per primary team    Plan of care discussed with primary  service, patient and  at bedside. Will continue to follow.      MAURO (obstructive sleep apnea)    - CPAP qHS     Migraines    - Reports history of migraines since childhood  - Continue home Amitriptyline 100 mg qHS, hold home TPM      Asthma    - On home singulair, montelukast, CPAP qHS     GERD (gastroesophageal reflux disease)    - Home pantoprazole      HLD (hyperlipidemia)    - Home Lipitor     Hypothyroidism due to Hashimoto's thyroiditis    - Home Levothyroxine 50 mcg daily  - Abrupt cessation of thyroid medication in February noted to coincide with onset of events.   - Further workup of thyroid dysfunction as possible contributing factor per primary     Prediabetes    - SSI coverage per primary         VTE Risk Mitigation (From admission, onward)        Ordered     enoxaparin injection 40 mg  Daily      10/28/18 1512     Place sequential compression device  Until discontinued      10/27/18 0515     Place HERBIE hose  Until discontinued      10/27/18 0515     IP VTE HIGH RISK PATIENT  Once      10/27/18 0515          María Moeller PA-C  Neurology-Epilepsy  [facility]  Staff: Dr. Sommer

## 2018-10-30 NOTE — ASSESSMENT & PLAN NOTE
Transferred to [facility] for observation and continuous EEG monitoring. No events overnight. 10/29  10/30 Consult with epilepsy and patient appropriate to transfer to Hospital Medicine to remain on continuous EEG monitoring.

## 2018-10-30 NOTE — PLAN OF CARE
10/30/18 1628   Discharge Assessment   Assessment Type Discharge Planning Assessment   Confirmed/corrected address and phone number on facesheet? Yes   Assessment information obtained from? Patient;Caregiver   Expected Length of Stay (days) 3   Communicated expected length of stay with patient/caregiver yes   Prior to hospitilization cognitive status: Alert/Oriented   Prior to hospitalization functional status: Independent   Current cognitive status: Alert/Oriented   Current Functional Status: Independent   Lives With spouse   Able to Return to Prior Arrangements yes   Is patient able to care for self after discharge? Yes   Who are your caregiver(s) and their phone number(s)? Wagner Peterson () [phone]   Patient's perception of discharge disposition home or selfcare   Readmission Within The Last 30 Days no previous admission in last 30 days   Patient currently being followed by outpatient case management? No   Patient currently receives any other outside agency services? No   Equipment Currently Used at Home none   Do you have any problems affording any of your prescribed medications? No   Is the patient taking medications as prescribed? yes   Does the patient have transportation home? Yes   Transportation Available family or friend will provide   Does the patient receive services at the Coumadin Clinic? No   Discharge Plan A Home with family   Discharge Plan B Home with family;Home Health   Patient/Family In Agreement With Plan yes         Discharge/ My Health Packet Folder Given to patient/family:      yes      PCP:    Andrez Valencia MD  [address] [facility], [place], [place], [phone]        Pharmacy:    [facility] [place], [place] [address]  [address] [facility]  [place] [address]  Phone: [phone] Fax: [phone]    [facility] [place] [place], [place] - [address] [facility]  [address] [facility] [address]  Phone: [phone] Fax:  [phone]    [facility]  [address]  [facility] [address]  Phone: [phone] Fax: [phone]    Patient would like bedside delivery upon discharge.         Emergency Contacts:  Extended Emergency Contact Information  Primary Emergency Contact: Wagner Peterson  Address: [address] [place] [address] [id number] [place] [address] of [facility]  Home Phone: [phone]  Mobile Phone: [phone]  Relation: Spouse  Preferred language: English  Secondary Emergency Contact: Jordy Stevens  Mobile Phone: [phone]  Relation: Father  Preferred language: English   needed? No      Insurance:  Payor:  / Plan:  [facility] / Product Type: [facility] /       Mary Gonzalez RN, CCRN-K, [facility]  Neuro-Critical Care   X [phone]

## 2018-10-30 NOTE — PLAN OF CARE
Patient AAO*4. VSS. SPO2> 93% room air. Patient follows commands and opens eyes spontaneously. Continous EEG monitored. CTA of chest cancelled by NCC. Voids per bedpan. Adequate appetite with >50% intake. No new skin breakdown noted. Patient and spouse updated on plan of care. Questions answered. Neuro team at bedside. See flowsheet for details. Call light in reach. Safety precautions maintained.

## 2018-10-30 NOTE — HOSPITAL COURSE
"10/27 Consulted neurology, ordered EEG. Requesting MRI of head and Cand T spine - will need to clarify with or without contrast  10/27 Brain/Cervical/Thoracic spine MRI (10/27): "Right frontal lobe developmental venous anomaly." Anomaly does not explain symptoms. Cervical/thoracic spine unremarkable  10/29 rapid response called for seizures / reported as starring spells with jerking movements of head and neck lasting > 30 mins and hence admitted to [facility]. EEG monitoring in place   10/30 No events overnight. Stable for transfer to Hospital Medicine    "

## 2018-10-30 NOTE — ASSESSMENT & PLAN NOTE
- Reports history of migraines since childhood  - Continue home Amitriptyline 100 mg qHS, hold home TPM

## 2018-10-30 NOTE — PLAN OF CARE
Problem: Spiritual Distress, Risk/Actual (Adult,Obstetrics,Pediatric)  Intervention: Facilitate Personal Exploration/Expression of Spirituality  F -  and Belief: Quaker (non-Buddhism).     I - Importance: Yes    C - Community: Pt mentioned family support (, children, grandchildren), and mentioned her grandchildren give her more reason to get healthy.     A - Address in Care: Introduced and offered pastoral support with reflective listening and spiritual assessment and prayer upon request. Pt made aware of 's presence as needed.

## 2018-10-30 NOTE — PLAN OF CARE
"Problem: Patient Care Overview  Goal: Plan of Care Review  PMH: DM, GERD,asthma, HLD, MAURO, hypothyroidism, migraines, thyroid storm    10/27: ED with AMS, abnormal gait, "seziure like" episodes   10/29: Core call to SICU for seizure like episode, continuous EEG       Outcome: Ongoing (interventions implemented as appropriate)  POC reviewed with pt and patient's spouse at 0200. Pt and spouse verbalized understanding. Questions and concerns addressed. No acute events overnight. CTA of chest postponed until tomorrow per NCC. Pt progressing toward goals. Will continue to monitor. See flowsheets for full assessment and VS info       "

## 2018-10-30 NOTE — PROGRESS NOTES
"[facility]  Neurocritical Care  Progress Note    Admit Date: 10/27/2018  Service Date: 10/30/2018  Length of Stay: 3    Subjective:     Chief Complaint: Altered mental status    History of Present Illness: Vivian Saini is a 47 y/o female with a h/o migraines (on Topamax), prediabetes on Metformin, GERD, asthma, HLD, hypothyroidism secondary to Hashimoto's, and MAURO who was admitted for hospital medicine service for evaluation of starring spells. 10/29 rapid response called for seizures / reported as starring spells with jerking movements of head and neck lasting > 30 mins and hence admitted to [facility].     On further review, extensive work-up over the past few months for this same presentation including imaging with CT Head, Brain MRI, multiple EEGs, and LP. No clear etiology yet. Upon arrival to [facility], patient is hemodynamically stable. Lab work in-patient has been grossly unremarkable. UA, urine toxicology screen at OSH wnl. TSH, Vit B12, and folate WNL. RPR non-reactive. Vitamin B1, B3, B6 levels all pending.        Hospital Course: 10/27 Consulted neurology, ordered EEG. Requesting MRI of head and Cand T spine - will need to clarify with or without contrast  10/27 Brain/Cervical/Thoracic spine MRI (10/27): "Right frontal lobe developmental venous anomaly." Anomaly does not explain symptoms. Cervical/thoracic spine unremarkable  10/29 rapid response called for seizures / reported as starring spells with jerking movements of head and neck lasting > 30 mins and hence admitted to [facility]. EEG monitoring in place   10/30 No events overnight. Stable for transfer to Hospital Medicine      Interval History: Alert oriented No episode of seizure activity overnight. Follows command with R side weakness noted to upper and lower extremities.     Review of Systems   Constitutional: Positive for activity change and fatigue.   Eyes: Negative for visual disturbance.   Respiratory: Negative for shortness of " breath.    Cardiovascular: Negative for chest pain.   Neurological: Negative for dizziness, speech difficulty and light-headedness.       Objective:     Vitals:  Temp: 97.5 °F (36.4 °C)  Pulse: 85  BP: (!) 103/56  MAP (mmHg): 75  Resp: 14  SpO2: 98 %  O2 Device (Oxygen Therapy): room air    Temp  Min: 97.5 °F (36.4 °C)  Max: 98.4 °F (36.9 °C)  Pulse  Min: 68  Max: 102  BP  Min: 82/45  Max: 118/71  MAP (mmHg)  Min: 61  Max: 88  Resp  Min: 14  Max: 33  SpO2  Min: 93 %  Max: 100 %  Oxygen Concentration (%)  Min: 30  Max: 30    10/29 0701 - 10/30 0700  In: 1725 [P.O.:160; I.V.:1565]  Out: 1550 [Urine:1550]   Unmeasured Output  Urine Occurrence: 1  Stool Occurrence: 1       Physical Exam   Constitutional: She is oriented to person, place, and time. She appears well-developed and well-nourished.   HENT:   Head: Normocephalic and atraumatic.   Nose: Nose normal.   Eyes: Conjunctivae and EOM are normal. Pupils are equal, round, and reactive to light.   Neck: Normal range of motion. No JVD present.   Cardiovascular: Normal rate and regular rhythm.   Pulmonary/Chest: Effort normal.   Abdominal: She exhibits no distension.   Musculoskeletal: Normal range of motion.   Neurological: She is alert and oriented to person, place, and time.   Alert Oriented GCS 15. Responds appropriately to questions. Pupils equal reactive without c/o visual disturbances. Moves all extremities with weakness noted to R side. Negative drift. Denies any episode of seizure like or starring activity overnight.   Skin: Skin is warm.   Nursing note and vitals reviewed.    Unable to test coordination, gait due to level of consciousness.    Medications:  Continuous Scheduled  amitriptyline 100 mg QHS   aspirin 81 mg Daily   atorvastatin 20 mg QHS   cetirizine 10 mg Daily   docusate sodium 100 mg BID   enoxaparin 40 mg Daily   levothyroxine 50 mcg Before breakfast   montelukast 10 mg QHS   multivitamin 1 tablet Daily   pantoprazole 40 mg Daily   sodium  chloride 0.9% 3 mL Q8H   topiramate 100 mg Daily   topiramate 150 mg QHS   vitamin D 1,000 Units Daily   PRN  acetaminophen 650 mg Q4H PRN   albuterol-ipratropium 3 mL Q4H PRN   dextrose 50% 12.5 g PRN   dextrose 50% 25 g PRN   glucagon (human recombinant) 1 mg PRN   glucose 16 g PRN   glucose 24 g PRN   insulin aspart U-100 0-5 Units QID (AC + HS) PRN   loperamide 2 mg PRN   magnesium oxide 800 mg PRN   magnesium oxide 800 mg PRN   ondansetron 8 mg Q8H PRN   polyethylene glycol 17 g TID PRN   potassium chloride 10% 40 mEq PRN   potassium chloride 10% 40 mEq PRN   potassium chloride 10% 60 mEq PRN   potassium, sodium phosphates 2 packet PRN   potassium, sodium phosphates 2 packet PRN   potassium, sodium phosphates 2 packet PRN   promethazine 25 mg Q6H PRN     Today I personally reviewed pertinent medications, imaging, laboratory results, notably:    Diet  Diet diabetic [facility]; 1500 Calorie  Diet diabetic [facility]; 1500 Calorie        Assessment/Plan:     Neuro   * Altered mental status    Transferred to [facility] for observation and continuous EEG monitoring. No events overnight. 10/29  10/30 Consult with epilepsy and patient appropriate to transfer to Hospital Medicine to remain on continuous EEG monitoring.      Migraines    Continue topamax   Continue amitriptyline     Pulmonary   Asthma    - prn duo nebs   Continue nightly montelukast     Cardiac/Vascular   HLD (hyperlipidemia)    - statins      Endocrine   Hypothyroidism due to Hashimoto's thyroiditis    - continue home meds      Prediabetes    Hbg A1c  - SSI   Continue glucose monitoring     GI   GERD (gastroesophageal reflux disease)    - continue PPI      Other   MAURO (obstructive sleep apnea)    - cpap qhs ordered.  Pt home CPAP is nasal and hospital mask may be uncomfortable therefore patient uses prn as needed.  Upon evaluation, Denies SOB during rest           The patient is being Prophylaxed for:  Venous Thromboembolism with: Chemical  Stress  Ulcer with: PPI  Ventilator Pneumonia with: not applicable    Activity Orders          Activity as tolerated starting at 10/30 1441        Full Code    Jennifer Kasper NP  Neurocritical Care  [facility]

## 2018-10-30 NOTE — ASSESSMENT & PLAN NOTE
- cpap qhs ordered.  Pt home CPAP is nasal and hospital mask may be uncomfortable therefore patient uses prn as needed.  Upon evaluation, Denies SOB during rest

## 2018-10-30 NOTE — H&P
[facility]  Neurocritical Care  History & Physical    Admit Date: 10/27/2018  Service Date: 10/29/2018  Length of Stay: 2    Subjective:     Chief Complaint: Altered mental status    History of Present Illness: Vivian Saini is a 47 y/o female with a h/o migraines (on Topamax), prediabetes on Metformin, GERD, asthma, HLD, hypothyroidism secondary to Hashimoto's, and MAURO who was admitted for hospital medicine service for evaluation of starring spells. 10/29 rapid response called for seizures / reported as starring spells with jerking movements of head and neck lasting > 30 mins and hence admitted to [facility].     On further review, extensive work-up over the past few months for this same presentation including imaging with CT Head, Brain MRI, multiple EEGs, and LP. No clear etiology yet. Upon arrival to [facility], patient is hemodynamically stable. Lab work in-patient has been grossly unremarkable. UA, urine toxicology screen at OSH wnl. TSH, Vit B12, and folate WNL. RPR non-reactive. Vitamin B1, B3, B6 levels all pending.    Plans for 24 NCC monitoring with continuous EEG monitoring / capturing and characterizing the events.     Past Medical History:   Diagnosis Date    Asthma     GERD (gastroesophageal reflux disease)     HLD (hyperlipidemia)     Hypothyroidism due to Hashimoto's thyroiditis     Migraines     MAURO (obstructive sleep apnea)     Prediabetes      Past Surgical History:   Procedure Laterality Date    SHOULDER SURGERY Left       No current facility-administered medications on file prior to encounter.      Current Outpatient Medications on File Prior to Encounter   Medication Sig Dispense Refill    amitriptyline (ELAVIL) 100 MG tablet Take 100 mg by mouth every evening.      aspirin (ECOTRIN) 81 MG EC tablet Take 81 mg by mouth once daily.      atorvastatin (LIPITOR) 20 MG tablet Take 20 mg by mouth every evening.      levothyroxine (SYNTHROID) 50 MCG tablet Take 50 mcg by  mouth before breakfast.      linaclotide (LINZESS) 290 mcg Cap Take 290 mcg by mouth every other day.      loratadine (CLARITIN) 10 mg tablet Take 10 mg by mouth once daily.      metFORMIN (GLUCOPHAGE) 500 MG tablet Take 500 mg by mouth 2 (two) times daily with meals.      montelukast (SINGULAIR) 10 mg tablet Take 10 mg by mouth every evening.      multivitamin capsule Take 1 capsule by mouth once daily.      omeprazole (PRILOSEC) 20 MG capsule Take 20 mg by mouth every evening.      omeprazole (PRILOSEC) 40 MG capsule Take 40 mg by mouth once daily.      topiramate (TOPAMAX) 100 MG tablet Take 100 mg by mouth once daily.      topiramate (TOPAMAX) 100 MG tablet Take 150 mg by mouth every evening.      vitamin D (VITAMIN D3) 1000 units Tab Take 1,000 Units by mouth once daily.        Allergies: Penicillins; Sulfa (sulfonamide antibiotics); and Clarithromycin    Family History   Problem Relation Age of Onset    Cancer Mother      Social History     Tobacco Use    Smoking status: Never Smoker   Substance Use Topics    Alcohol use: No     Frequency: Never    Drug use: Not on file     Review of Systems   Unable to perform ROS: Acuity of condition   Constitutional: Negative for fever.   HENT: Negative for trouble swallowing.    Respiratory: Negative for shortness of breath.    Cardiovascular: Negative for chest pain.   Gastrointestinal: Negative for abdominal pain.   Neurological: Positive for weakness. Negative for seizures, facial asymmetry and headaches.   Psychiatric/Behavioral: Negative for agitation and behavioral problems.     Objective:     Vitals:    Temp: 97.7 °F (36.5 °C)  Pulse: 90  Rhythm: normal sinus rhythm  BP: 96/65  MAP (mmHg): 76  Resp: 15  SpO2: 100 %  Oxygen Concentration (%): 40  O2 Device (Oxygen Therapy): room air    Temp  Min: 96.4 °F (35.8 °C)  Max: 99.2 °F (37.3 °C)  Pulse  Min: 74  Max: 107  BP  Min: 80/53  Max: 143/73  MAP (mmHg)  Min: 61  Max: 85  Resp  Min: 14  Max: 30  SpO2   Min: 92 %  Max: 100 %  Oxygen Concentration (%)  Min: 30  Max: 40    No intake/output data recorded.   Unmeasured Output  Urine Occurrence: 1  Stool Occurrence: 1       Physical Exam   Constitutional: No distress.   HENT:   Head: Normocephalic and atraumatic.   Eyes: EOM are normal. Pupils are equal, round, and reactive to light.   Neck: Neck supple.   Cardiovascular:   No murmur heard.  Pulmonary/Chest: No respiratory distress.   Abdominal: Soft.   Musculoskeletal: She exhibits no deformity.   Neurological: She is alert. She displays no tremor. No cranial nerve deficit or sensory deficit. She exhibits normal muscle tone. GCS eye subscore is 4. GCS verbal subscore is 5. GCS motor subscore is 6.   B/L LE weakness / No focal deficits      Unable to test gait due to level of consciousness.    Today I personally reviewed pertinent medications, lines/drains/airways, imaging, cardiology results, laboratory results, microbiology results,     Assessment/Plan:     Neuro   * Altered mental status    - 49 y/o female with a h/o migraines (on Topamax), prediabetes on Metformin, GERD, asthma, HLD, hypothyroidism secondary to Hashimoto's, and MAURO who was admitted for evaluation of starring spells.   - 10/29 rapid response called for seizures / reported as starring spells with jerking movements of head and neck lasting > 30 mins and hence admitted to [facility].      - Prior CT Head, Brain MRI, multiple EEGs, and LP negative   - NUA, urine toxicology screen at OSH wnl. TSH, Vit B12, and folate WNL. RPR non-reactive. Vitamin B1, B3, B6 levels all pending.     Plans   - 24 NCC monitoring with continuous EEG monitoring / capturing and characterizing the events.  - epilepsy following, appreciate recs   - control infection / control lytes / avoid acidosis, hypoxia       Migraines    - continue topamax      Pulmonary   Asthma    - prn duo nebs      Cardiac/Vascular   HLD (hyperlipidemia)    - statins      Endocrine   Hypothyroidism due to  Hashimoto's thyroiditis    - continue home meds      Prediabetes    - SSI      GI   GERD (gastroesophageal reflux disease)    - continue PPI      Other   MAURO (obstructive sleep apnea)    - cpap qhs            The patient is being Prophylaxed for:  Venous Thromboembolism with: Mechanical or Chemical  Stress Ulcer with: Not Applicable   Ventilator Pneumonia with: not applicable    Activity Orders          None        Full Code    Keturah Perdomo MD  Neurocritical Care  [facility]

## 2018-10-30 NOTE — PROVIDER TRANSFER
"[facility]  Transfer of Care Note      Patient Name: Vivian Peterson  MRN: [id number]  Admission Date: 10/27/2018  Hospital Length of Stay: 3 days  Transfer Date: 10/30/2018  Attending Physician: Jensen Herrera MD   Primary Care Provider: Provider [employer]  Reason for Admission: AMS    HPI:   Vivian Saini is a 47 y/o female with a h/o migraines (on Topamax), prediabetes on Metformin, GERD, asthma, HLD, hypothyroidism secondary to Hashimoto's, and MAURO who was admitted for hospital medicine service for evaluation of starring spells. 10/29 rapid response called for seizures / reported as starring spells with jerking movements of head and neck lasting > 30 mins and hence admitted to [facility].     On further review, extensive work-up over the past few months for this same presentation including imaging with CT Head, Brain MRI, multiple EEGs, and LP. No clear etiology yet. Upon arrival to [facility] [place] [facility], patient is hemodynamically stable. Lab work in-patient has been grossly unremarkable. UA, urine toxicology screen at OSH wnl. TSH, Vit B12, and folate WNL. RPR non-reactive. Vitamin B1, B3, B6 levels all pending.        Hospital Course:   10/27 Consulted neurology, ordered EEG. Requesting MRI of head and Cand T spine - will need to clarify with or without contrast  10/27 Brain/Cervical/Thoracic spine MRI (10/27): "Right frontal lobe developmental venous anomaly." Anomaly does not explain symptoms. Cervical/thoracic spine unremarkable  10/29 rapid response called for seizures / reported as starring spells with jerking movements of head and neck lasting > 30 mins and hence admitted to [facility]. EEG monitoring in place   10/30 No events overnight. Stable for transfer to Hospital Medicine      * Altered mental status    Transferred to [facility] for observation and continuous EEG monitoring. No events overnight. 10/29  10/30 Consult with epilepsy and patient appropriate to transfer to Hospital Medicine to remain on " continuous EEG monitoring.      MAURO (obstructive sleep apnea)    - cpap qhs ordered.  Pt home CPAP is nasal and hospital mask may be uncomfortable therefore patient uses prn as needed.  Upon evaluation, Denies SOB during rest     Migraines    Continue topamax   Continue amitriptyline     Asthma    - prn duo nebs   Continue nightly montelukast     GERD (gastroesophageal reflux disease)    - continue PPI      HLD (hyperlipidemia)    - statins      Hypothyroidism due to Hashimoto's thyroiditis    - continue home meds      Prediabetes    Hbg A1c  - SSI   Continue glucose monitoring       Consults (From admission, onward)        Status Ordering Provider     Inpatient consult to Neurology  Once     Provider:  (Not yet assigned)    Completed EMMA TOURE     Inpatient consult to Social Work  Once     Provider:  (Not yet assigned)    Acknowledged EMMA TOURE        * No surgery found *    Diet Orders          Diet diabetic [facility]; 1500 Calorie: Diabetic starting at 10/27 0513        Activity Orders          Activity as tolerated starting at 10/30 1449        Pending Diagnostic Studies:     Procedure Component Value Units Date/Time    CTA Chest Non Coronary [[id number]]     Order Status:  Sent Lab Status:  No result     US Lower Extremity Veins Bilateral [[id number]]     Order Status:  Sent Lab Status:  No result         Jennifer Kasper NP  [facility]

## 2018-10-30 NOTE — PROVIDER TRANSFER
"[facility]  Transfer of Care Note      Patient Name: Vivian Peterson  MRN: [id number]  Admission Date: 10/27/2018  Hospital Length of Stay: 3 days  Transfer Date: 10/30/2018  Attending Physician: Jensen Herrera MD   Primary Care Provider: Provider Notinsystem  Reason for Admission: AMS    HPI:   Vivian Saini is a 49 y/o female with a h/o migraines (on Topamax), prediabetes on Metformin, GERD, asthma, HLD, hypothyroidism secondary to Hashimoto's, and MAURO who was admitted for hospital medicine service for evaluation of starring spells. 10/29 rapid response called for seizures / reported as starring spells with jerking movements of head and neck lasting > 30 mins and hence admitted to [facility].     On further review, extensive work-up over the past few months for this same presentation including imaging with CT Head, Brain MRI, multiple EEGs, and LP. No clear etiology yet. Upon [place] [facility], patient is hemodynamically stable. Lab work in-patient has been grossly unremarkable. UA, urine toxicology screen at OSH wnl. TSH, Vit B12, and folate WNL. RPR non-reactive. Vitamin B1, B3, B6 levels all pending.        Hospital Course:   10/27 Consulted neurology, ordered EEG. Requesting MRI of head and Cand T spine - will need to clarify with or without contrast  10/27 Brain/Cervical/Thoracic spine MRI (10/27): "Right frontal lobe developmental venous anomaly." Anomaly does not explain symptoms. Cervical/thoracic spine unremarkable  10/29 rapid response called for seizures / reported as starring spells with jerking movements of head and neck lasting > 30 mins and hence admitted to [facility]. EEG monitoring in place   10/30 No events overnight. Stable for transfer to Hospital Medicine      * Altered mental status    Transferred to [facility] for observation and continuous EEG monitoring. No events overnight. 10/29  10/30 Consult with epilepsy and patient appropriate to transfer to Hospital Medicine to remain on " continuous EEG monitoring.      MAURO (obstructive sleep apnea)    - cpap qhs ordered.  Pt home CPAP is nasal and hospital mask may be uncomfortable therefore patient uses prn as needed.  Upon evaluation, Denies SOB during rest     Migraines    Continue topamax   Continue amitriptyline     Asthma    - prn duo nebs   Continue nightly montelukast     GERD (gastroesophageal reflux disease)    - continue PPI      HLD (hyperlipidemia)    - statins      Hypothyroidism due to Hashimoto's thyroiditis    - continue home meds      Prediabetes    Hbg A1c  - SSI   Continue glucose monitoring       Consults (From admission, onward)        Status Ordering Provider     Inpatient consult to Neurology  Once     Provider:  (Not yet assigned)    Completed EMMA TOURE     Inpatient consult to Social Work  Once     Provider:  (Not yet assigned)    Acknowledged EMMA TOURE        * No surgery found *    Diet Orders          Diet diabetic [facility]; 1500 Calorie: Diabetic starting at 10/27 0513        Activity Orders          Activity as tolerated starting at 10/30 1449        Pending Diagnostic Studies:     Procedure Component Value Units Date/Time    CTA Chest Non Coronary [[id number]]     Order Status:  Sent Lab Status:  No result     US Lower Extremity Veins Bilateral [[id number]]     Order Status:  Sent Lab Status:  No result         Jennifer Kasper NP  [facility]

## 2018-10-30 NOTE — SUBJECTIVE & OBJECTIVE
Past Medical History:   Diagnosis Date    Asthma     GERD (gastroesophageal reflux disease)     HLD (hyperlipidemia)     Hypothyroidism due to Hashimoto's thyroiditis     Migraines     MAURO (obstructive sleep apnea)     Prediabetes      Past Surgical History:   Procedure Laterality Date    SHOULDER SURGERY Left       No current facility-administered medications on file prior to encounter.      Current Outpatient Medications on File Prior to Encounter   Medication Sig Dispense Refill    amitriptyline (ELAVIL) 100 MG tablet Take 100 mg by mouth every evening.      aspirin (ECOTRIN) 81 MG EC tablet Take 81 mg by mouth once daily.      atorvastatin (LIPITOR) 20 MG tablet Take 20 mg by mouth every evening.      levothyroxine (SYNTHROID) 50 MCG tablet Take 50 mcg by mouth before breakfast.      linaclotide (LINZESS) 290 mcg Cap Take 290 mcg by mouth every other day.      loratadine (CLARITIN) 10 mg tablet Take 10 mg by mouth once daily.      metFORMIN (GLUCOPHAGE) 500 MG tablet Take 500 mg by mouth 2 (two) times daily with meals.      montelukast (SINGULAIR) 10 mg tablet Take 10 mg by mouth every evening.      multivitamin capsule Take 1 capsule by mouth once daily.      omeprazole (PRILOSEC) 20 MG capsule Take 20 mg by mouth every evening.      omeprazole (PRILOSEC) 40 MG capsule Take 40 mg by mouth once daily.      topiramate (TOPAMAX) 100 MG tablet Take 100 mg by mouth once daily.      topiramate (TOPAMAX) 100 MG tablet Take 150 mg by mouth every evening.      vitamin D (VITAMIN D3) 1000 units Tab Take 1,000 Units by mouth once daily.        Allergies: Penicillins; Sulfa (sulfonamide antibiotics); and Clarithromycin    Family History   Problem Relation Age of Onset    Cancer Mother      Social History     Tobacco Use    Smoking status: Never Smoker   Substance Use Topics    Alcohol use: No     Frequency: Never    Drug use: Not on file     Review of Systems   Unable to perform ROS: Acuity of  condition   Constitutional: Negative for fever.   HENT: Negative for trouble swallowing.    Respiratory: Negative for shortness of breath.    Cardiovascular: Negative for chest pain.   Gastrointestinal: Negative for abdominal pain.   Neurological: Positive for weakness. Negative for seizures, facial asymmetry and headaches.   Psychiatric/Behavioral: Negative for agitation and behavioral problems.     Objective:     Vitals:    Temp: 97.7 °F (36.5 °C)  Pulse: 90  Rhythm: normal sinus rhythm  BP: 96/65  MAP (mmHg): 76  Resp: 15  SpO2: 100 %  Oxygen Concentration (%): 40  O2 Device (Oxygen Therapy): room air    Temp  Min: 96.4 °F (35.8 °C)  Max: 99.2 °F (37.3 °C)  Pulse  Min: 74  Max: 107  BP  Min: 80/53  Max: 143/73  MAP (mmHg)  Min: 61  Max: 85  Resp  Min: 14  Max: 30  SpO2  Min: 92 %  Max: 100 %  Oxygen Concentration (%)  Min: 30  Max: 40    No intake/output data recorded.   Unmeasured Output  Urine Occurrence: 1  Stool Occurrence: 1       Physical Exam   Constitutional: No distress.   HENT:   Head: Normocephalic and atraumatic.   Eyes: EOM are normal. Pupils are equal, round, and reactive to light.   Neck: Neck supple.   Cardiovascular:   No murmur heard.  Pulmonary/Chest: No respiratory distress.   Abdominal: Soft.   Musculoskeletal: She exhibits no deformity.   Neurological: She is alert. She displays no tremor. No cranial nerve deficit or sensory deficit. She exhibits normal muscle tone. GCS eye subscore is 4. GCS verbal subscore is 5. GCS motor subscore is 6.   B/L LE weakness / No focal deficits      Unable to test gait due to level of consciousness.    Today I personally reviewed pertinent medications, lines/drains/airways, imaging, cardiology results, laboratory results, microbiology results,

## 2018-10-30 NOTE — ASSESSMENT & PLAN NOTE
49 yo female who presented as transfer from OSH for further workup of spells of transient neurologic symptoms. Extensive workup since onset with no noteable abnormal findings. Onset of episodes coincided with abrupt discontinuation of thyroid medication, but episodes have continued since it was resumed.    Recommendations:  - Episode not fully capture on EEG, but portion that was with no noteable epileptiform correlate  - Continue vEEG  - Recommend holding home Topiramate in an effort to capture additional even on EEG, no indication to start additional AED at this point  - Continued workup of other possible etiologies of events per primary team    Plan of care discussed with primary service, patient and  at bedside. Will continue to follow.

## 2018-10-31 LAB
ALBUMIN SERPL BCP-MCNC: 3.7 G/DL
ALP SERPL-CCNC: 124 U/L
ALT SERPL W/O P-5'-P-CCNC: 25 U/L
ANION GAP SERPL CALC-SCNC: 8 MMOL/L
AST SERPL-CCNC: 17 U/L
BASOPHILS # BLD AUTO: 0.03 K/UL
BASOPHILS NFR BLD: 0.6 %
BILIRUB SERPL-MCNC: 0.2 MG/DL
BUN SERPL-MCNC: 17 MG/DL
CALCIUM SERPL-MCNC: 9 MG/DL
CHLORIDE SERPL-SCNC: 113 MMOL/L
CO2 SERPL-SCNC: 18 MMOL/L
CREAT SERPL-MCNC: 0.9 MG/DL
DIFFERENTIAL METHOD: NORMAL
EOSINOPHIL # BLD AUTO: 0.1 K/UL
EOSINOPHIL NFR BLD: 1 %
ERYTHROCYTE [DISTWIDTH] IN BLOOD BY AUTOMATED COUNT: 12.8 %
EST. GFR  (AFRICAN AMERICAN): >60 ML/MIN/1.73 M^2
EST. GFR  (NON AFRICAN AMERICAN): >60 ML/MIN/1.73 M^2
GLUCOSE SERPL-MCNC: 93 MG/DL
HCT VFR BLD AUTO: 39.6 %
HGB BLD-MCNC: 12.8 G/DL
IMM GRANULOCYTES # BLD AUTO: 0.01 K/UL
IMM GRANULOCYTES NFR BLD AUTO: 0.2 %
LYMPHOCYTES # BLD AUTO: 1.9 K/UL
LYMPHOCYTES NFR BLD: 36 %
MAGNESIUM SERPL-MCNC: 2.2 MG/DL
MCH RBC QN AUTO: 29.3 PG
MCHC RBC AUTO-ENTMCNC: 32.3 G/DL
MCV RBC AUTO: 91 FL
MONOCYTES # BLD AUTO: 0.5 K/UL
MONOCYTES NFR BLD: 8.6 %
NEUTROPHILS # BLD AUTO: 2.8 K/UL
NEUTROPHILS NFR BLD: 53.6 %
NRBC BLD-RTO: 0 /100 WBC
PHOSPHATE SERPL-MCNC: 3.3 MG/DL
PLATELET # BLD AUTO: 172 K/UL
PMV BLD AUTO: 10.8 FL
POCT GLUCOSE: 108 MG/DL (ref 70–110)
POCT GLUCOSE: 117 MG/DL (ref 70–110)
POCT GLUCOSE: 90 MG/DL (ref 70–110)
POCT GLUCOSE: 91 MG/DL (ref 70–110)
POTASSIUM SERPL-SCNC: 3.7 MMOL/L
PROT SERPL-MCNC: 6.6 G/DL
RBC # BLD AUTO: 4.37 M/UL
SODIUM SERPL-SCNC: 139 MMOL/L
WBC # BLD AUTO: 5.25 K/UL

## 2018-10-31 PROCEDURE — 95951 PR EEG MONITORING/VIDEORECORD: CPT | Mod: 26,,, | Performed by: PSYCHIATRY & NEUROLOGY

## 2018-10-31 PROCEDURE — A4216 STERILE WATER/SALINE, 10 ML: HCPCS | Performed by: PHYSICIAN ASSISTANT

## 2018-10-31 PROCEDURE — 85025 COMPLETE CBC W/AUTO DIFF WBC: CPT

## 2018-10-31 PROCEDURE — 94660 CPAP INITIATION&MGMT: CPT

## 2018-10-31 PROCEDURE — 80053 COMPREHEN METABOLIC PANEL: CPT

## 2018-10-31 PROCEDURE — 20600001 HC STEP DOWN PRIVATE ROOM

## 2018-10-31 PROCEDURE — 99231 SBSQ HOSP IP/OBS SF/LOW 25: CPT | Mod: ,,, | Performed by: NURSE PRACTITIONER

## 2018-10-31 PROCEDURE — 94761 N-INVAS EAR/PLS OXIMETRY MLT: CPT

## 2018-10-31 PROCEDURE — 25000003 PHARM REV CODE 250: Performed by: PHYSICIAN ASSISTANT

## 2018-10-31 PROCEDURE — 95951 HC EEG MONITORING/VIDEO RECORD: CPT

## 2018-10-31 PROCEDURE — 63600175 PHARM REV CODE 636 W HCPCS: Performed by: INTERNAL MEDICINE

## 2018-10-31 PROCEDURE — 99900035 HC TECH TIME PER 15 MIN (STAT)

## 2018-10-31 PROCEDURE — 99232 SBSQ HOSP IP/OBS MODERATE 35: CPT | Mod: ,,, | Performed by: PSYCHIATRY & NEUROLOGY

## 2018-10-31 PROCEDURE — 83735 ASSAY OF MAGNESIUM: CPT

## 2018-10-31 PROCEDURE — 84100 ASSAY OF PHOSPHORUS: CPT

## 2018-10-31 RX ADMIN — DOCUSATE SODIUM 100 MG: 100 CAPSULE, LIQUID FILLED ORAL at 08:10

## 2018-10-31 RX ADMIN — PANTOPRAZOLE SODIUM 40 MG: 40 TABLET, DELAYED RELEASE ORAL at 08:10

## 2018-10-31 RX ADMIN — ATORVASTATIN CALCIUM 20 MG: 20 TABLET, FILM COATED ORAL at 09:10

## 2018-10-31 RX ADMIN — AMITRIPTYLINE HYDROCHLORIDE 100 MG: 25 TABLET, FILM COATED ORAL at 09:10

## 2018-10-31 RX ADMIN — Medication 3 ML: at 05:10

## 2018-10-31 RX ADMIN — THERA TABS 1 TABLET: TAB at 08:10

## 2018-10-31 RX ADMIN — ASPIRIN 81 MG: 81 TABLET, COATED ORAL at 08:10

## 2018-10-31 RX ADMIN — CETIRIZINE HYDROCHLORIDE 10 MG: 5 TABLET ORAL at 08:10

## 2018-10-31 RX ADMIN — VITAMIN D, TAB 1000IU (100/BT) 1000 UNITS: 25 TAB at 08:10

## 2018-10-31 RX ADMIN — Medication 3 ML: at 09:10

## 2018-10-31 RX ADMIN — DOCUSATE SODIUM 100 MG: 100 CAPSULE, LIQUID FILLED ORAL at 09:10

## 2018-10-31 RX ADMIN — MONTELUKAST SODIUM 10 MG: 10 TABLET, FILM COATED ORAL at 09:10

## 2018-10-31 RX ADMIN — ENOXAPARIN SODIUM 40 MG: 100 INJECTION SUBCUTANEOUS at 05:10

## 2018-10-31 RX ADMIN — LEVOTHYROXINE SODIUM 50 MCG: 50 TABLET ORAL at 05:10

## 2018-10-31 NOTE — ASSESSMENT & PLAN NOTE
Transferred to [facility] for observation and continuous EEG monitoring. No events overnight. 10/29  10/30 Consult with epilepsy and patient appropriate to transfer to Hospital Medicine to remain on continuous EEG monitoring.

## 2018-10-31 NOTE — PLAN OF CARE
Hospital Medicine Brief Note    Notified by RN that patient had arrived from the ICU.    On my evaluation, she is resting calmly,  at the bedside. EEG is in place. No acute complaints.     Medications and orders reviewed, floor appropriate.    Plan to continue EEG overnight, follow up with Epilepsy consultant, continue current medications as ordered.    Rubi Aguila M.D., M.P.H.  Department of Hospital Medicine  [facility]  (pager) [phone] (spect) [phone]

## 2018-10-31 NOTE — SUBJECTIVE & OBJECTIVE
"Interval History: No episodes overnight. Reports intermittent feeling of electrical shock down temples into jaw.  reports she appears to speak with clenched teeth and stick out lower jaw when this occurs.  believes episodes tend to increase in situations of increased stress - notices change in speech when practitioners enter the room. Patients brother has history of "stress related" seizures.     Current Facility-Administered Medications   Medication Dose Route Frequency Provider Last Rate Last Dose    acetaminophen tablet 650 mg  650 mg Oral Q4H PRN Ines Gillette PA-C   650 mg at 10/28/18 2154    albuterol-ipratropium 2.5 mg-0.5 mg/3 mL nebulizer solution 3 mL  3 mL Nebulization Q4H PRN Ines Gillette PA-C        amitriptyline tablet 100 mg  100 mg Oral QHS Ines Gillette PA-C   100 mg at 10/30/18 2106    aspirin EC tablet 81 mg  81 mg Oral Daily Ines Gillette PA-C   81 mg at 10/31/18 0859    atorvastatin tablet 20 mg  20 mg Oral QHS Ines Gillette PA-C   20 mg at 10/30/18 2106    cetirizine tablet 10 mg  10 mg Oral Daily Ines Gillette PA-C   10 mg at 10/31/18 0859    dextrose 50% injection 12.5 g  12.5 g Intravenous PRN Ines Gillette PA-C        dextrose 50% injection 25 g  25 g Intravenous PRN Ines Gillette PA-C        docusate sodium capsule 100 mg  100 mg Oral BID Ines Gillette PA-C   100 mg at 10/31/18 0859    enoxaparin injection 40 mg  40 mg Subcutaneous Daily Elaina Dillon MD   40 mg at 10/30/18 1639    glucagon (human recombinant) injection 1 mg  1 mg Intramuscular PRN Ines Gillette PA-C        glucose chewable tablet 16 g  16 g Oral PRN Ines Gillette PA-C        glucose chewable tablet 24 g  24 g Oral PRN Ines Gillette PA-C        insulin aspart U-100 pen 0-5 Units  0-5 Units Subcutaneous QID (AC + HS) PRN Ines Gillette PA-C        levothyroxine tablet 50 mcg  50 mcg Oral Before breakfast " Ines Gillette PA-C   50 mcg at 10/31/18 0518    loperamide capsule 2 mg  2 mg Oral PRN Ines Gillette PA-C        magnesium oxide tablet 800 mg  800 mg Oral PRN Keturah Perdomo MD        magnesium oxide tablet 800 mg  800 mg Oral PRN Keturah Perdomo MD        montelukast tablet 10 mg  10 mg Oral QHS Ines Gillette PA-C   10 mg at 10/30/18 2107    multivitamin tablet 1 tablet  1 tablet Oral Daily Ines Gillette PA-C   1 tablet at 10/31/18 0858    ondansetron disintegrating tablet 8 mg  8 mg Oral Q8H PRN Ines Gillette PA-C        pantoprazole EC tablet 40 mg  40 mg Oral Daily Ines Gillette PA-C   40 mg at 10/31/18 0858    polyethylene glycol packet 17 g  17 g Oral TID PRN Ines Gillette PA-C        potassium chloride 10% oral solution 40 mEq  40 mEq Oral PRN Keturah Perdomo MD        potassium chloride 10% oral solution 40 mEq  40 mEq Oral PRN Keturah Perdomo MD        potassium chloride 10% oral solution 60 mEq  60 mEq Oral PRN Keturah Perdomo MD        potassium, sodium phosphates 280-160-250 mg packet 2 packet  2 packet Oral PRN Keturah Perdomo MD        potassium, sodium phosphates 280-160-250 mg packet 2 packet  2 packet Oral PRN Keturah Perdomo MD        potassium, sodium phosphates 280-160-250 mg packet 2 packet  2 packet Oral PRN Keturah Perdomo MD        promethazine tablet 25 mg  25 mg Oral Q6H PRN Ines Gillette PA-C        sodium chloride 0.9% flush 3 mL  3 mL Intravenous Q8H Ines Gillette PA-C   3 mL at 10/31/18 0518    vitamin D 1000 units tablet 1,000 Units  1,000 Units Oral Daily Ines Gillette PA-C   1,000 Units at 10/31/18 0858     Continuous Infusions:    Review of Systems   Constitutional: Negative for chills, fatigue and fever.   Respiratory: Negative for cough and shortness of breath.    Cardiovascular: Negative for chest pain and leg swelling.   Gastrointestinal: Negative for  nausea and vomiting.   Musculoskeletal: Positive for gait problem. Negative for back pain and myalgias.   Skin: Negative for pallor, rash and wound.   Neurological: Positive for speech difficulty (intermittent), weakness and headaches. Negative for dizziness, tremors and facial asymmetry.   Psychiatric/Behavioral: Negative for agitation and confusion. The patient is not hyperactive.      Objective:     Vital Signs (Most Recent):  Temp: 98.8 °F (37.1 °C) (10/31/18 1605)  Pulse: 86 (10/31/18 1605)  Resp: 17 (10/31/18 1605)  BP: (!) 91/54 (10/31/18 1605)  SpO2: 95 % (10/31/18 1605) Vital Signs (24h Range):  Temp:  [97.6 °F (36.4 °C)-98.8 °F (37.1 °C)] 98.8 °F (37.1 °C)  Pulse:  [] 86  Resp:  [13-37] 17  SpO2:  [94 %-100 %] 95 %  BP: ()/(50-75) 91/54     Weight: 67 kg (147 lb 11.3 oz)  Body mass index is 28.85 kg/m².    Physical Exam   Constitutional: She is oriented to person, place, and time. She appears well-developed and well-nourished. No distress.   HENT:   Head: Normocephalic and atraumatic.   Right Ear: External ear normal.   Left Ear: External ear normal.   Nose: Nose normal.   Eyes: Conjunctivae and EOM are normal. Pupils are equal, round, and reactive to light.   Neck: Normal range of motion. No thyromegaly present.   Cardiovascular: Intact distal pulses.   Pulmonary/Chest: Effort normal. No respiratory distress.   Musculoskeletal: Normal range of motion. She exhibits no deformity.   Neurological: She is oriented to person, place, and time.   Skin: Skin is warm and dry. She is not diaphoretic. No erythema.   Psychiatric:   Fluent conversation upon entering room, as conversation progresses, speech a little more delayed, speaks with clenched teeth at times       NEUROLOGICAL EXAMINATION:     MENTAL STATUS   Oriented to person, place, and time.   Level of consciousness: alert       Oriented to person, , place, year, month.      CRANIAL NERVES     CN III, IV, VI   Pupils are equal, round, and  reactive to light.  Extraocular motions are normal.     CN V   Facial sensation intact.     CN VII   Facial expression full, symmetric.     CN VIII   CN VIII normal.     CN IX, X   CN IX normal.   CN X normal.     CN XI   Right sternocleidomastoid strength: weak  Left sternocleidomastoid strength: weak    CN XII   CN XII normal.     MOTOR EXAM        BUE 4/5 strength  Decreased BLE strength, but some improvement       Significant Labs: All pertinent lab results from the past 24 hours have been reviewed.    Significant Studies: I have reviewed all pertinent imaging results/findings within the past 24 hours.

## 2018-10-31 NOTE — ASSESSMENT & PLAN NOTE
47 yo female who presented as transfer from OSH for further workup of spells of transient neurologic symptoms. Extensive workup since onset with no noteable abnormal findings. Onset of episodes coincided with abrupt discontinuation of thyroid medication, but episodes have continued since it was resumed.    Recommendations:  - Episode not fully capture on EEG, but portion that was with no noteable epileptiform correlate  - Continue vEEG  - Continue holding home Topiramate in an effort to capture additional even on EEG, no indication to start additional AED at this point  - Continued workup of other possible etiologies of events per primary team    Plan of care discussed with primary service, patient and  at bedside. Will continue to follow.

## 2018-10-31 NOTE — PROGRESS NOTES
"[facility]  Neurocritical Care  Progress Note    Admit Date: 10/27/2018  Service Date: 10/31/2018  Length of Stay: 4    Subjective:     Chief Complaint: Altered mental status    History of Present Illness: Vivian Saini is a 49 y/o female with a h/o migraines (on Topamax), prediabetes on Metformin, GERD, asthma, HLD, hypothyroidism secondary to Hashimoto's, and MAURO who was admitted for hospital medicine service for evaluation of starring spells. 10/29 rapid response called for seizures / reported as starring spells with jerking movements of head and neck lasting > 30 mins and hence admitted to [facility].     On further review, extensive work-up over the past few months for this same presentation including imaging with CT Head, Brain MRI, multiple EEGs, and LP. No clear etiology yet. Upon arrival to [facility], patient is hemodynamically stable. Lab work in-patient has been grossly unremarkable. UA, urine toxicology screen at OSH wnl. TSH, Vit B12, and folate WNL. RPR non-reactive. Vitamin B1, B3, B6 levels all pending.        Hospital Course: 10/27 Consulted neurology, ordered EEG. Requesting MRI of head and Cand T spine - will need to clarify with or without contrast  10/27 Brain/Cervical/Thoracic spine MRI (10/27): "Right frontal lobe developmental venous anomaly." Anomaly does not explain symptoms. Cervical/thoracic spine unremarkable  10/29 rapid response called for seizures / reported as starring spells with jerking movements of head and neck lasting > 30 mins and hence admitted to [facility]. EEG monitoring in place   10/30 No events overnight. Stable for transfer to Hospital Medicine  10/31 Transferred to [facility]; Epilepsy will continue to monitor EEG for event change as well as investigate futher causes of seizure-like activity    Interval History:  EEG monitoring remains in place. Alert Oriented to person place time . Follows commands. Transferred to [facility]. Epilepsy will continue to monitor EEG tracing " to capture seizure like event and further investigate reason for seizure -like activity    Review of Systems   Constitutional: Positive for activity change.   Eyes: Negative for visual disturbance.   Respiratory: Negative for shortness of breath.    Cardiovascular: Negative for chest pain.   Gastrointestinal: Negative for nausea.   Musculoskeletal: Negative for neck pain.   Neurological: Positive for weakness. Negative for dizziness.       Objective:     Vitals:  Temp: 98.8 °F (37.1 °C) (10/31/18 1605)  Pulse: 86 (10/31/18 1605)  Resp: 17 (10/31/18 1605)  BP: (!) 91/54 (10/31/18 1605)  SpO2: 95 % (10/31/18 1605)    Temp:  [97.6 °F (36.4 °C)-98.8 °F (37.1 °C)] 98.8 °F (37.1 °C)  Pulse:  [] 86  Resp:  [13-37] 17  SpO2:  [94 %-100 %] 95 %  BP: ()/(49-75) 91/54              10/30 0701 - 10/31 0700  In: 1613.4 [I.V.:1613.4]  Out: 695 [Urine:695]    Physical Exam   Constitutional: She appears well-developed.   HENT:   Head: Normocephalic.   Eyes: Pupils are equal, round, and reactive to light.   Neck: Normal range of motion. No JVD present.   Cardiovascular: Normal rate.   Pulmonary/Chest: Effort normal and breath sounds normal.   Abdominal: Soft. Bowel sounds are normal.   Musculoskeletal: Normal range of motion.   Neurological: She is alert.   Alert, oriented to person place time. Follows commands. Able to voice and make needs known. ODOM  spontaneously. Sensory intact in all extremities.      Unable to test coordination, gait due to level of consciousness.    Medications:  Continuous Scheduled  amitriptyline 100 mg QHS   aspirin 81 mg Daily   atorvastatin 20 mg QHS   cetirizine 10 mg Daily   docusate sodium 100 mg BID   enoxaparin 40 mg Daily   levothyroxine 50 mcg Before breakfast   montelukast 10 mg QHS   multivitamin 1 tablet Daily   pantoprazole 40 mg Daily   sodium chloride 0.9% 3 mL Q8H   vitamin D 1,000 Units Daily   PRN  acetaminophen 650 mg Q4H PRN   albuterol-ipratropium 3 mL Q4H PRN   dextrose 50%  12.5 g PRN   dextrose 50% 25 g PRN   glucagon (human recombinant) 1 mg PRN   glucose 16 g PRN   glucose 24 g PRN   insulin aspart U-100 0-5 Units QID (AC + HS) PRN   loperamide 2 mg PRN   magnesium oxide 800 mg PRN   magnesium oxide 800 mg PRN   ondansetron 8 mg Q8H PRN   polyethylene glycol 17 g TID PRN   potassium chloride 10% 40 mEq PRN   potassium chloride 10% 40 mEq PRN   potassium chloride 10% 60 mEq PRN   potassium, sodium phosphates 2 packet PRN   potassium, sodium phosphates 2 packet PRN   potassium, sodium phosphates 2 packet PRN   promethazine 25 mg Q6H PRN     Today I personally reviewed pertinent medications, laboratory results, notably:         Assessment/Plan:     Active Diagnoses:    Diagnosis Date Noted POA    PRINCIPAL PROBLEM:  Altered mental status [R41.82] 10/26/2018 Unknown    Prediabetes [R73.03] 10/27/2018 Yes     Chronic    Hypothyroidism due to Hashimoto's thyroiditis [E03.8, E06.3]  Yes     Chronic    HLD (hyperlipidemia) [E78.5]  Yes     Chronic    GERD (gastroesophageal reflux disease) [K21.9]  Yes     Chronic    Asthma [J45.909]  Yes     Chronic    Migraines [G43.909]  Yes     Chronic    MAURO (obstructive sleep apnea) [G47.33]  Yes      Problems Resolved During this Admission:             Prophylaxis:  Venous Thromboembolism: Chemical  Stress Ulcer: PPI  Ventilator Pneumonia: not applicable     Activity Orders          Activity as tolerated starting at 10/30 3806        Full Code    Jennifer Kasper NP  Neurocritical Care  [facility]

## 2018-10-31 NOTE — PLAN OF CARE
Patient AAO*4. VSS. SPO2>93% room air. Patient follows commands. EEG monitored continued. Voids per bed pan. Adequate appetite. Questions answered. Patient transferred to room 747. Spouse at bedside. CPAP machine sent. EEG sent. Chart sent. No new skin breakdown noted. ABILIO alcazar at bedside upon time of arrival. See flowsheet for details.

## 2018-10-31 NOTE — PLAN OF CARE
"Problem: Patient Care Overview  Goal: Plan of Care Review  PMH: DM, GERD,asthma, HLD, MAURO, hypothyroidism, migraines, thyroid storm    10/27: ED with AMS, abnormal gait, "seziure like" episodes   10/29: Core call to SICU for seizure like episode, continuous EEG  10/30: CTA cancelled per Neuro. D/C NS @100ml/hr.      Outcome: Ongoing (interventions implemented as appropriate)  Patient remains free from injury or fall. EEG initiated. Tolerating diet. No seizure activity like noted . Will continue to monitor.      "

## 2018-10-31 NOTE — NURSING TRANSFER
Nursing Transfer Note      10/31/2018     Transfer To: 747 from [id number]    Transfer via bed    Transfer with cardiac monitoring    Transported by RN    Medicines sent: yes    Chart send with patient: Yes    Notified: spouse    Patient reassessed at: [id number] 1200PM (date, time)    Upon arrival to floor: cardiac monitor applied, patient oriented to room, call bell in reach and bed in lowest position

## 2018-10-31 NOTE — NURSING TRANSFER
Nursing Transfer Note      10/31/2018     Transfer to room 747    Transfer via bed    Transfer with patient's balongins, telemetry,    Transported by Dinorah Landon RN    Medicines sent: no    Chart send with patient: yes    Notified: CHARO Aguila MD,  at bedside    Patient reassessed at: upon arrival    Upon arrival to floor: Vital signs taken, telemetry initiated. No changes from report received.  at bedside

## 2018-10-31 NOTE — PROGRESS NOTES
"[facility]  Neurology-Epilepsy  Progress Note    Patient Name: Vivian Peterson  MRN: [id number]  Admission Date: 10/27/2018  Hospital Length of Stay: 4 days  Code Status: Full Code   Attending Provider: Rubi Aguila MD  Primary Care Physician: Provider [employer]   Principal Problem:Altered mental status    Subjective:     Hospital Course:   10/27: Transfer to [facility] [place]  10/28: no acute events  10/29: Episode am, transferred to [facility] team. No noted epileptiform activity during end of episode (eeg not running for initial part of episode)  10/30: No further episodes since transfer to ICU, planning for stepdown to floor for further workup. Recommend holding Topamax.  10/31: No episodes, stepped down to floor. Off Topamax.    Interval History: No episodes overnight. Reports intermittent feeling of electrical shock down temples into jaw.  reports she appears to speak with clenched teeth and stick out lower jaw when this occurs.  believes episodes tend to increase in situations of increased stress - notices change in speech when practitioners enter the room. Patients brother has history of "stress related" seizures.     Current Facility-Administered Medications   Medication Dose Route Frequency Provider Last Rate Last Dose    acetaminophen tablet 650 mg  650 mg Oral Q4H PRN Ines Gillette PA-C   650 mg at 10/28/18 2154    albuterol-ipratropium 2.5 mg-0.5 mg/3 mL nebulizer solution 3 mL  3 mL Nebulization Q4H PRN Ines Gillette PA-C        amitriptyline tablet 100 mg  100 mg Oral QHS Ines Gillette PA-C   100 mg at 10/30/18 2106    aspirin EC tablet 81 mg  81 mg Oral Daily Ines Gillette PA-C   81 mg at 10/31/18 0859    atorvastatin tablet 20 mg  20 mg Oral QHS Ines Gillette PA-C   20 mg at 10/30/18 2106    cetirizine tablet 10 mg  10 mg Oral Daily Ines Gillette PA-C   10 mg at 10/31/18 0859    dextrose 50% injection 12.5 g  12.5 g " Intravenous PRN Ines Gillette PA-C        dextrose 50% injection 25 g  25 g Intravenous PRN Ines Gillette PA-C        docusate sodium capsule 100 mg  100 mg Oral BID Ines Gillette PA-C   100 mg at 10/31/18 0859    enoxaparin injection 40 mg  40 mg Subcutaneous Daily Elaina Dillon MD   40 mg at 10/30/18 1639    glucagon (human recombinant) injection 1 mg  1 mg Intramuscular PRN Ines Gillette PA-C        glucose chewable tablet 16 g  16 g Oral PRN Ines Gillette PA-C        glucose chewable tablet 24 g  24 g Oral PRN Ines Gillette PA-C        insulin aspart U-100 pen 0-5 Units  0-5 Units Subcutaneous QID (AC + HS) PRN Ines Gillette PA-C        levothyroxine tablet 50 mcg  50 mcg Oral Before breakfast Ines Gillette PA-C   50 mcg at 10/31/18 0518    loperamide capsule 2 mg  2 mg Oral PRN Ines Gillette PA-C        magnesium oxide tablet 800 mg  800 mg Oral PRN Keturah Perdomo MD        magnesium oxide tablet 800 mg  800 mg Oral PRN Keturah Perdomo MD        montelukast tablet 10 mg  10 mg Oral QHS Ines Gillette PA-C   10 mg at 10/30/18 2107    multivitamin tablet 1 tablet  1 tablet Oral Daily Ines Gillette PA-C   1 tablet at 10/31/18 0858    ondansetron disintegrating tablet 8 mg  8 mg Oral Q8H PRN Ines Gillette PA-C        pantoprazole EC tablet 40 mg  40 mg Oral Daily Ines Gillette PA-C   40 mg at 10/31/18 0858    polyethylene glycol packet 17 g  17 g Oral TID PRN Ines Gillette PA-C        potassium chloride 10% oral solution 40 mEq  40 mEq Oral PRN Keturah Perdomo MD        potassium chloride 10% oral solution 40 mEq  40 mEq Oral PRN Keturah Perdomo MD        potassium chloride 10% oral solution 60 mEq  60 mEq Oral PRN Keturah Perdomo MD        potassium, sodium phosphates 280-160-250 mg packet 2 packet  2 packet Oral PRN Keturah Perdomo MD        potassium, sodium  phosphates 280-160-250 mg packet 2 packet  2 packet Oral PRN Keturah Perdomo MD        potassium, sodium phosphates 280-160-250 mg packet 2 packet  2 packet Oral PRN Keturah Perdomo MD        promethazine tablet 25 mg  25 mg Oral Q6H PRN Ines Gillette PA-C        sodium chloride 0.9% flush 3 mL  3 mL Intravenous Q8H Ines Gillette PA-C   3 mL at 10/31/18 0518    vitamin D 1000 units tablet 1,000 Units  1,000 Units Oral Daily Ines Gillette PA-C   1,000 Units at 10/31/18 0858     Continuous Infusions:    Review of Systems   Constitutional: Negative for chills, fatigue and fever.   Respiratory: Negative for cough and shortness of breath.    Cardiovascular: Negative for chest pain and leg swelling.   Gastrointestinal: Negative for nausea and vomiting.   Musculoskeletal: Positive for gait problem. Negative for back pain and myalgias.   Skin: Negative for pallor, rash and wound.   Neurological: Positive for speech difficulty (intermittent), weakness and headaches. Negative for dizziness, tremors and facial asymmetry.   Psychiatric/Behavioral: Negative for agitation and confusion. The patient is not hyperactive.      Objective:     Vital Signs (Most Recent):  Temp: 98.8 °F (37.1 °C) (10/31/18 1605)  Pulse: 86 (10/31/18 1605)  Resp: 17 (10/31/18 1605)  BP: (!) 91/54 (10/31/18 1605)  SpO2: 95 % (10/31/18 1605) Vital Signs (24h Range):  Temp:  [97.6 °F (36.4 °C)-98.8 °F (37.1 °C)] 98.8 °F (37.1 °C)  Pulse:  [] 86  Resp:  [13-37] 17  SpO2:  [94 %-100 %] 95 %  BP: ()/(50-75) 91/54     Weight: 67 kg (147 lb 11.3 oz)  Body mass index is 28.85 kg/m².    Physical Exam   Constitutional: She is oriented to person, place, and time. She appears well-developed and well-nourished. No distress.   HENT:   Head: Normocephalic and atraumatic.   Right Ear: External ear normal.   Left Ear: External ear normal.   Nose: Nose normal.   Eyes: Conjunctivae and EOM are normal. Pupils are equal, round,  and reactive to light.   Neck: Normal range of motion. No thyromegaly present.   Cardiovascular: Intact distal pulses.   Pulmonary/Chest: Effort normal. No respiratory distress.   Musculoskeletal: Normal range of motion. She exhibits no deformity.   Neurological: She is oriented to person, place, and time.   Skin: Skin is warm and dry. She is not diaphoretic. No erythema.   Psychiatric:   Fluent conversation upon entering room, as conversation progresses, speech a little more delayed, speaks with clenched teeth at times       NEUROLOGICAL EXAMINATION:     MENTAL STATUS   Oriented to person, place, and time.   Level of consciousness: alert       Oriented to person, , place, year, month.      CRANIAL NERVES     CN III, IV, VI   Pupils are equal, round, and reactive to light.  Extraocular motions are normal.     CN V   Facial sensation intact.     CN VII   Facial expression full, symmetric.     CN VIII   CN VIII normal.     CN IX, X   CN IX normal.   CN X normal.     CN XI   Right sternocleidomastoid strength: weak  Left sternocleidomastoid strength: weak    CN XII   CN XII normal.     MOTOR EXAM        BUE 4/5 strength  Decreased BLE strength, but some improvement       Significant Labs: All pertinent lab results from the past 24 hours have been reviewed.    Significant Studies: I have reviewed all pertinent imaging results/findings within the past 24 hours.    Assessment and Plan:     * Altered mental status    47 yo female who presented as transfer from OSH for further workup of spells of transient neurologic symptoms. Extensive workup since onset with no noteable abnormal findings. Onset of episodes coincided with abrupt discontinuation of thyroid medication, but episodes have continued since it was resumed.    Recommendations:  - Episode not fully capture on EEG, but portion that was with no noteable epileptiform correlate  - Continue vEEG  - Continue holding home Topiramate in an effort to capture additional  even on EEG, no indication to start additional AED at this point  - Continued workup of other possible etiologies of events per primary team    Plan of care discussed with primary service, patient and  at bedside. Will continue to follow.      MAURO (obstructive sleep apnea)    - CPAP qHS     Migraines    - Reports history of migraines since childhood  - Continue home Amitriptyline 100 mg qHS, hold home TPM      Asthma    - On home singulair, montelukast, CPAP qHS     GERD (gastroesophageal reflux disease)    - Home pantoprazole      HLD (hyperlipidemia)    - Home Lipitor     Hypothyroidism due to Hashimoto's thyroiditis    - Home Levothyroxine 50 mcg daily     Prediabetes    - SSI coverage per primary         VTE Risk Mitigation (From admission, onward)        Ordered     enoxaparin injection 40 mg  Daily      10/28/18 1512     Place sequential compression device  Until discontinued      10/27/18 0515     Place HERBIE hose  Until discontinued      10/27/18 0515     IP VTE HIGH RISK PATIENT  Once      10/27/18 0515          María Moeller PA-C  Neurology-Epilepsy  [facility]  Staff: Dr. Sommer

## 2018-10-31 NOTE — PROGRESS NOTES
"[facility]  Neurocritical Care  Progress Note    Admit Date: 10/27/2018  Service Date: 10/31/2018  Length of Stay: 4    Subjective:     Chief Complaint: Altered mental status    History of Present Illness: Vivian Saini is a 47 y/o female with a h/o migraines (on Topamax), prediabetes on Metformin, GERD, asthma, HLD, hypothyroidism secondary to Hashimoto's, and MAURO who was admitted for hospital medicine service for evaluation of starring spells. 10/29 rapid response called for seizures / reported as starring spells with jerking movements of head and neck lasting > 30 mins and hence admitted to [facility].     On further review, extensive work-up over the past few months for this same presentation including imaging with CT Head, Brain MRI, multiple EEGs, and LP. No clear etiology yet. Upon arrival to [facility], patient is hemodynamically stable. Lab work in-patient has been grossly unremarkable. UA, urine toxicology screen at OSH wnl. TSH, Vit B12, and folate WNL. RPR non-reactive. Vitamin B1, B3, B6 levels all pending.        Hospital Course: 10/27 Consulted neurology, ordered EEG. Requesting MRI of head and Cand T spine - will need to clarify with or without contrast  10/27 Brain/Cervical/Thoracic spine MRI (10/27): "Right frontal lobe developmental venous anomaly." Anomaly does not explain symptoms. Cervical/thoracic spine unremarkable  10/29 rapid response called for seizures / reported as starring spells with jerking movements of head and neck lasting > 30 mins and hence admitted to [facility]. EEG monitoring in place   10/30 No events overnight. Stable for transfer to Hospital Medicine      No new subjective & objective note has been filed under this hospital service since the last note was generated.    Assessment/Plan:     Neuro   * Altered mental status    Transferred to [facility] for observation and continuous EEG monitoring. No events overnight. 10/29  10/30 Consult with epilepsy and patient appropriate " to transfer to Hospital Medicine to remain on continuous EEG monitoring.      Migraines    Continue topamax   Continue amitriptyline     Pulmonary   Asthma    - prn duo nebs   Continue nightly montelukast     Cardiac/Vascular   HLD (hyperlipidemia)    - statins      Endocrine   Hypothyroidism due to Hashimoto's thyroiditis    - continue home meds      Prediabetes    Hbg A1c  - SSI   Continue glucose monitoring     GI   GERD (gastroesophageal reflux disease)    - continue PPI      Other   MAURO (obstructive sleep apnea)    - cpap qhs ordered.  Pt home CPAP is nasal and hospital mask may be uncomfortable therefore patient uses prn as needed.  Upon evaluation, Denies SOB during rest           The patient is being Prophylaxed for:  Venous Thromboembolism with: Chemical  Stress Ulcer with: PPI  Ventilator Pneumonia with: not applicable    Activity Orders          Activity as tolerated starting at 10/30 1470        Full Code    Jennifer Kasper NP  Neurocritical Care  [facility]

## 2018-11-01 LAB
DIASTOLIC DYSFUNCTION: NO
ESTIMATED PA SYSTOLIC PRESSURE: 21.49
MITRAL VALVE MOBILITY: NORMAL
POCT GLUCOSE: 109 MG/DL (ref 70–110)
POCT GLUCOSE: 110 MG/DL (ref 70–110)
POCT GLUCOSE: 111 MG/DL (ref 70–110)
POCT GLUCOSE: 87 MG/DL (ref 70–110)
POCT GLUCOSE: 91 MG/DL (ref 70–110)
RETIRED EF AND QEF - SEE NOTES: 65 (ref 55–65)
TRICUSPID VALVE REGURGITATION: NORMAL

## 2018-11-01 PROCEDURE — 93306 TTE W/DOPPLER COMPLETE: CPT

## 2018-11-01 PROCEDURE — 63600175 PHARM REV CODE 636 W HCPCS: Performed by: INTERNAL MEDICINE

## 2018-11-01 PROCEDURE — A4216 STERILE WATER/SALINE, 10 ML: HCPCS | Performed by: PHYSICIAN ASSISTANT

## 2018-11-01 PROCEDURE — 95951 PR EEG MONITORING/VIDEORECORD: CPT | Mod: 26,52,, | Performed by: PSYCHIATRY & NEUROLOGY

## 2018-11-01 PROCEDURE — 99232 SBSQ HOSP IP/OBS MODERATE 35: CPT | Mod: ,,, | Performed by: INTERNAL MEDICINE

## 2018-11-01 PROCEDURE — 93010 ELECTROCARDIOGRAM REPORT: CPT | Mod: ,,, | Performed by: INTERNAL MEDICINE

## 2018-11-01 PROCEDURE — 20600001 HC STEP DOWN PRIVATE ROOM

## 2018-11-01 PROCEDURE — 99233 SBSQ HOSP IP/OBS HIGH 50: CPT | Mod: ,,, | Performed by: PSYCHIATRY & NEUROLOGY

## 2018-11-01 PROCEDURE — 95951 HC EEG MONITORING/VIDEO RECORD: CPT

## 2018-11-01 PROCEDURE — 93005 ELECTROCARDIOGRAM TRACING: CPT

## 2018-11-01 PROCEDURE — 93306 TTE W/DOPPLER COMPLETE: CPT | Mod: 26,,, | Performed by: INTERNAL MEDICINE

## 2018-11-01 PROCEDURE — 25000003 PHARM REV CODE 250: Performed by: PHYSICIAN ASSISTANT

## 2018-11-01 RX ORDER — ALBUTEROL SULFATE 90 UG/1
2 AEROSOL, METERED RESPIRATORY (INHALATION) EVERY 6 HOURS PRN
COMMUNITY

## 2018-11-01 RX ORDER — FLUTICASONE PROPIONATE AND SALMETEROL 250; 50 UG/1; UG/1
1 POWDER RESPIRATORY (INHALATION) 2 TIMES DAILY
COMMUNITY
End: 2023-07-05

## 2018-11-01 RX ADMIN — CETIRIZINE HYDROCHLORIDE 10 MG: 5 TABLET ORAL at 09:11

## 2018-11-01 RX ADMIN — DOCUSATE SODIUM 100 MG: 100 CAPSULE, LIQUID FILLED ORAL at 09:11

## 2018-11-01 RX ADMIN — LEVOTHYROXINE SODIUM 50 MCG: 50 TABLET ORAL at 05:11

## 2018-11-01 RX ADMIN — THERA TABS 1 TABLET: TAB at 09:11

## 2018-11-01 RX ADMIN — Medication 3 ML: at 09:11

## 2018-11-01 RX ADMIN — ASPIRIN 81 MG: 81 TABLET, COATED ORAL at 09:11

## 2018-11-01 RX ADMIN — Medication 3 ML: at 05:11

## 2018-11-01 RX ADMIN — ATORVASTATIN CALCIUM 20 MG: 20 TABLET, FILM COATED ORAL at 09:11

## 2018-11-01 RX ADMIN — AMITRIPTYLINE HYDROCHLORIDE 100 MG: 25 TABLET, FILM COATED ORAL at 09:11

## 2018-11-01 RX ADMIN — MONTELUKAST SODIUM 10 MG: 10 TABLET, FILM COATED ORAL at 09:11

## 2018-11-01 RX ADMIN — ENOXAPARIN SODIUM 40 MG: 100 INJECTION SUBCUTANEOUS at 06:11

## 2018-11-01 RX ADMIN — Medication 3 ML: at 03:11

## 2018-11-01 RX ADMIN — VITAMIN D, TAB 1000IU (100/BT) 1000 UNITS: 25 TAB at 09:11

## 2018-11-01 RX ADMIN — PANTOPRAZOLE SODIUM 40 MG: 40 TABLET, DELAYED RELEASE ORAL at 09:11

## 2018-11-01 NOTE — ASSESSMENT & PLAN NOTE
Self-resolving, 10-20 minute episodes of transient loss of consciousness. Suspect neurally mediated syncope, additionally considering cardiac syncope versus non-epileptiform spells.  - Personal history of palpitations and arrhythmia necessitating beta blocker use, no longer taking beta blocker after running out of Rx.   - Personal history of orthostatic dizziness and syncope in her youth  - EEG negative during episode on 11/1/18, low concern for seizures  - Imaging thus far: CT head, MRI brain, MRI C-spine, MRI L-spine shows small venous malformation that is reportedly unlikely to be related to her symptoms, otherwise normal  - UA, Utox, LP negative  - TSH and FT4 normal  - B1, B3, B6, B12, folate normal  - RPR negative    Plan:  - Echocardiogram  - ECG  - Telemetry  - Orthostatic vitals  - Carotid US  - will need outpatient tilt table testing

## 2018-11-01 NOTE — PROGRESS NOTES
..EMU SEIZURE EVENT NOTE  Time event started:10:55  Duration:30 min  Describe symptoms during event and post-ictal symptoms:pt had staring episode, non responsive during episode, pupils remained dilated, after 30 min, pt blinked eyes and became responsive  Who notified: Dr Amato and Dr Aguila Intervention:  Oxygen placed via NC  Vital signs taken q5min during event and post VS taken, capillary blood glucose checked. Standardized neurological assessment completed. Notified EMU team   Patient response: Neurological assessment back at baseline, VSS at this time. See flowsheets for more event information.

## 2018-11-01 NOTE — PROGRESS NOTES
"[facility] Hospital Medicine  Progress Note    Patient Name: Vivian Peterson  MRN: [id number]  Patient Class: IP- Inpatient   Admission Date: 10/27/2018  Length of Stay: 5 days  Attending Physician: Rubi Aguila MD  Primary Care Provider: Provider [facility] Medicine Team: [facility] HOSP MED G Rubi Aguila MD    Subjective:     Principal Problem:Altered mental status    HPI:  Patient is a 48 year old lady with a h/o migraines, prediabetes on Metformin, GERD, asthma, HLD, hypothyroidism secondary to Hashimoto's, and MAURO.  She was transferred from [facility] where she presented with L sided weakness, numbness, and "staring spells."   at bedside assists with HPI.  Patient began having episodes of L sided weakness, gait disturbance (L>R), tremors, "catatonic episodes," and syncope in February.  Patient was living in [place] at that time and had been taken off her thyroid medications by her PCP.  She was admitted to the hospital and was told she had thyroid storm.  On discharge she was restarted on her medication.  However, patient continued to have episodes.  She was seen by neurology, but her neurologist moved and she was lost to follow-up.  She has had four episodes since that time.  Her most recent episode was on September 22, when she presented to  with syncope and gait disturbance.  Her work-up, including MRI brain, CT head, and LP, was unremarkable.  She was discharged on a higher dose of Topamax (100mg qAM and 150mg qHS).  This evening she presented to OSH with abnormal gait and episodes of "staring off" when she is not able to communicate.  Episodes last about ten minutes.  She had one event while in the ER prior to transfer.  On exam, patient is oriented to situation, place, and time.  When asked her full name, she is unable to answer; however she was able to answer when asked what her first name was and what her last " "name was separately.  She denies chest pain, complains of chronic SOB secondary to asthma.  Denies dizziness, palpitations, fever/chills, N/V/D, abdominal pain.          Hospital Course:  10/27 Consulted neurology, ordered EEG. Requesting MRI of head and Cand T spine - will need to clarify with or without contrast  10/27 Brain/Cervical/Thoracic spine MRI (10/27): "Right frontal lobe developmental venous anomaly." Anomaly does not explain symptoms. Cervical/thoracic spine unremarkable  10/29 rapid response called for seizures / reported as starring spells with jerking movements of head and neck lasting > 30 mins and hence admitted to [facility]. EEG monitoring in place   10/30 No events overnight. Stable for transfer to Hospital Medicine       Interval History: Episode today with negative EEG, neuro thinks unlikely to be seizure. Reviewed history with the patient, she has a long history of severe tachycardia with HR >200 at times, previously treated with metoprolol, stopped due to running out of meds. History of orthostatic syncope as a young woman. Does have some intermittent palpitations.     Review of Systems   Constitutional: Negative for chills, fatigue and fever.   Respiratory: Negative for cough and shortness of breath.    Cardiovascular: Negative for chest pain and leg swelling.   Gastrointestinal: Negative for nausea and vomiting.   Genitourinary: Negative for dysuria and frequency.   Musculoskeletal: Negative for back pain and myalgias.   Skin: Negative for pallor, rash and wound.   Neurological: Positive for speech difficulty (slowed), weakness (intermittent) and headaches. Negative for dizziness, tremors and facial asymmetry.   Psychiatric/Behavioral: Negative for agitation and confusion. The patient is not hyperactive.      Objective:     Vital Signs (Most Recent):  Temp: 97.4 °F (36.3 °C) (11/01/18 0800)  Pulse: 93 (11/01/18 0800)  Resp: 16 (11/01/18 0800)  BP: 95/60 (11/01/18 0800)  SpO2: 97 % (11/01/18 0800) " Vital Signs (24h Range):  Temp:  [96.7 °F (35.9 °C)-99 °F (37.2 °C)] 97.4 °F (36.3 °C)  Pulse:  [75-93] 93  Resp:  [16-18] 16  SpO2:  [95 %-97 %] 97 %  BP: ()/(52-75) 95/60     Weight: 67 kg (147 lb 11.3 oz)  Body mass index is 28.85 kg/m².    Intake/Output Summary (Last 24 hours) at 11/1/2018 1347  Last data filed at 11/1/2018 0634  Gross per 24 hour   Intake 240 ml   Output 1 ml   Net 239 ml      Physical Exam   Constitutional: She is oriented to person, place, and time. She appears well-developed. No distress.   EEG leads in place   HENT:   Head: Normocephalic.   Mouth/Throat: Oropharynx is clear and moist.   Eyes: No scleral icterus.   Neck: No thyromegaly present.   Cardiovascular: Normal rate, regular rhythm and normal heart sounds.   Pulmonary/Chest: Effort normal and breath sounds normal.   Abdominal: Soft. Bowel sounds are normal. She exhibits no distension.   Musculoskeletal: Normal range of motion. She exhibits no edema.   Neurological: She is alert and oriented to person, place, and time.   Skin: Skin is warm and dry. No rash noted.       Significant Labs:   CBC:   Recent Labs   Lab 10/31/18  0545   WBC 5.25   HGB 12.8   HCT 39.6        CMP:   Recent Labs   Lab 10/31/18  0545      K 3.7   *   CO2 18*   GLU 93   BUN 17   CREATININE 0.9   CALCIUM 9.0   PROT 6.6   ALBUMIN 3.7   BILITOT 0.2   ALKPHOS 124   AST 17   ALT 25   ANIONGAP 8   EGFRNONAA >60.0     All pertinent labs within the past 24 hours have been reviewed.    Significant Imaging: I have reviewed all pertinent imaging results/findings within the past 24 hours.    Assessment/Plan:      * Altered mental status    Self-resolving, 10-20 minute episodes of transient loss of consciousness. Suspect neurally mediated syncope, additionally considering cardiac syncope versus non-epileptiform spells.  - Personal history of palpitations and arrhythmia necessitating beta blocker use, no longer taking beta blocker after running out of  Rx.   - Personal history of orthostatic dizziness and syncope in her youth  - EEG negative during episode on 11/1/18, low concern for seizures  - Imaging thus far: CT head, MRI brain, MRI C-spine, MRI L-spine shows small venous malformation that is reportedly unlikely to be related to her symptoms, otherwise normal  - UA, Utox, LP negative  - TSH and FT4 normal  - B1, B3, B6, B12, folate normal  - RPR negative    Plan:  - Echocardiogram  - ECG  - Telemetry  - Orthostatic vitals  - Carotid US  - will need outpatient tilt table testing     Migraines    Chronic, stable  - holding topiramate  - continue amitriptyline       Asthma    Chronic  - Continue montelukast     Prediabetes    Chronic, HbA1c 5.5%  - accuchecks and low dose aspart correction QACHS     MAURO (obstructive sleep apnea)    - CPAP qHS.       GERD (gastroesophageal reflux disease)    - continue pantoprazole       HLD (hyperlipidemia)    Chronic  - continue home statin       Hypothyroidism due to Hashimoto's thyroiditis    TSH and FT4 normal. Has history of thyroid nodule  - Continue levothyroxine 50mcg  - US thyroid       VTE Risk Mitigation (From admission, onward)        Ordered     enoxaparin injection 40 mg  Daily      10/28/18 1512     Place sequential compression device  Until discontinued      10/27/18 0515     Place HERBIE hose  Until discontinued      10/27/18 0515     IP VTE HIGH RISK PATIENT  Once      10/27/18 0515          Rubi Aguila M.D., M.P.H.  Department of Hospital Medicine  [facility]  (pager) [phone]  (spect) [phone]

## 2018-11-01 NOTE — SUBJECTIVE & OBJECTIVE
Interval History: Episode today with negative EEG, neuro thinks unlikely to be seizure. Reviewed history with the patient, she has a long history of severe tachycardia with HR >200 at times, previously treated with metoprolol, stopped due to running out of meds. History of orthostatic syncope as a young woman. Does have some intermittent palpitations.     Review of Systems   Constitutional: Negative for chills, fatigue and fever.   Respiratory: Negative for cough and shortness of breath.    Cardiovascular: Negative for chest pain and leg swelling.   Gastrointestinal: Negative for nausea and vomiting.   Genitourinary: Negative for dysuria and frequency.   Musculoskeletal: Negative for back pain and myalgias.   Skin: Negative for pallor, rash and wound.   Neurological: Positive for speech difficulty (slowed), weakness (intermittent) and headaches. Negative for dizziness, tremors and facial asymmetry.   Psychiatric/Behavioral: Negative for agitation and confusion. The patient is not hyperactive.      Objective:     Vital Signs (Most Recent):  Temp: 97.4 °F (36.3 °C) (11/01/18 0800)  Pulse: 93 (11/01/18 0800)  Resp: 16 (11/01/18 0800)  BP: 95/60 (11/01/18 0800)  SpO2: 97 % (11/01/18 0800) Vital Signs (24h Range):  Temp:  [96.7 °F (35.9 °C)-99 °F (37.2 °C)] 97.4 °F (36.3 °C)  Pulse:  [75-93] 93  Resp:  [16-18] 16  SpO2:  [95 %-97 %] 97 %  BP: ()/(52-75) 95/60     Weight: 67 kg (147 lb 11.3 oz)  Body mass index is 28.85 kg/m².    Intake/Output Summary (Last 24 hours) at 11/1/2018 1347  Last data filed at 11/1/2018 0634  Gross per 24 hour   Intake 240 ml   Output 1 ml   Net 239 ml      Physical Exam   Constitutional: She is oriented to person, place, and time. She appears well-developed. No distress.   EEG leads in place   HENT:   Head: Normocephalic.   Mouth/Throat: Oropharynx is clear and moist.   Eyes: No scleral icterus.   Neck: No thyromegaly present.   Cardiovascular: Normal rate, regular rhythm and normal  heart sounds.   Pulmonary/Chest: Effort normal and breath sounds normal.   Abdominal: Soft. Bowel sounds are normal. She exhibits no distension.   Musculoskeletal: Normal range of motion. She exhibits no edema.   Neurological: She is alert and oriented to person, place, and time.   Skin: Skin is warm and dry. No rash noted.       Significant Labs:   CBC:   Recent Labs   Lab 10/31/18  0545   WBC 5.25   HGB 12.8   HCT 39.6        CMP:   Recent Labs   Lab 10/31/18  0545      K 3.7   *   CO2 18*   GLU 93   BUN 17   CREATININE 0.9   CALCIUM 9.0   PROT 6.6   ALBUMIN 3.7   BILITOT 0.2   ALKPHOS 124   AST 17   ALT 25   ANIONGAP 8   EGFRNONAA >60.0     All pertinent labs within the past 24 hours have been reviewed.    Significant Imaging: I have reviewed all pertinent imaging results/findings within the past 24 hours.

## 2018-11-01 NOTE — HOSPITAL COURSE
"Ms. Peterson was admitted to Hospital Medicine after transfer from [facility] for evaluation of seizure-like activity. Neurology was consulted. She underwent MRI and CT which were negative for stroke, showed only "right frontal lobe developmental venous anomaly" on MRI which Neurology did not feel explained her symptoms. Endocrine and nutritional workup have been normal. Her TSH/FT4 are stable on her current dose of levothyroxine. US thyroid was normal. On 10/29, she experienced a seizure-like episode described as "staring spell with jerking movements of the head and neck lasting >30 minutes", rapid response was called, and she was transferred to the Neuro ICU. EEG was not recording during the episode. EEG was started and she experienced no further episodes in the ICU.    She was transferred back to Hospital Medicine on 10/30. On the floor, she suffered a recurrent episode of seizure-like activity, preceded by light-headedness. EEG showed no evidence of epileptiform activity. Neurology reassured the patient that her symptoms were not seizures. Additional review of her history revealed that her symptoms have often been precipitated by palpitations with rapid heart rate as well as light-headedness, and that she had a long history of orthostatic dizziness and syncope, as well as a history of tachyarrhythmia previously treated with metoprolol. Her orthostatic vitals were positive. Cardiac workup including ECG, echocardiogram, and telemetry were normal. She showed no evidence of dysrhythmia. US carotids showed mild stenosis bilaterally <40%, but no abnormality at the carotid bifurcation. She is already on a daily aspirin.     Given her frequent orthostatic syncope and positive orthostatic vitals, her episodes are highly suspected to be related to orthostasis and cerebral hypoxia in the setting of chronically low blood pressure. She was instructed to increase her oral fluid and salt intake in order to compensate for her low blood " pressure, with modest improvement in her orthostatic symptoms. She was started on midodrine, with excellent clinical response. Her telemetry in-house was normal, but she should have outpatient follow up with Cardiology for further clarification of her history of dysrhythmia and likely Holter or 30 day monitor to capture any episodes. She will additionally need outpatient tilt table testing. Her topirimate and amitriptyline were stopped at the patient's request over concern for contribution to her symptoms. She did not have any migraines while hospitalized.

## 2018-11-01 NOTE — PROCEDURES
DATE OF SERVICE:  10/31/2018    EPILEPSY MONITORING UNIT    EEG/VIDEO TELEMETRY REPORT    METHODOLOGY:  Electroencephalographic (EEG) is recorded with electrodes placed   according to the International 10-20 placement system.  Thirty Two (32) channels   of digital signal, including T1 and T2 electrodes, are simultaneously recorded   from the scalp and may also include EKG, EMG and/or eye movement monitors.    Recording band pass was 0.1 to 512 Hz.  Digital video recording of the patient   is simultaneously recorded with the EEG.  The patient is instructed to report   clinical symptoms which may occur during the recording session.  EEG and video   recording are stored and archived in digital format.  Activation procedures,   which include photic stimulation, hyperventilation and instructing patients to   perform simple tasks, are done in selected patients.    The EEG is displayed on a monitor screen and can be reformatted into different   montages for evaluation.  The entire recoding is submitted for computer-assisted   analysis to detect spike and electrographic seizure activity.  The entire   recording is visually reviewed, and the times identified by computer analysis as   being spikes or seizures are reviewed again.  Compressesed spectral analysis   (CSA) is also performed on the activity recorded from each individual channel.    This is displayed as a power display of frequencies from 0 to 30 Hz over time.    The CSA analysis is done and displayed continuously.  This is reviewed for   asymmetries in power between homologous areas of the scalp and for presence of   changes in power which can be seen when seizures occur.  Sections of suspected   abnormalities on the CSA are then compared with the original EEG recording.    [employer] software was also utilized in the review of this study.  This software   suite analyzes the EEG recording in multiple domains.  Coherence and rhythmicity   are computed to identify EEG  sections which may contain organized seizures.    Each channel undergoes analysis to detect presence of spike and sharp waves   which have special and morphological characteristics of epileptic activity.  The   routine EEG recording is converted from special into frequency domain.  This is   then displayed comparing homologous areas to identify areas of significant   asymmetry.  Algorithm to identify non-cortically generated artifact is used to   separate artifact from the EEG.    RECORDING TIMES:  The study begins on 10/31/2018 at 07:00 and ends on 11/01/2018   at 07:00.  There is a pause from 10/31/2018 at 13:28 until 13:56.  A total   recording duration is 23 hours and 23 minutes.    EEG FINDINGS:  This record contains medium amplitude mixed frequencies with   well-formed alpha and beta activity seen during wakefulness with good   variability.  There is abundant beta activity seen in the 15 to 22 Hz range at   times.  This obscures the alpha rhythm at many points during the record.  The   record is symmetrical.  There are no epileptiform discharges seen at any point.    There are no seizures seen at any point.  Qualitatively normal sleep was   captured.  None of the patient's typical push-button events were visualized or   captured during this study.  There were no events noted of any kind.  There were   no electrographic seizures.    INTERPRETATION:  Normal awake and asleep EMU EEG.  No events were captured.      NBB/IN  dd: 11/01/2018 15:18:18 (CDT)  td: 11/01/2018 15:31:40 (CDT)  Doc ID   #[id number]  Job ID #[id number]    CC:

## 2018-11-01 NOTE — PHARMACY MED REC
"Admission Medication Reconciliation - Pharmacy Consult Note    The home medication history was taken by Elizabeth Rosas PharmD.  Based on information gathered and subsequent review by the clinical pharmacist, the items below may need attention.    You may go to "Admission" then "Reconcile Home Medications" tabs to review and/or act upon these items.        No issues noted with the medication reconciliation.        Potential issues to be addressed PRIOR TO DISCHARGE  o Consider increasing statin dose to 40mg daily - hx of pre-diabetes, HDL (do have lipid panel here), family hx of HDL, MI, T2DM (currently on 20mg daily)  o Patient told me she was prescribed Advair (was taking as needed) and Proair for her asthma in addition to her Singulair - added these meds to her med rec - counseled patient on the importance of using Advair as a maintenance medication and not as needed  o Patient would like any medications she may need immediatly to be sent to [facility] as her pharmacy may be closed by the time she gets back to [place]     Please address this information as you see fit.  Feel free to contact us if you have any questions or require assistance.    Elizabeth Rosas PharmD  [id number]                .    .          "

## 2018-11-01 NOTE — ASSESSMENT & PLAN NOTE
49 yo female who presented as transfer from OSH for further workup of spells of transient neurologic symptoms. Extensive workup since onset with no noteable abnormal findings. Onset of episodes coincided with abrupt discontinuation of thyroid medication, but episodes have continued since it was resumed.    Recommendations:  - EEG showed no changes during the event  - Hold home Topiramate to capture events. Can restart at discharge.  - Continued workup of other possible etiologies of events per primary team. May need Neuropsych consult    Plan of care discussed with primary service, patient and  at bedside.

## 2018-11-01 NOTE — PROCEDURES
DATE OF SERVICE:  10/29/2018    ICU EEG/VIDEO MONITORING REPORT    METHODOLOGY:  Electroencephalographic (EEG) is recorded with electrodes placed   according to the International 10-20 placement system.  Thirty two (32) channels   of digital signal (sampling rate of 512/sec), including T1 and T2, were   simultaneously recorded from the scalp and may include EKG, EMG, and/or eye   monitors.  Recording band pass was 0.1 to 512 Hz.  Digital video recording of   the patient is simultaneously recorded with the EEG.  The patient is instructed   to report clinical symptoms which may occur during the recording session.  EEG   and video recording are stored and archived in digital format.  Activation   procedures, which include photic stimulation, hyperventilation and instructing   patients to perform simple tasks, are done in selected patients.    The EEG is displayed on a monitor screen and can be reviewed using different   montages.  Computer-assisted analysis is employed to detect spike and   electrographic seizure activity.   The entire record is submitted for computer   analysis.  The entire recording is visually reviewed, and the times identified   by computer analysis as being spikes or seizures are reviewed again.      Compressed spectral analysis (CSA) is also performed on the activity recorded   from each individual channel.  This is displayed as a power display of   frequencies from 0 to 30 Hz over time.   The CSA is reviewed looking for   asymmetries in power between homologous areas of the scalp, then compared with   the original EEG recording.      [employer] software was also utilized in the review of this study.  This software   suite analyzes the EEG recording in multiple domains.  Coherence and rhythmicity   are computed to identify EEG sections which may contain organized seizures.    Each channel undergoes analysis to detect the presence of spike and sharp waves   which have special and morphological  characteristics of epileptic activity.  The   routine EEG recording is converted from special into frequency domain.  This is   then displayed comparing homologous areas to identify areas of significant   asymmetry.  Algorithm to identify non-cortically generated artifact is used to   separate artifact from the EEG.      RECORDING TIMES:  The study begins on October 29th at 10:08 and ends on October 31st at 07:00.    Total duration of the record is 44 hours and 18 minutes.    EEG FINDINGS:  This record is being undertaken to capture intermittent episodes,   which are indeterminate whether they are epileptic or nonepileptic.  The   patient is in the ICU during this portion of the record.    The record consists of medium amplitude, mixed frequency activity with primarily   alpha and beta frequencies seen during wakefulness.  There is 15 to 20 Hz beta   activity seen commonly in both anterior and posterior background.  There is a 10   Hz posterior dominant rhythm in the occipital regions, which does emerge at   times.  There is good fluctuation and variability of the waking record with   abundant movement artifact as well.  Sleeping rhythms are captured with sleep   spindles and N3 sleep with delta activity and high amplitude alpha activity   superimposed.    There is good variability throughout the entire record.    There are no epileptiform discharges or seizures captured at any point.  The   patient's typical spells do not occur during this portion of the record.    INTERPRETATION:  Normal awake and asleep EEG during ICU video monitoring to   capture typical events.  Typical events were not seen during this portion.      NARAYAN  dd: 11/01/2018 11:42:42 (CDT)  td: 11/01/2018 11:53:55 (CDT)  Doc ID   #[id number]  Job ID #[id number]    CC:

## 2018-11-01 NOTE — PLAN OF CARE
"Problem: Patient Care Overview  Goal: Plan of Care Review  PMH: DM, GERD,asthma, HLD, MAURO, hypothyroidism, migraines, thyroid storm    10/27: ED with AMS, abnormal gait, "seziure like" episodes   10/29: Core call to SICU for seizure like episode, continuous EEG  10/30: CTA cancelled per Neuro. D/C NS @100ml/hr.      Outcome: Ongoing (interventions implemented as appropriate)  Pt tolerated meds and meals today,  remained at bedside all day, pt had episode today, see event note, hospital medicine notified( Dr Aguila) that events are not seizure related per Dr Amato neuro) , new orders written per hospital medicine, tele applied per orders, vss no distress      "

## 2018-11-01 NOTE — ASSESSMENT & PLAN NOTE
- Reports history of migraines since childhood  - Continue home Amitriptyline 100 mg qHS,   Restart Topamax at discharge

## 2018-11-01 NOTE — PLAN OF CARE
CM met with patient and spouse for discharge planning.  Patient states she has a hard time walking sometimes.  She states she has 5 to 7 steps at home to climb to get into the house.  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  She states that when she walks for a long distance then her legs start giving out on her and then she has a hard time getting the words out that she is trying to say.  Patient states she could use a walker.  Would like to have PT evaluate patient for a rollator.

## 2018-11-01 NOTE — SUBJECTIVE & OBJECTIVE
Interval History: No acute events overnight.   Had an event in the morning where she started hyperventilating, biting down on her tongue and clenching her jaw. Vitals were stable during the whole episode. Lasted 10-15 mins.  No EEG changes noted during the episode.    Current Facility-Administered Medications   Medication Dose Route Frequency Provider Last Rate Last Dose    acetaminophen tablet 650 mg  650 mg Oral Q4H PRN Ines Gillette PA-C   650 mg at 10/28/18 2154    albuterol-ipratropium 2.5 mg-0.5 mg/3 mL nebulizer solution 3 mL  3 mL Nebulization Q4H PRN Ines Gillette PA-C        amitriptyline tablet 100 mg  100 mg Oral QHS Ines Gillette PA-C   100 mg at 10/31/18 2132    aspirin EC tablet 81 mg  81 mg Oral Daily Ines Gillette PA-C   81 mg at 11/01/18 0957    atorvastatin tablet 20 mg  20 mg Oral QHS Ines Gillette PA-C   20 mg at 10/31/18 2133    cetirizine tablet 10 mg  10 mg Oral Daily Ines Gillette PA-C   10 mg at 11/01/18 0957    dextrose 50% injection 12.5 g  12.5 g Intravenous PRN Ines Gillette PA-C        dextrose 50% injection 25 g  25 g Intravenous PRN Ines Gillette PA-C        docusate sodium capsule 100 mg  100 mg Oral BID Ines Gillette PA-C   100 mg at 11/01/18 0957    enoxaparin injection 40 mg  40 mg Subcutaneous Daily Elaina Dillon MD   40 mg at 10/31/18 1750    glucagon (human recombinant) injection 1 mg  1 mg Intramuscular PRN Ines Gillette PA-C        glucose chewable tablet 16 g  16 g Oral PRN Ines Gillette PA-C        glucose chewable tablet 24 g  24 g Oral PRN Ines Gillette PA-C        insulin aspart U-100 pen 0-5 Units  0-5 Units Subcutaneous QID (AC + HS) PRN Ines Gillette PA-C        levothyroxine tablet 50 mcg  50 mcg Oral Before breakfast Ines Gillette PA-C   50 mcg at 11/01/18 0546    loperamide capsule 2 mg  2 mg Oral PRN Ines Gillette PA-C        monteluayahst  tablet 10 mg  10 mg Oral QHS Ines Gillette PA-C   10 mg at 10/31/18 2134    multivitamin tablet 1 tablet  1 tablet Oral Daily Ines Gillette PA-C   1 tablet at 11/01/18 0958    ondansetron disintegrating tablet 8 mg  8 mg Oral Q8H PRN Ines Gillette PA-C        pantoprazole EC tablet 40 mg  40 mg Oral Daily Ines Gillette PA-C   40 mg at 11/01/18 0958    polyethylene glycol packet 17 g  17 g Oral TID PRN Ines Gillette PA-C        promethazine tablet 25 mg  25 mg Oral Q6H PRN Ines Gillette PA-C        sodium chloride 0.9% flush 3 mL  3 mL Intravenous Q8H Ines Gillette PA-C   3 mL at 11/01/18 0546    vitamin D 1000 units tablet 1,000 Units  1,000 Units Oral Daily Ines Gillette PA-C   1,000 Units at 11/01/18 0958     Continuous Infusions:    Review of Systems   Constitutional: Negative for chills and fever.   HENT: Negative for trouble swallowing.    Eyes: Negative for visual disturbance.   Respiratory: Negative for shortness of breath.    Cardiovascular: Negative for chest pain.   Neurological: Positive for speech difficulty. Negative for dizziness and weakness.     Objective:     Vital Signs (Most Recent):  Temp: 97.4 °F (36.3 °C) (11/01/18 0800)  Pulse: 93 (11/01/18 0800)  Resp: 16 (11/01/18 0800)  BP: 95/60 (11/01/18 0800)  SpO2: 97 % (11/01/18 0800) Vital Signs (24h Range):  Temp:  [96.7 °F (35.9 °C)-99 °F (37.2 °C)] 97.4 °F (36.3 °C)  Pulse:  [86-93] 93  Resp:  [16-18] 16  SpO2:  [95 %-97 %] 97 %  BP: ()/(52-67) 95/60     Weight: 67 kg (147 lb 11.3 oz)  Body mass index is 28.85 kg/m².    Physical Exam   Constitutional: No distress.   Eyes: EOM are normal. Pupils are equal, round, and reactive to light.   Neurological: She has a normal Finger-Nose-Finger Test.   Reflex Scores:       Bicep reflexes are 1+ on the right side and 1+ on the left side.       Brachioradialis reflexes are 1+ on the right side and 1+ on the left side.       Patellar reflexes  are 1+ on the right side and 1+ on the left side.       Achilles reflexes are 1+ on the right side and 1+ on the left side.  Nursing note and vitals reviewed.      NEUROLOGICAL EXAMINATION:     MENTAL STATUS   Oriented to person.   Oriented to place.   Oriented to time.   Level of consciousness: alert    CRANIAL NERVES     CN III, IV, VI   Pupils are equal, round, and reactive to light.  Extraocular motions are normal.     CN VII   Facial expression full, symmetric.     CN XII   CN XII normal.     MOTOR EXAM        Strength 5/5 in all four extremities.     REFLEXES     Reflexes   Right brachioradialis: 1+  Left brachioradialis: 1+  Right biceps: 1+  Left biceps: 1+  Right patellar: 1+  Left patellar: 1+  Right achilles: 1+  Left achilles: 1+    SENSORY EXAM   Light touch normal.     GAIT AND COORDINATION      Coordination   Finger to nose coordination: normal    Tremor   Resting tremor: absent      Significant Labs:   CBC:   Recent Labs   Lab 10/31/18  0545   WBC 5.25   HGB 12.8   HCT 39.6        CMP:   Recent Labs   Lab 10/31/18  0545   GLU 93      K 3.7   *   CO2 18*   BUN 17   CREATININE 0.9   CALCIUM 9.0   MG 2.2   PROT 6.6   ALBUMIN 3.7   BILITOT 0.2   ALKPHOS 124   AST 17   ALT 25   ANIONGAP 8   EGFRNONAA >60.0     All pertinent lab results from the past 24 hours have been reviewed.    Significant Studies: I have reviewed all pertinent imaging results/findings within the past 24 hours.

## 2018-11-01 NOTE — PROGRESS NOTES
[facility]  Neurology-Epilepsy  Progress Note    Patient Name: Vivian Peterson  MRN: [id number]  Admission Date: 10/27/2018  Hospital Length of Stay: 5 days  Code Status: Full Code   Attending Provider: Rubi Aguila MD  Primary Care Physician: Provider Notinsystem   Principal Problem:Altered mental status    Subjective:     Hospital Course:   10/27: [place] [facility] [place]  10/28: no acute events  10/29: Episode am, transferred to [facility] team. No noted epileptiform activity during end of episode (eeg not running for initial part of episode)  10/30: No further episodes since transfer to ICU, planning for stepdown to floor for further workup. Recommend holding Topamax.  10/31: No episodes, stepped down to floor. Off Topamax.  11/1: Has an event today where she was hyperventilating. Was biting down on her tongue. No tonic-clonic movements. Vitals stable.    Interval History: No acute events overnight.   Had an event in the morning where she started hyperventilating, biting down on her tongue and clenching her jaw. Vitals were stable during the whole episode. Lasted 10-15 mins.  No EEG changes noted during the episode.    Current Facility-Administered Medications   Medication Dose Route Frequency Provider Last Rate Last Dose    acetaminophen tablet 650 mg  650 mg Oral Q4H PRN Ines Gillette PA-C   650 mg at 10/28/18 2154    albuterol-ipratropium 2.5 mg-0.5 mg/3 mL nebulizer solution 3 mL  3 mL Nebulization Q4H PRN Ines Gillette PA-C        amitriptyline tablet 100 mg  100 mg Oral QHS Ines Gillette PA-C   100 mg at 10/31/18 2132    aspirin EC tablet 81 mg  81 mg Oral Daily Ines Gillette PA-C   81 mg at 11/01/18 0957    atorvastatin tablet 20 mg  20 mg Oral QHS Ines Gillette PA-C   20 mg at 10/31/18 2133    cetirizine tablet 10 mg  10 mg Oral Daily Ines Gillette PA-C   10 mg at 11/01/18 0957    dextrose 50% injection 12.5 g  12.5 g Intravenous PRN  Ines Gillette PA-C        dextrose 50% injection 25 g  25 g Intravenous PRN Ines Gillette PA-C        docusate sodium capsule 100 mg  100 mg Oral BID Ines Gillette PA-C   100 mg at 11/01/18 0957    enoxaparin injection 40 mg  40 mg Subcutaneous Daily Elaina Dillon MD   40 mg at 10/31/18 1750    glucagon (human recombinant) injection 1 mg  1 mg Intramuscular PRN Ines Gillette PA-C        glucose chewable tablet 16 g  16 g Oral PRN Ines Gillette PA-C        glucose chewable tablet 24 g  24 g Oral PRN Ines Gillette PA-C        insulin aspart U-100 pen 0-5 Units  0-5 Units Subcutaneous QID (AC + HS) PRN Ines Gillette PA-C        levothyroxine tablet 50 mcg  50 mcg Oral Before breakfast Ines Gillette PA-C   50 mcg at 11/01/18 0546    loperamide capsule 2 mg  2 mg Oral PRN Ines Gillette PA-C        montelukast tablet 10 mg  10 mg Oral QHS Ines Gillette PA-C   10 mg at 10/31/18 2134    multivitamin tablet 1 tablet  1 tablet Oral Daily Ines Gillette PA-C   1 tablet at 11/01/18 0958    ondansetron disintegrating tablet 8 mg  8 mg Oral Q8H PRN Ines Gillette PA-C        pantoprazole EC tablet 40 mg  40 mg Oral Daily Ines Gillette PA-C   40 mg at 11/01/18 0958    polyethylene glycol packet 17 g  17 g Oral TID PRN Ines Gillette PA-C        promethazine tablet 25 mg  25 mg Oral Q6H PRN Ines Gillette PA-C        sodium chloride 0.9% flush 3 mL  3 mL Intravenous Q8H Ines Gillette PA-C   3 mL at 11/01/18 0546    vitamin D 1000 units tablet 1,000 Units  1,000 Units Oral Daily Ines Gillette PA-C   1,000 Units at 11/01/18 0958     Continuous Infusions:    Review of Systems   Constitutional: Negative for chills and fever.   HENT: Negative for trouble swallowing.    Eyes: Negative for visual disturbance.   Respiratory: Negative for shortness of breath.    Cardiovascular: Negative for chest pain.    Neurological: Positive for speech difficulty. Negative for dizziness and weakness.     Objective:     Vital Signs (Most Recent):  Temp: 97.4 °F (36.3 °C) (11/01/18 0800)  Pulse: 93 (11/01/18 0800)  Resp: 16 (11/01/18 0800)  BP: 95/60 (11/01/18 0800)  SpO2: 97 % (11/01/18 0800) Vital Signs (24h Range):  Temp:  [96.7 °F (35.9 °C)-99 °F (37.2 °C)] 97.4 °F (36.3 °C)  Pulse:  [86-93] 93  Resp:  [16-18] 16  SpO2:  [95 %-97 %] 97 %  BP: ()/(52-67) 95/60     Weight: 67 kg (147 lb 11.3 oz)  Body mass index is 28.85 kg/m².    Physical Exam   Constitutional: No distress.   Eyes: EOM are normal. Pupils are equal, round, and reactive to light.   Neurological: She has a normal Finger-Nose-Finger Test.   Reflex Scores:       Bicep reflexes are 1+ on the right side and 1+ on the left side.       Brachioradialis reflexes are 1+ on the right side and 1+ on the left side.       Patellar reflexes are 1+ on the right side and 1+ on the left side.       Achilles reflexes are 1+ on the right side and 1+ on the left side.  Nursing note and vitals reviewed.      NEUROLOGICAL EXAMINATION:     MENTAL STATUS   Oriented to person.   Oriented to place.   Oriented to time.   Level of consciousness: alert    CRANIAL NERVES     CN III, IV, VI   Pupils are equal, round, and reactive to light.  Extraocular motions are normal.     CN VII   Facial expression full, symmetric.     CN XII   CN XII normal.     MOTOR EXAM        Strength 5/5 in all four extremities.     REFLEXES     Reflexes   Right brachioradialis: 1+  Left brachioradialis: 1+  Right biceps: 1+  Left biceps: 1+  Right patellar: 1+  Left patellar: 1+  Right achilles: 1+  Left achilles: 1+    SENSORY EXAM   Light touch normal.     GAIT AND COORDINATION      Coordination   Finger to nose coordination: normal    Tremor   Resting tremor: absent      Significant Labs:   CBC:   Recent Labs   Lab 10/31/18  0545   WBC 5.25   HGB 12.8   HCT 39.6        CMP:   Recent Labs   Lab  10/31/18  0545   GLU 93      K 3.7   *   CO2 18*   BUN 17   CREATININE 0.9   CALCIUM 9.0   MG 2.2   PROT 6.6   ALBUMIN 3.7   BILITOT 0.2   ALKPHOS 124   AST 17   ALT 25   ANIONGAP 8   EGFRNONAA >60.0     All pertinent lab results from the past 24 hours have been reviewed.    Significant Studies: I have reviewed all pertinent imaging results/findings within the past 24 hours.    Assessment and Plan:     * Altered mental status    47 yo female who presented as transfer from OSH for further workup of spells of transient neurologic symptoms. Extensive workup since onset with no noteable abnormal findings. Onset of episodes coincided with abrupt discontinuation of thyroid medication, but episodes have continued since it was resumed.    Recommendations:  - EEG showed no changes during the event  - Hold home Topiramate to capture events. Can restart at discharge.  - Continued workup of other possible etiologies of events per primary team. May need Neuropsych consult    Plan of care discussed with primary service, patient and  at bedside.      MAURO (obstructive sleep apnea)    - CPAP qHS     Migraines    - Reports history of migraines since childhood  - Continue home Amitriptyline 100 mg qHS,   Restart Topamax at discharge     Asthma    - On home singulair, montelukast, CPAP qHS     GERD (gastroesophageal reflux disease)    - Home pantoprazole      HLD (hyperlipidemia)    - Home Lipitor     Hypothyroidism due to Hashimoto's thyroiditis    - Home Levothyroxine 50 mcg daily     Prediabetes    - SSI coverage per primary         VTE Risk Mitigation (From admission, onward)        Ordered     enoxaparin injection 40 mg  Daily      10/28/18 1512     Place sequential compression device  Until discontinued      10/27/18 0515     Place HERBIE hose  Until discontinued      10/27/18 0515     IP VTE HIGH RISK PATIENT  Once      10/27/18 0515        Will sign off. Please call #[phone] with any questions.    Praneeth Landis  MD Pranav  Neurology-Epilepsy  [facility]

## 2018-11-02 LAB
ALBUMIN SERPL BCP-MCNC: 3.9 G/DL
ALP SERPL-CCNC: 129 U/L
ALT SERPL W/O P-5'-P-CCNC: 103 U/L
ANION GAP SERPL CALC-SCNC: 8 MMOL/L
AST SERPL-CCNC: 53 U/L
BASOPHILS # BLD AUTO: 0.04 K/UL
BASOPHILS NFR BLD: 0.7 %
BILIRUB SERPL-MCNC: 0.3 MG/DL
BUN SERPL-MCNC: 18 MG/DL
CALCIUM SERPL-MCNC: 9.3 MG/DL
CHLORIDE SERPL-SCNC: 109 MMOL/L
CO2 SERPL-SCNC: 21 MMOL/L
CREAT SERPL-MCNC: 0.8 MG/DL
DIFFERENTIAL METHOD: ABNORMAL
EOSINOPHIL # BLD AUTO: 0 K/UL
EOSINOPHIL NFR BLD: 0.5 %
ERYTHROCYTE [DISTWIDTH] IN BLOOD BY AUTOMATED COUNT: 13 %
EST. GFR  (AFRICAN AMERICAN): >60 ML/MIN/1.73 M^2
EST. GFR  (NON AFRICAN AMERICAN): >60 ML/MIN/1.73 M^2
GLUCOSE SERPL-MCNC: 124 MG/DL
HCT VFR BLD AUTO: 39.3 %
HGB BLD-MCNC: 13.6 G/DL
IMM GRANULOCYTES # BLD AUTO: 0.07 K/UL
IMM GRANULOCYTES NFR BLD AUTO: 1.3 %
LYMPHOCYTES # BLD AUTO: 1.9 K/UL
LYMPHOCYTES NFR BLD: 33.8 %
MAGNESIUM SERPL-MCNC: 2.2 MG/DL
MCH RBC QN AUTO: 29.7 PG
MCHC RBC AUTO-ENTMCNC: 34.6 G/DL
MCV RBC AUTO: 86 FL
MONOCYTES # BLD AUTO: 0.5 K/UL
MONOCYTES NFR BLD: 8.4 %
NEUTROPHILS # BLD AUTO: 3 K/UL
NEUTROPHILS NFR BLD: 55.3 %
NRBC BLD-RTO: 0 /100 WBC
PHOSPHATE SERPL-MCNC: 3 MG/DL
PLATELET # BLD AUTO: 161 K/UL
PMV BLD AUTO: 11.1 FL
POCT GLUCOSE: 77 MG/DL (ref 70–110)
POCT GLUCOSE: 90 MG/DL (ref 70–110)
POTASSIUM SERPL-SCNC: 4.1 MMOL/L
PROT SERPL-MCNC: 7 G/DL
RBC # BLD AUTO: 4.58 M/UL
SODIUM SERPL-SCNC: 138 MMOL/L
WBC # BLD AUTO: 5.5 K/UL

## 2018-11-02 PROCEDURE — 97530 THERAPEUTIC ACTIVITIES: CPT

## 2018-11-02 PROCEDURE — 63600175 PHARM REV CODE 636 W HCPCS: Performed by: INTERNAL MEDICINE

## 2018-11-02 PROCEDURE — 83735 ASSAY OF MAGNESIUM: CPT

## 2018-11-02 PROCEDURE — 36415 COLL VENOUS BLD VENIPUNCTURE: CPT

## 2018-11-02 PROCEDURE — 85025 COMPLETE CBC W/AUTO DIFF WBC: CPT

## 2018-11-02 PROCEDURE — 11000001 HC ACUTE MED/SURG PRIVATE ROOM

## 2018-11-02 PROCEDURE — 25000003 PHARM REV CODE 250: Performed by: PHYSICIAN ASSISTANT

## 2018-11-02 PROCEDURE — 99232 SBSQ HOSP IP/OBS MODERATE 35: CPT | Mod: ,,, | Performed by: INTERNAL MEDICINE

## 2018-11-02 PROCEDURE — 97535 SELF CARE MNGMENT TRAINING: CPT

## 2018-11-02 PROCEDURE — A4216 STERILE WATER/SALINE, 10 ML: HCPCS | Performed by: PHYSICIAN ASSISTANT

## 2018-11-02 PROCEDURE — 97164 PT RE-EVAL EST PLAN CARE: CPT

## 2018-11-02 PROCEDURE — 97116 GAIT TRAINING THERAPY: CPT

## 2018-11-02 PROCEDURE — 84100 ASSAY OF PHOSPHORUS: CPT

## 2018-11-02 PROCEDURE — 80053 COMPREHEN METABOLIC PANEL: CPT

## 2018-11-02 PROCEDURE — 97168 OT RE-EVAL EST PLAN CARE: CPT

## 2018-11-02 RX ADMIN — Medication 3 ML: at 03:11

## 2018-11-02 RX ADMIN — CETIRIZINE HYDROCHLORIDE 10 MG: 5 TABLET ORAL at 09:11

## 2018-11-02 RX ADMIN — Medication 3 ML: at 07:11

## 2018-11-02 RX ADMIN — DOCUSATE SODIUM 100 MG: 100 CAPSULE, LIQUID FILLED ORAL at 10:11

## 2018-11-02 RX ADMIN — VITAMIN D, TAB 1000IU (100/BT) 1000 UNITS: 25 TAB at 11:11

## 2018-11-02 RX ADMIN — ATORVASTATIN CALCIUM 20 MG: 20 TABLET, FILM COATED ORAL at 10:11

## 2018-11-02 RX ADMIN — LEVOTHYROXINE SODIUM 50 MCG: 50 TABLET ORAL at 07:11

## 2018-11-02 RX ADMIN — Medication 3 ML: at 10:11

## 2018-11-02 RX ADMIN — ASPIRIN 81 MG: 81 TABLET, COATED ORAL at 09:11

## 2018-11-02 RX ADMIN — MONTELUKAST SODIUM 10 MG: 10 TABLET, FILM COATED ORAL at 10:11

## 2018-11-02 RX ADMIN — THERA TABS 1 TABLET: TAB at 09:11

## 2018-11-02 RX ADMIN — PANTOPRAZOLE SODIUM 40 MG: 40 TABLET, DELAYED RELEASE ORAL at 09:11

## 2018-11-02 RX ADMIN — ENOXAPARIN SODIUM 40 MG: 100 INJECTION SUBCUTANEOUS at 05:11

## 2018-11-02 NOTE — PLAN OF CARE
11/02/18 1645   Discharge Reassessment   Assessment Type Discharge Planning Reassessment   Do you have any problems affording any of your prescribed medications? No   Discharge Plan A Home with family   Discharge Plan B Home Health   Can the patient answer the patient profile reliably? Yes, cognitively intact   How does the patient rate their overall health at the present time? Poor   Describe the patient's ability to walk at the present time. Walks with the help of equipment   How often would a person be available to care for the patient? Whenever needed   Number of comorbid conditions (as recorded on the chart) Three   During the past month, has the patient often been bothered by feeling down, depressed or hopeless? No   During the past month, has the patient often been bothered by little interest or pleasure in doing things? No

## 2018-11-02 NOTE — ASSESSMENT & PLAN NOTE
Self-resolving, 10-20 minute episodes of transient loss of consciousness. Suspect neurally mediated syncope, additionally considering cardiac syncope versus non-epileptiform spells. Personal history of palpitations and arrhythmia necessitating beta blocker use, no longer taking beta blocker after running out of Rx. Personal history of orthostatic dizziness and syncope.  - Orthostatic vitals 11/1 positive  - encouraged patient to increase fluid intake, sodium  - EEG negative during episode on 11/1/18, low concern for seizures  - Imaging thus far: CT head, MRI brain, MRI C-spine, MRI L-spine shows small venous malformation that is reportedly unlikely to be related to her symptoms, otherwise normal  - UA, Utox, LP negative  - TSH and FT4 normal  - B1, B3, B6, B12, folate normal  - RPR negative  - Echocardiogram normal  - ECG with nonspecific t wave changes  - Telemetry  - Carotid US pending  - will need outpatient tilt table testing  - HOLD amitriptyline

## 2018-11-02 NOTE — PROGRESS NOTES
Spoke with Dr. DONY Gann about pt BP 90/50, pt asymptomatic. Instructed to continue to monitor pt as long as she remains asymptomatic. Will continue to monitor.

## 2018-11-02 NOTE — ASSESSMENT & PLAN NOTE
Chronic, stable  - holding topiramate, patient would like to defer restart to outpatient  - hold amitriptyline

## 2018-11-02 NOTE — PLAN OF CARE
"Problem: Patient Care Overview  Goal: Plan of Care Review  PMH: DM, GERD,asthma, HLD, MAURO, hypothyroidism, migraines, thyroid storm    10/27: ED with AMS, abnormal gait, "seziure like" episodes   10/29: Core call to SICU for seizure like episode, continuous EEG  10/30: CTA cancelled per Neuro. D/C NS @100ml/hr.      Outcome: Ongoing (interventions implemented as appropriate)  Pt remains free from and injury of falls during shift. Awake, alert, and oriented x4. Vital signs stable. No signs of acute distress noted at this time. Bed in lowest position, wheels locked, and bed alarm on. Call light in reach. Will continue to monitor        "

## 2018-11-02 NOTE — PT/OT/SLP RE-EVAL
Physical Therapy Re-evaluation    Patient Name:  Vivian Peterson   MRN:  [id number]    Recommendations:     Discharge Recommendations:  outpatient PT, outpatient OT (family support)  Discharge Equipment Recommendations: walker, rolling   Barriers to discharge: None    Assessment:     Vivian Peterson is a 48 y.o. female admitted with a medical diagnosis of Altered mental status.  She presents with the following impairments/functional limitations:  weakness, gait instability, impaired endurance, impaired balance, decreased lower extremity function, impaired functional mobilty. Pt performed bed mobility S and transfers CGA with RW. Pt amb ~50ft CGA with RW, vc's for AD management and sequencing. Pt will benefit from skilled PT to improve deficits and increase overall functional mobility.     Rehab Prognosis:  Good; patient would benefit from acute skilled PT services to address these deficits and reach maximum level of function.      Recent Surgery: * No surgery found *      Plan:     During this hospitalization, patient to be seen 3 x/week to address the above listed problems via gait training, therapeutic activities, therapeutic exercises, neuromuscular re-education  · Plan of Care Expires:  12/02/18   Plan of Care Reviewed with: patient, spouse    Subjective     Communicated with RN prior to session.  Patient found supine in bed and  present upon PT entry to room, agreeable to evaluation.      Chief Complaint: decreased mobility  Patient comments/goals: return home   Pain/Comfort:  · Pain Rating 1: 0/10  · Pain Rating Post-Intervention 1: 0/10    Patients cultural, spiritual, Church conflicts given the current situation:        Objective:     Patient found with: telemetry     General Precautions: Standard, fall, seizure   Orthopedic Precautions:N/A   Braces: N/A     Exams:  · Cognitive Exam:  Patient is oriented to Person, Place, Time and Situation  · Gross Motor Coordination:  WFL  · Postural Exam:  Patient  presented with the following abnormalities:    · -       No postural abnormalities identified  · Sensation:    · -       Intact  light/touch B LE  · Skin Integrity/Edema:      · -       Skin integrity: Visible skin intact  · RLE ROM: WFL  · RLE Strength: WFL  · LLE ROM: WFL  · LLE Strength: WFL    Functional Mobility:  Bed Mobility:     · Supine to Sit: supervision  · Sit to Supine: supervision    Transfers:     · Sit to Stand:  contact guard assistance with rolling walker    Gait: ~50ft CGA with RW, vc's for AD management and sequencing    Stairs:  Pt ascended/descended 6 stair(s) with No Assistive Device with left handrail and R HHA from  with Contact Guard Assistance.     AM-PAC 6 CLICK MOBILITY  Total Score:19       Therapeutic Activities and Exercises:  Pt sat EOB with S.  Pt and  educated on:  -role of PT/POC  -safety with mobility  -importance of OOB activity  -use of RW  -benefits of OP PT  -safety with stair negotiation  Pt safe to amb with RW with RN staff.     Patient left HOB elevated with all lines intact, call button in reach, RN notified and  present.    GOALS:   Multidisciplinary Problems     Physical Therapy Goals        Problem: Physical Therapy Goal    Goal Priority Disciplines Outcome Goal Variances Interventions   Physical Therapy Goal     PT, PT/OT Ongoing (interventions implemented as appropriate)     Description:  Goals to be met by: 2018     Patient will increase functional independence with mobility by performin. Supine to sit with Modified [place]  2. Sit to supine with Modified [place]  3. Sit to stand transfer with Supervision  4. Gait  x 200 feet with Supervision with or without appropriate AD.   5. Ascend/descend 6 stair with bilateral Handrails Supervision.   6. Lower extremity exercise program x15 reps per handout, with supervision                      History:     Past Medical History:   Diagnosis Date    Asthma     GERD  (gastroesophageal reflux disease)     HLD (hyperlipidemia)     Hypothyroidism due to Hashimoto's thyroiditis     Migraines     MAURO (obstructive sleep apnea)     Prediabetes        Past Surgical History:   Procedure Laterality Date    SHOULDER SURGERY Left        Time Tracking:     PT Received On: 11/02/18  PT Start Time: 0849     PT Stop Time: 0917  PT Total Time (min): 28 min     Billable Minutes: Re-eval 10 and Gait Training 18      MARY RUSS, PT  11/02/2018

## 2018-11-02 NOTE — PT/OT/SLP RE-EVAL
Occupational Therapy   Re-evaluation    Name: Vivian Peterson  MRN: [id number]  Admitting Diagnosis:  Altered mental status      Recommendations:     Discharge Recommendations: outpatient OT, outpatient PT(and family assist )  Discharge Equipment Recommendations:  walker, rolling  Barriers to discharge:  None    History:     Past Medical History:   Diagnosis Date    Asthma     GERD (gastroesophageal reflux disease)     HLD (hyperlipidemia)     Hypothyroidism due to Hashimoto's thyroiditis     Migraines     MAURO (obstructive sleep apnea)     Prediabetes        Past Surgical History:   Procedure Laterality Date    SHOULDER SURGERY Left        Subjective     Chief Complaint: BLE weakness   Patient/Family stated goals: to get better   Communicated with: RN Rosario) prior to session. Session completed with PT.  present for session.   Pain/Comfort:  · Pain Rating 1: 0/10    Objective:     Patient found with: telemetry    General Precautions: Standard, fall, seizure   Orthopedic Precautions:N/A   Braces: N/A     Occupational Performance:    Bed Mobility:    · Patient completed Rolling/Turning to Right with supervision  · Patient completed Scooting/Bridging with supervision  · Patient completed Supine to Sit with supervision  · Patient completed Sit to Supine with supervision    Functional Mobility/Transfers:  · Patient completed Sit <> Stand Transfer with contact guard assistance  with  rolling walker x1 trial from EOB and x1 trial from chair   · Functional mobility: CGA with RW x2 trials for short household distance, seated rest break after first trial  · CGA for 6 stairs and B HHA     Activities of Daily Living:  · Upper Body Dressing: minimum assistance to don gown like jacket while seated  · Lower Body Dressing: moderate assistance to don B socks    · Reported use of bathroom with supervision from     Cognitive/Visual Perceptual:  Cognitive/Psychosocial Skills:     -       Oriented to: Person, Place,  Time and Situation   -       Follows Commands/attention:Follows multistep  commands  -       Communication: clear/fluent  -       Memory: No Deficits noted  -       Safety awareness/insight to disability: impaired   -       Mood/Affect/Coping skills/emotional control: Appropriate to situation  Visual/Perceptual:      -Intact      Physical Exam:  Postural examination/scapula alignment:    -       Rounded shoulders  -       Posterior pelvic tilt  Skin integrity: Visible skin intact  Edema:  None noted  Sensation:    -       Intact BUE  Motor Planning:    -       intact   Dominant hand:    -       R  Upper Extremity Range of Motion:     -       Right Upper Extremity: WFL   -       Left Upper Extremity: WFL    Upper Extremity Strength:   -       Right Upper Extremity: WFL   -       Left Upper Extremity: WFL     Strength:   -       Right Upper Extremity: WFL   -       Left Upper Extremity: WFL    Fine Motor Coordination:    -       Intact  Left hand thumb/finger opposition skills, Right hand thumb/finger opposition skills, Left hand, manipulation of objects and Right hand, manipulation of objects    Patient left with bed in chair position with all lines intact, call button in reach and   present    AMPA 6 Click:  AMPA Total Score: 21    Treatment & Education:  -Edu for OT role and POC  -Edu for importance of OOB activity and impact on healing  -Edu for role of OP therapy  -Edu for RW vs. rollator   -Edu for energy conservation and importance of planning during functional mobility for fall prevention  -Whiteboard updated   -Questions and concerns addressed within OT scope of practice   Education:    Assessment:     Vivian Peterson is a 48 y.o. female with a medical diagnosis of Altered mental status.  She presents with decreased functional independence in various areas of her life. She demo good motivation and willingness to participate. She demo decreased ADL and functional mobility skills. Pt demo decreased  endurance to functional task. Pt would continue to benefit from skilled OT services for maximum functional outcome and safety. Performance deficits affecting function are weakness, impaired endurance, impaired self care skills, impaired functional mobilty, decreased coordination, impaired balance, gait instability, decreased lower extremity function, impaired coordination, impaired cardiopulmonary response to activity.      Rehab Prognosis:  good; patient would benefit from acute skilled OT services to address these deficits and reach maximum level of function.       Plan:     Patient to be seen 3 x/week to address the above listed problems via self-care/home management, therapeutic activities, therapeutic exercises, neuromuscular re-education  · Plan of Care Expires: 12/01/18  · Plan of Care Reviewed with: patient, spouse    This Plan of care has been discussed with the patient who was involved in its development and understands and is in agreement with the identified goals and treatment plan    GOALS:   Multidisciplinary Problems     Occupational Therapy Goals        Problem: Occupational Therapy Goal    Goal Priority Disciplines Outcome Interventions   Occupational Therapy Goal     OT, PT/OT Ongoing (interventions implemented as appropriate)    Description:  Goals to be met by: 11/16  Patient will increase functional independence with ADLs by performing:    UE Dressing with [place] while seated.  LE Dressing with [place].  Grooming while standing at sink with [place] for oral hygiene.  Toileting from toilet with [place] for hygiene and clothing management.   Stand pivot transfers with Modified [place] and AD as needed to chair and to toilet.  Upper extremity exercise program x10 reps per handout, with independence for increased endurance to functional task.  Functional mobility with AD as needed for short household distance with no LOB with modified independence.                        Time Tracking:     OT Date of Treatment: 11/02/18  OT Start Time: 0849  OT Stop Time: 0918  OT Total Time (min): 29 min    Billable Minutes:Re-eval 10  Self Care/Home Management 8  Therapeutic Activity 11    ESTELA Miller  11/2/2018

## 2018-11-02 NOTE — PLAN OF CARE
Problem: Physical Therapy Goal  Goal: Physical Therapy Goal  Goals to be met by: 2018     Patient will increase functional independence with mobility by performin. Supine to sit with Modified [place]  2. Sit to supine with Modified [place]  3. Sit to stand transfer with Supervision  4. Gait  x 200 feet with Supervision with or without appropriate AD.   5. Ascend/descend 6 stair with bilateral Handrails Supervision.   6. Lower extremity exercise program x15 reps per handout, with supervision    Outcome: Ongoing (interventions implemented as appropriate)  Pt re-evaluation complete; pt goals set.    MARY RUSS, PT  2018

## 2018-11-02 NOTE — PLAN OF CARE
Problem: Occupational Therapy Goal  Goal: Occupational Therapy Goal  Goals to be met by: 11/16  Patient will increase functional independence with ADLs by performing:    UE Dressing with [place] while seated.  LE Dressing with [place].  Grooming while standing at sink with [place] for oral hygiene.  Toileting from toilet with [place] for hygiene and clothing management.   Stand pivot transfers with Modified [place] and AD as needed to chair and to toilet.  Upper extremity exercise program x10 reps per handout, with independence for increased endurance to functional task.  Functional mobility with AD as needed for short household distance with no LOB with modified independence.     Outcome: Ongoing (interventions implemented as appropriate)  OT re-eval this date. Recommend RW and OP therapy with family support. ESTELA Miller 11/2/2018

## 2018-11-03 LAB
ALBUMIN SERPL BCP-MCNC: 3.8 G/DL
ALP SERPL-CCNC: 127 U/L
ALT SERPL W/O P-5'-P-CCNC: 85 U/L
ANION GAP SERPL CALC-SCNC: 8 MMOL/L
AST SERPL-CCNC: 34 U/L
BASOPHILS # BLD AUTO: 0.04 K/UL
BASOPHILS NFR BLD: 0.7 %
BILIRUB SERPL-MCNC: 0.4 MG/DL
BUN SERPL-MCNC: 18 MG/DL
CALCIUM SERPL-MCNC: 9.5 MG/DL
CHLORIDE SERPL-SCNC: 106 MMOL/L
CO2 SERPL-SCNC: 23 MMOL/L
CREAT SERPL-MCNC: 0.7 MG/DL
DIFFERENTIAL METHOD: NORMAL
EOSINOPHIL # BLD AUTO: 0.1 K/UL
EOSINOPHIL NFR BLD: 0.9 %
ERYTHROCYTE [DISTWIDTH] IN BLOOD BY AUTOMATED COUNT: 13 %
EST. GFR  (AFRICAN AMERICAN): >60 ML/MIN/1.73 M^2
EST. GFR  (NON AFRICAN AMERICAN): >60 ML/MIN/1.73 M^2
GLUCOSE SERPL-MCNC: 92 MG/DL
HCT VFR BLD AUTO: 41.3 %
HGB BLD-MCNC: 13.2 G/DL
IMM GRANULOCYTES # BLD AUTO: 0.01 K/UL
IMM GRANULOCYTES NFR BLD AUTO: 0.2 %
LYMPHOCYTES # BLD AUTO: 1.9 K/UL
LYMPHOCYTES NFR BLD: 32.8 %
MAGNESIUM SERPL-MCNC: 2.1 MG/DL
MCH RBC QN AUTO: 29.7 PG
MCHC RBC AUTO-ENTMCNC: 32 G/DL
MCV RBC AUTO: 93 FL
MONOCYTES # BLD AUTO: 0.5 K/UL
MONOCYTES NFR BLD: 8.5 %
NEUTROPHILS # BLD AUTO: 3.3 K/UL
NEUTROPHILS NFR BLD: 56.9 %
NRBC BLD-RTO: 0 /100 WBC
PHOSPHATE SERPL-MCNC: 3.6 MG/DL
PLATELET # BLD AUTO: 191 K/UL
PMV BLD AUTO: 11.2 FL
POCT GLUCOSE: 107 MG/DL (ref 70–110)
POCT GLUCOSE: 80 MG/DL (ref 70–110)
POCT GLUCOSE: 89 MG/DL (ref 70–110)
POCT GLUCOSE: 93 MG/DL (ref 70–110)
POTASSIUM SERPL-SCNC: 3.7 MMOL/L
PROT SERPL-MCNC: 6.8 G/DL
RBC # BLD AUTO: 4.45 M/UL
SODIUM SERPL-SCNC: 137 MMOL/L
WBC # BLD AUTO: 5.85 K/UL

## 2018-11-03 PROCEDURE — 80053 COMPREHEN METABOLIC PANEL: CPT

## 2018-11-03 PROCEDURE — 99232 SBSQ HOSP IP/OBS MODERATE 35: CPT | Mod: ,,, | Performed by: INTERNAL MEDICINE

## 2018-11-03 PROCEDURE — 83735 ASSAY OF MAGNESIUM: CPT

## 2018-11-03 PROCEDURE — 11000001 HC ACUTE MED/SURG PRIVATE ROOM

## 2018-11-03 PROCEDURE — 36415 COLL VENOUS BLD VENIPUNCTURE: CPT

## 2018-11-03 PROCEDURE — 85025 COMPLETE CBC W/AUTO DIFF WBC: CPT

## 2018-11-03 PROCEDURE — 94660 CPAP INITIATION&MGMT: CPT

## 2018-11-03 PROCEDURE — 25000003 PHARM REV CODE 250: Performed by: PHYSICIAN ASSISTANT

## 2018-11-03 PROCEDURE — 84100 ASSAY OF PHOSPHORUS: CPT

## 2018-11-03 PROCEDURE — A4216 STERILE WATER/SALINE, 10 ML: HCPCS | Performed by: PHYSICIAN ASSISTANT

## 2018-11-03 PROCEDURE — 83921 ORGANIC ACID SINGLE QUANT: CPT

## 2018-11-03 PROCEDURE — 63600175 PHARM REV CODE 636 W HCPCS: Performed by: INTERNAL MEDICINE

## 2018-11-03 RX ADMIN — CETIRIZINE HYDROCHLORIDE 10 MG: 5 TABLET ORAL at 09:11

## 2018-11-03 RX ADMIN — MONTELUKAST SODIUM 10 MG: 10 TABLET, FILM COATED ORAL at 08:11

## 2018-11-03 RX ADMIN — ASPIRIN 81 MG: 81 TABLET, COATED ORAL at 09:11

## 2018-11-03 RX ADMIN — Medication 3 ML: at 03:11

## 2018-11-03 RX ADMIN — Medication 3 ML: at 05:11

## 2018-11-03 RX ADMIN — LEVOTHYROXINE SODIUM 50 MCG: 50 TABLET ORAL at 05:11

## 2018-11-03 RX ADMIN — VITAMIN D, TAB 1000IU (100/BT) 1000 UNITS: 25 TAB at 09:11

## 2018-11-03 RX ADMIN — ENOXAPARIN SODIUM 40 MG: 100 INJECTION SUBCUTANEOUS at 05:11

## 2018-11-03 RX ADMIN — THERA TABS 1 TABLET: TAB at 09:11

## 2018-11-03 RX ADMIN — Medication 3 ML: at 10:11

## 2018-11-03 RX ADMIN — DOCUSATE SODIUM 100 MG: 100 CAPSULE, LIQUID FILLED ORAL at 08:11

## 2018-11-03 RX ADMIN — PANTOPRAZOLE SODIUM 40 MG: 40 TABLET, DELAYED RELEASE ORAL at 09:11

## 2018-11-03 RX ADMIN — ATORVASTATIN CALCIUM 20 MG: 20 TABLET, FILM COATED ORAL at 08:11

## 2018-11-03 NOTE — PROGRESS NOTES
"[facility] Hospital Medicine  Progress Note    Patient Name: Vivian Peterson  MRN: [id number]  Patient Class: IP- Inpatient   Admission Date: 10/27/2018  Length of Stay: 7 days  Attending Physician: Rubi Aguila MD  Primary Care Provider: Provider [facility] Medicine Team: [facility] HOSP MED G Rubi Aguila MD    Subjective:     Principal Problem:Altered mental status    HPI:  Patient is a 48 year old lady with a h/o migraines, prediabetes on Metformin, GERD, asthma, HLD, hypothyroidism secondary to Hashimoto's, and MAURO.  She was transferred from [facility] where she presented with L sided weakness, numbness, and "staring spells."   at bedside assists with HPI.  Patient began having episodes of L sided weakness, gait disturbance (L>R), tremors, "catatonic episodes," and syncope in February.  Patient was living in [place] at that time and had been taken off her thyroid medications by her PCP.  She was admitted to the hospital and was told she had thyroid storm.  On discharge she was restarted on her medication.  However, patient continued to have episodes.  She was seen by neurology, but her neurologist moved and she was lost to follow-up.  She has had four episodes since that time.  Her most recent episode was on September 22, when she presented to [facility] with syncope and gait disturbance.  Her work-up, including MRI brain, CT head, and LP, was unremarkable.  She was discharged on a higher dose of Topamax (100mg qAM and 150mg qHS).  This evening she presented to OSH with abnormal gait and episodes of "staring off" when she is not able to communicate.  Episodes last about ten minutes.  She had one event while in the ER prior to transfer.  On exam, patient is oriented to situation, place, and time.  When asked her full name, she is unable to answer; however she was able to answer when asked what her first name was and what her last " "name was separately.  She denies chest pain, complains of chronic SOB secondary to asthma.  Denies dizziness, palpitations, fever/chills, N/V/D, abdominal pain.          Hospital Course:  10/27 Consulted neurology, ordered EEG. Requesting MRI of head and Cand T spine - will need to clarify with or without contrast  10/27 Brain/Cervical/Thoracic spine MRI (10/27): "Right frontal lobe developmental venous anomaly." Anomaly does not explain symptoms. Cervical/thoracic spine unremarkable  10/29 rapid response called for seizures / reported as starring spells with jerking movements of head and neck lasting > 30 mins and hence admitted to [facility]. EEG monitoring in place   10/30 No events overnight. Stable for transfer to Hospital Medicine         Interval History: No recurrence of spells. Trying to increase PO fluid and sodium. No acute complaints today.     Review of Systems   Constitutional: Negative for chills.   HENT: Negative for congestion and nosebleeds.    Eyes: Negative for discharge and itching.   Respiratory: Negative for cough, choking and shortness of breath.    Cardiovascular: Negative for chest pain, palpitations and leg swelling.   Gastrointestinal: Negative for constipation, diarrhea and nausea.   Neurological: Negative for syncope, facial asymmetry, speech difficulty, light-headedness and headaches.     Objective:     Vital Signs (Most Recent):  Temp: 98.2 °F (36.8 °C) (11/03/18 0500)  Pulse: 83 (11/03/18 0815)  Resp: 18 (11/03/18 0815)  BP: 103/74 (11/03/18 0815)  SpO2: (!) 94 % (11/03/18 0815) Vital Signs (24h Range):  Temp:  [98.1 °F (36.7 °C)-98.6 °F (37 °C)] 98.2 °F (36.8 °C)  Pulse:  [79-95] 83  Resp:  [16-20] 18  SpO2:  [92 %-96 %] 94 %  BP: ()/(74-84) 103/74     Weight: 67 kg (147 lb 12.8 oz)  Body mass index is 28.87 kg/m².    Intake/Output Summary (Last 24 hours) at 11/3/2018 1234  Last data filed at 11/2/2018 1300  Gross per 24 hour   Intake 200 ml   Output --   Net 200 ml      Physical " Exam   Constitutional: She is oriented to person, place, and time. She appears well-developed and well-nourished. No distress.   Cardiovascular: Normal rate, regular rhythm and intact distal pulses.   Pulmonary/Chest: Effort normal.   Abdominal: Soft.   Musculoskeletal: She exhibits no edema.   Neurological: She is alert and oriented to person, place, and time.   Skin: Skin is warm and dry. No rash noted.       Significant Labs:   CBC:   Recent Labs   Lab 11/02/18  1200 11/03/18  0522   WBC 5.50 5.85   HGB 13.6 13.2   HCT 39.3 41.3    191     CMP:   Recent Labs   Lab 11/02/18  1200 11/03/18  0522    137   K 4.1 3.7    106   CO2 21* 23   * 92   BUN 18 18   CREATININE 0.8 0.7   CALCIUM 9.3 9.5   PROT 7.0 6.8   ALBUMIN 3.9 3.8   BILITOT 0.3 0.4   ALKPHOS 129 127   AST 53* 34   * 85*   ANIONGAP 8 8   EGFRNONAA >60.0 >60.0     All pertinent labs within the past 24 hours have been reviewed.    Significant Imaging: I have reviewed all pertinent imaging results/findings within the past 24 hours.    Assessment/Plan:      * Altered mental status    Self-resolving, 10-20 minute episodes of transient loss of consciousness. Suspect orthostatic syncopal episodes. DDx neurally mediated syncope, additionally considering cardiac syncope versus non-epileptiform spells. Personal history of palpitations and arrhythmia necessitating beta blocker use, no longer taking beta blocker after running out of Rx. Personal history of orthostatic dizziness and syncope.  - Orthostatic vitals 11/1 positive  - encouraged patient to increase fluid intake, sodium  - EEG negative during episode on 11/1/18, low concern for seizures  - Imaging thus far: CT head, MRI brain, MRI C-spine, MRI L-spine shows small venous malformation that is reportedly unlikely to be related to her symptoms, otherwise normal  - UA, Utox, LP negative  - TSH and FT4 normal  - B1, B3, B6, B12, folate normal, added MMA  - RPR negative  -  Echocardiogram normal  - ECG with nonspecific t wave changes  - Telemetry  - Carotid US with 1-39% narrowing, on ASA  - will need outpatient tilt table testing  - HOLD amitriptyline     Migraines    Chronic, stable  - holding topiramate, patient would like to defer restart to outpatient  - hold amitriptyline     Asthma    Chronic  - Continue montelukast     Prediabetes    Chronic, HbA1c 5.5%  - accuchecks and low dose aspart correction QACHS     MAURO (obstructive sleep apnea)    - CPAP qHS.       GERD (gastroesophageal reflux disease)    - continue pantoprazole       HLD (hyperlipidemia)    Chronic  - continue home statin       Hypothyroidism due to Hashimoto's thyroiditis    TSH and FT4 normal. Has history of thyroid nodule  - Continue levothyroxine 50mcg  - US thyroid normal, no lesions or nodules       VTE Risk Mitigation (From admission, onward)        Ordered     enoxaparin injection 40 mg  Daily      10/28/18 1512     Place sequential compression device  Until discontinued      10/27/18 0515     Place HERBIE hose  Until discontinued      10/27/18 0515     IP VTE HIGH RISK PATIENT  Once      10/27/18 0515          Rubi Aguila M.D., M.P.H.  Department of Hospital Medicine  [facility]  (pager) [phone]  (spect) [phone]

## 2018-11-03 NOTE — ASSESSMENT & PLAN NOTE
TSH and FT4 normal. Has history of thyroid nodule  - Continue levothyroxine 50mcg  - US thyroid normal, no lesions or nodules

## 2018-11-03 NOTE — ASSESSMENT & PLAN NOTE
Self-resolving, 10-20 minute episodes of transient loss of consciousness. Suspect orthostatic syncopal episodes. DDx neurally mediated syncope, additionally considering cardiac syncope versus non-epileptiform spells. Personal history of palpitations and arrhythmia necessitating beta blocker use, no longer taking beta blocker after running out of Rx. Personal history of orthostatic dizziness and syncope.  - Orthostatic vitals 11/1 positive  - encouraged patient to increase fluid intake, sodium  - EEG negative during episode on 11/1/18, low concern for seizures  - Imaging thus far: CT head, MRI brain, MRI C-spine, MRI L-spine shows small venous malformation that is reportedly unlikely to be related to her symptoms, otherwise normal  - UA, Utox, LP negative  - TSH and FT4 normal  - B1, B3, B6, B12, folate normal, added MMA  - RPR negative  - Echocardiogram normal  - ECG with nonspecific t wave changes  - Telemetry  - Carotid US with 1-39% narrowing, on ASA  - will need outpatient tilt table testing  - HOLD amitriptyline

## 2018-11-03 NOTE — SUBJECTIVE & OBJECTIVE
Interval History: No recurrence of spells. Trying to increase PO fluid and sodium. No acute complaints today.     Review of Systems   Constitutional: Negative for chills.   HENT: Negative for congestion and nosebleeds.    Eyes: Negative for discharge and itching.   Respiratory: Negative for cough, choking and shortness of breath.    Cardiovascular: Negative for chest pain, palpitations and leg swelling.   Gastrointestinal: Negative for constipation, diarrhea and nausea.   Neurological: Negative for syncope, facial asymmetry, speech difficulty, light-headedness and headaches.     Objective:     Vital Signs (Most Recent):  Temp: 98.2 °F (36.8 °C) (11/03/18 0500)  Pulse: 83 (11/03/18 0815)  Resp: 18 (11/03/18 0815)  BP: 103/74 (11/03/18 0815)  SpO2: (!) 94 % (11/03/18 0815) Vital Signs (24h Range):  Temp:  [98.1 °F (36.7 °C)-98.6 °F (37 °C)] 98.2 °F (36.8 °C)  Pulse:  [79-95] 83  Resp:  [16-20] 18  SpO2:  [92 %-96 %] 94 %  BP: ()/(74-84) 103/74     Weight: 67 kg (147 lb 12.8 oz)  Body mass index is 28.87 kg/m².    Intake/Output Summary (Last 24 hours) at 11/3/2018 1234  Last data filed at 11/2/2018 1300  Gross per 24 hour   Intake 200 ml   Output --   Net 200 ml      Physical Exam   Constitutional: She is oriented to person, place, and time. She appears well-developed and well-nourished. No distress.   Cardiovascular: Normal rate, regular rhythm and intact distal pulses.   Pulmonary/Chest: Effort normal.   Abdominal: Soft.   Musculoskeletal: She exhibits no edema.   Neurological: She is alert and oriented to person, place, and time.   Skin: Skin is warm and dry. No rash noted.       Significant Labs:   CBC:   Recent Labs   Lab 11/02/18  1200 11/03/18 0522   WBC 5.50 5.85   HGB 13.6 13.2   HCT 39.3 41.3    191     CMP:   Recent Labs   Lab 11/02/18  1200 11/03/18  0522    137   K 4.1 3.7    106   CO2 21* 23   * 92   BUN 18 18   CREATININE 0.8 0.7   CALCIUM 9.3 9.5   PROT 7.0 6.8   ALBUMIN  3.9 3.8   BILITOT 0.3 0.4   ALKPHOS 129 127   AST 53* 34   * 85*   ANIONGAP 8 8   EGFRNONAA >60.0 >60.0     All pertinent labs within the past 24 hours have been reviewed.    Significant Imaging: I have reviewed all pertinent imaging results/findings within the past 24 hours.

## 2018-11-04 PROBLEM — I95.1 ORTHOSTATIC SYNCOPE: Status: ACTIVE | Noted: 2018-10-26

## 2018-11-04 LAB
ALBUMIN SERPL BCP-MCNC: 4 G/DL
ALP SERPL-CCNC: 129 U/L
ALT SERPL W/O P-5'-P-CCNC: 68 U/L
ANION GAP SERPL CALC-SCNC: 8 MMOL/L
AST SERPL-CCNC: 26 U/L
BASOPHILS # BLD AUTO: 0.03 K/UL
BASOPHILS NFR BLD: 0.6 %
BILIRUB SERPL-MCNC: 0.4 MG/DL
BUN SERPL-MCNC: 16 MG/DL
CALCIUM SERPL-MCNC: 9.2 MG/DL
CHLORIDE SERPL-SCNC: 105 MMOL/L
CO2 SERPL-SCNC: 26 MMOL/L
CREAT SERPL-MCNC: 0.8 MG/DL
DIFFERENTIAL METHOD: NORMAL
EOSINOPHIL # BLD AUTO: 0.1 K/UL
EOSINOPHIL NFR BLD: 0.9 %
ERYTHROCYTE [DISTWIDTH] IN BLOOD BY AUTOMATED COUNT: 12.8 %
EST. GFR  (AFRICAN AMERICAN): >60 ML/MIN/1.73 M^2
EST. GFR  (NON AFRICAN AMERICAN): >60 ML/MIN/1.73 M^2
GLUCOSE SERPL-MCNC: 86 MG/DL
HCT VFR BLD AUTO: 39.7 %
HGB BLD-MCNC: 13 G/DL
IMM GRANULOCYTES # BLD AUTO: 0.01 K/UL
IMM GRANULOCYTES NFR BLD AUTO: 0.2 %
LYMPHOCYTES # BLD AUTO: 1.6 K/UL
LYMPHOCYTES NFR BLD: 29.6 %
MAGNESIUM SERPL-MCNC: 2.2 MG/DL
MCH RBC QN AUTO: 29.4 PG
MCHC RBC AUTO-ENTMCNC: 32.7 G/DL
MCV RBC AUTO: 90 FL
MONOCYTES # BLD AUTO: 0.5 K/UL
MONOCYTES NFR BLD: 9 %
NEUTROPHILS # BLD AUTO: 3.2 K/UL
NEUTROPHILS NFR BLD: 59.7 %
NRBC BLD-RTO: 0 /100 WBC
PHOSPHATE SERPL-MCNC: 3.7 MG/DL
PLATELET # BLD AUTO: 174 K/UL
PMV BLD AUTO: 11 FL
POCT GLUCOSE: 121 MG/DL (ref 70–110)
POCT GLUCOSE: 130 MG/DL (ref 70–110)
POTASSIUM SERPL-SCNC: 3.9 MMOL/L
PROT SERPL-MCNC: 7.1 G/DL
RBC # BLD AUTO: 4.42 M/UL
SODIUM SERPL-SCNC: 139 MMOL/L
WBC # BLD AUTO: 5.34 K/UL

## 2018-11-04 PROCEDURE — 25000003 PHARM REV CODE 250: Performed by: INTERNAL MEDICINE

## 2018-11-04 PROCEDURE — A4216 STERILE WATER/SALINE, 10 ML: HCPCS | Performed by: PHYSICIAN ASSISTANT

## 2018-11-04 PROCEDURE — 63600175 PHARM REV CODE 636 W HCPCS: Performed by: INTERNAL MEDICINE

## 2018-11-04 PROCEDURE — 25000003 PHARM REV CODE 250: Performed by: PHYSICIAN ASSISTANT

## 2018-11-04 PROCEDURE — 11000001 HC ACUTE MED/SURG PRIVATE ROOM

## 2018-11-04 PROCEDURE — 80053 COMPREHEN METABOLIC PANEL: CPT

## 2018-11-04 PROCEDURE — 84100 ASSAY OF PHOSPHORUS: CPT

## 2018-11-04 PROCEDURE — 94761 N-INVAS EAR/PLS OXIMETRY MLT: CPT

## 2018-11-04 PROCEDURE — 99232 SBSQ HOSP IP/OBS MODERATE 35: CPT | Mod: ,,, | Performed by: INTERNAL MEDICINE

## 2018-11-04 PROCEDURE — 36415 COLL VENOUS BLD VENIPUNCTURE: CPT

## 2018-11-04 PROCEDURE — 83735 ASSAY OF MAGNESIUM: CPT

## 2018-11-04 PROCEDURE — 85025 COMPLETE CBC W/AUTO DIFF WBC: CPT

## 2018-11-04 RX ORDER — MIDODRINE HYDROCHLORIDE 2.5 MG/1
2.5 TABLET ORAL 3 TIMES DAILY
Status: DISCONTINUED | OUTPATIENT
Start: 2018-11-04 | End: 2018-11-05 | Stop reason: HOSPADM

## 2018-11-04 RX ADMIN — THERA TABS 1 TABLET: TAB at 09:11

## 2018-11-04 RX ADMIN — MIDODRINE HYDROCHLORIDE 2.5 MG: 2.5 TABLET ORAL at 09:11

## 2018-11-04 RX ADMIN — MONTELUKAST SODIUM 10 MG: 10 TABLET, FILM COATED ORAL at 08:11

## 2018-11-04 RX ADMIN — PANTOPRAZOLE SODIUM 40 MG: 40 TABLET, DELAYED RELEASE ORAL at 09:11

## 2018-11-04 RX ADMIN — CETIRIZINE HYDROCHLORIDE 10 MG: 5 TABLET ORAL at 09:11

## 2018-11-04 RX ADMIN — LEVOTHYROXINE SODIUM 50 MCG: 50 TABLET ORAL at 05:11

## 2018-11-04 RX ADMIN — Medication 3 ML: at 10:11

## 2018-11-04 RX ADMIN — VITAMIN D, TAB 1000IU (100/BT) 1000 UNITS: 25 TAB at 12:11

## 2018-11-04 RX ADMIN — DOCUSATE SODIUM 100 MG: 100 CAPSULE, LIQUID FILLED ORAL at 08:11

## 2018-11-04 RX ADMIN — DOCUSATE SODIUM 100 MG: 100 CAPSULE, LIQUID FILLED ORAL at 09:11

## 2018-11-04 RX ADMIN — ATORVASTATIN CALCIUM 20 MG: 20 TABLET, FILM COATED ORAL at 08:11

## 2018-11-04 RX ADMIN — MIDODRINE HYDROCHLORIDE 2.5 MG: 2.5 TABLET ORAL at 05:11

## 2018-11-04 RX ADMIN — ENOXAPARIN SODIUM 40 MG: 100 INJECTION SUBCUTANEOUS at 05:11

## 2018-11-04 RX ADMIN — ASPIRIN 81 MG: 81 TABLET, COATED ORAL at 09:11

## 2018-11-04 RX ADMIN — Medication 3 ML: at 05:11

## 2018-11-04 NOTE — PROGRESS NOTES
"[facility] Hospital Medicine  Progress Note    Patient Name: Vivian Peterson  MRN: [id number]  Patient Class: IP- Inpatient   Admission Date: 10/27/2018  Length of Stay: 8 days  Attending Physician: Rubi Aguila MD  Primary Care Provider: Provider [facility] Medicine Team: [facility] HOSP MED G Rubi Aguila MD    Subjective:     Principal Problem:Orthostatic syncope    HPI:  Patient is a 48 year old lady with a h/o migraines, prediabetes on Metformin, GERD, asthma, HLD, hypothyroidism secondary to Hashimoto's, and MAURO.  She was transferred from [facility] where she presented with L sided weakness, numbness, and "staring spells."   at bedside assists with HPI.  Patient began having episodes of L sided weakness, gait disturbance (L>R), tremors, "catatonic episodes," and syncope in February.  Patient was living in [place] at that time and had been taken off her thyroid medications by her PCP.  She was admitted to the hospital and was told she had thyroid storm.  On discharge she was restarted on her medication.  However, patient continued to have episodes.  She was seen by neurology, but her neurologist moved and she was lost to follow-up.  She has had four episodes since that time.  Her most recent episode was on September 22, when she presented to [facility] with syncope and gait disturbance.  Her work-up, including MRI brain, CT head, and LP, was unremarkable.  She was discharged on a higher dose of Topamax (100mg qAM and 150mg qHS).  This evening she presented to OSH with abnormal gait and episodes of "staring off" when she is not able to communicate.  Episodes last about ten minutes.  She had one event while in the ER prior to transfer.  On exam, patient is oriented to situation, place, and time.  When asked her full name, she is unable to answer; however she was able to answer when asked what her first name was and what her last name " "was separately.  She denies chest pain, complains of chronic SOB secondary to asthma.  Denies dizziness, palpitations, fever/chills, N/V/D, abdominal pain.        Hospital Course:  Ms. Peterson was admitted to Hospital Medicine after transfer from [facility] for evaluation of seizure-like activity. Neurology was consulted. She underwent MRI and CT which were negative for stroke, showed only "right frontal lobe developmental venous anomaly" on MRI which Neurology did not feel explained her symptoms. Endocrine and nutritional workup have been normal. Her TSH/FT4 are stable on her current dose of levothyroxine. US thyroid was normal. On 10/29, she experienced a seizure-like episode described as "staring spell with jerking movements of the head and neck lasting >30 minutes", rapid response was called, and she was transferred to the Neuro ICU. EEG was not active during the episode. EEG was started and she experienced no further episodes in the ICU.    She was transferred back to Hospital Medicine on 10/30. On the floor, she suffered a recurrent episode. EEG showed no evidence of epileptiform activity. Neurology reassured the patient that her symptoms were not seizures and have now signed off. Additional review of her history revealed that her symptoms have often been precipitated by palpitations with rapid heart rate, and that she had a long history of orthostatic dizziness and syncope, as well as a history of tachyarrhythmia previously treated with metoprolol. Her orthostatic vitals were positive. Cardiac workup including ECG, echocardiogram, and telemetry were normal. She showed no evidence of dysrhythmia. US carotids showed mild stenosis bilaterally <40%, but no abnormality at the carotid bifurcation. She is already on a daily aspirin.     It is highly likely that her episodes are related to orthostasis and cerebral hypoxia in the setting of chronically low blood pressure and frequent orthostatic syncope. She was instructed to " increase her oral fluid and salt intake in order to compensate for her low blood pressure, with modest improvement in her orthostatic symptoms. She should have outpatient follow up with Cardiology for further clarification of her history of dysrhythmia and likely Holter or 30 day monitor to capture episodes. She will additionally need outpatient tilt table testing. She may be a good candidate for midodrine therapy if her episodes continue.     Interval History: Continues to have orthostatic dizziness, two episodes yesterday associated with nausea. No syncope. Poor po intake yesterday.     Review of Systems   Constitutional: Negative for chills.   HENT: Negative for congestion and nosebleeds.    Eyes: Negative for discharge and itching.   Respiratory: Negative for cough, choking and shortness of breath.    Cardiovascular: Negative for chest pain, palpitations and leg swelling.   Gastrointestinal: Negative for constipation, diarrhea and nausea.   Neurological: Positive for dizziness and light-headedness. Negative for syncope, facial asymmetry, speech difficulty and headaches.     Objective:     Vital Signs (Most Recent):  Temp: 98 °F (36.7 °C) (11/04/18 1027)  Pulse: 84 (11/04/18 0730)  Resp: 10 (11/04/18 0730)  BP: 101/67 (11/04/18 0730)  SpO2: 95 % (11/04/18 0730) Vital Signs (24h Range):  Temp:  [98 °F (36.7 °C)-98.5 °F (36.9 °C)] 98 °F (36.7 °C)  Pulse:  [] 84  Resp:  [10-20] 10  SpO2:  [5 %-98 %] 95 %  BP: ()/(62-74) 101/67     Weight: 67 kg (147 lb 12.8 oz)  Body mass index is 28.87 kg/m².    Intake/Output Summary (Last 24 hours) at 11/4/2018 1418  Last data filed at 11/4/2018 0556  Gross per 24 hour   Intake 720 ml   Output --   Net 720 ml      Physical Exam   Constitutional: She is oriented to person, place, and time. She appears well-developed and well-nourished. No distress.   Cardiovascular: Normal rate, regular rhythm and intact distal pulses.   Pulmonary/Chest: Effort normal.   Abdominal:  Soft.   Musculoskeletal: She exhibits no edema.   Neurological: She is alert and oriented to person, place, and time.   Skin: Skin is warm and dry. No rash noted.       Significant Labs:   CBC:   Recent Labs   Lab 11/03/18 0522 11/04/18  0557   WBC 5.85 5.34   HGB 13.2 13.0   HCT 41.3 39.7    174     CMP:   Recent Labs   Lab 11/03/18 0522 11/04/18  0557    139   K 3.7 3.9    105   CO2 23 26   GLU 92 86   BUN 18 16   CREATININE 0.7 0.8   CALCIUM 9.5 9.2   PROT 6.8 7.1   ALBUMIN 3.8 4.0   BILITOT 0.4 0.4   ALKPHOS 127 129   AST 34 26   ALT 85* 68*   ANIONGAP 8 8   EGFRNONAA >60.0 >60.0     All pertinent labs within the past 24 hours have been reviewed.    Significant Imaging: I have reviewed all pertinent imaging results/findings within the past 24 hours.    Assessment/Plan:      * Orthostatic syncope    Self-resolving, 10-20 minute episodes of transient loss of consciousness. Suspect orthostatic syncopal episodes. DDx neurally mediated syncope, additionally considering cardiac syncope versus non-epileptiform spells. Personal history of palpitations and arrhythmia necessitating beta blocker use, no longer taking beta blocker after running out of Rx. Personal history of orthostatic dizziness and syncope.  - Orthostatic vitals 11/1 positive  - encouraged patient to increase fluid intake, sodium  - some improvement with conservative measures, but orthostatic dizziness continues  - EEG negative during episode on 11/1/18, low concern for seizures  - Imaging thus far: CT head, MRI brain, MRI C-spine, MRI L-spine shows small venous malformation that is reportedly unlikely to be related to her symptoms, otherwise normal  - UA, Utox, LP negative  - TSH and FT4 normal  - B1, B3, B6, B12, folate normal, added MMA  - RPR negative  - Echocardiogram normal  - ECG with nonspecific t wave changes  - Telemetry  - Carotid US with 1-39% narrowing, on ASA  - will need outpatient tilt table testing  - HOLD  amitriptyline  - start low dose midodrine     Migraines    Chronic, stable  - holding topiramate, patient would like to defer restart to outpatient  - hold amitriptyline     Asthma    Chronic  - Continue montelukast     Prediabetes    Chronic, HbA1c 5.5%  - accuchecks and low dose aspart correction QACHS     MAURO (obstructive sleep apnea)    - CPAP qHS.       GERD (gastroesophageal reflux disease)    - continue pantoprazole       HLD (hyperlipidemia)    Chronic  - continue home statin       Hypothyroidism due to Hashimoto's thyroiditis    TSH and FT4 normal. Has history of thyroid nodule  - Continue levothyroxine 50mcg  - US thyroid normal, no lesions or nodules       VTE Risk Mitigation (From admission, onward)        Ordered     enoxaparin injection 40 mg  Daily      10/28/18 1512     Place sequential compression device  Until discontinued      10/27/18 0515     Place HERBIE hose  Until discontinued      10/27/18 0515     IP VTE HIGH RISK PATIENT  Once      10/27/18 0515          Rubi Aguila M.D., M.P.H.  Department of Hospital Medicine  [facility]  (pager) [phone]  (spect) [phone]

## 2018-11-04 NOTE — SUBJECTIVE & OBJECTIVE
Interval History: Continues to have orthostatic dizziness, two episodes yesterday associated with nausea. No syncope. Poor po intake yesterday.     Review of Systems   Constitutional: Negative for chills.   HENT: Negative for congestion and nosebleeds.    Eyes: Negative for discharge and itching.   Respiratory: Negative for cough, choking and shortness of breath.    Cardiovascular: Negative for chest pain, palpitations and leg swelling.   Gastrointestinal: Negative for constipation, diarrhea and nausea.   Neurological: Positive for dizziness and light-headedness. Negative for syncope, facial asymmetry, speech difficulty and headaches.     Objective:     Vital Signs (Most Recent):  Temp: 98 °F (36.7 °C) (11/04/18 1027)  Pulse: 84 (11/04/18 0730)  Resp: 10 (11/04/18 0730)  BP: 101/67 (11/04/18 0730)  SpO2: 95 % (11/04/18 0730) Vital Signs (24h Range):  Temp:  [98 °F (36.7 °C)-98.5 °F (36.9 °C)] 98 °F (36.7 °C)  Pulse:  [] 84  Resp:  [10-20] 10  SpO2:  [5 %-98 %] 95 %  BP: ()/(62-74) 101/67     Weight: 67 kg (147 lb 12.8 oz)  Body mass index is 28.87 kg/m².    Intake/Output Summary (Last 24 hours) at 11/4/2018 1418  Last data filed at 11/4/2018 0556  Gross per 24 hour   Intake 720 ml   Output --   Net 720 ml      Physical Exam   Constitutional: She is oriented to person, place, and time. She appears well-developed and well-nourished. No distress.   Cardiovascular: Normal rate, regular rhythm and intact distal pulses.   Pulmonary/Chest: Effort normal.   Abdominal: Soft.   Musculoskeletal: She exhibits no edema.   Neurological: She is alert and oriented to person, place, and time.   Skin: Skin is warm and dry. No rash noted.       Significant Labs:   CBC:   Recent Labs   Lab 11/03/18 0522 11/04/18 0557   WBC 5.85 5.34   HGB 13.2 13.0   HCT 41.3 39.7    174     CMP:   Recent Labs   Lab 11/03/18 0522 11/04/18 0557    139   K 3.7 3.9    105   CO2 23 26   GLU 92 86   BUN 18 16   CREATININE  0.7 0.8   CALCIUM 9.5 9.2   PROT 6.8 7.1   ALBUMIN 3.8 4.0   BILITOT 0.4 0.4   ALKPHOS 127 129   AST 34 26   ALT 85* 68*   ANIONGAP 8 8   EGFRNONAA >60.0 >60.0     All pertinent labs within the past 24 hours have been reviewed.    Significant Imaging: I have reviewed all pertinent imaging results/findings within the past 24 hours.

## 2018-11-04 NOTE — PLAN OF CARE
"Problem: Patient Care Overview  Goal: Plan of Care Review  PMH: DM, GERD,asthma, HLD, MAURO, hypothyroidism, migraines, thyroid storm    10/27: ED with AMS, abnormal gait, "seziure like" episodes   10/29: Core call to SICU for seizure like episode, continuous EEG  10/30: CTA cancelled per Neuro. D/C NS @100ml/hr.      Outcome: Ongoing (interventions implemented as appropriate)  No acute events throughout shift.ambulated in hallway  With spouse.  Vitals and assessment completed as ordered. Pt calm and cooperative. No complains of pain.  Bed in low position and lowed.Pt free from injury or falls      "

## 2018-11-04 NOTE — ASSESSMENT & PLAN NOTE
Self-resolving, 10-20 minute episodes of transient loss of consciousness. Suspect orthostatic syncopal episodes. DDx neurally mediated syncope, additionally considering cardiac syncope versus non-epileptiform spells. Personal history of palpitations and arrhythmia necessitating beta blocker use, no longer taking beta blocker after running out of Rx. Personal history of orthostatic dizziness and syncope.  - Orthostatic vitals 11/1 positive  - encouraged patient to increase fluid intake, sodium  - some improvement with conservative measures, but orthostatic dizziness continues  - EEG negative during episode on 11/1/18, low concern for seizures  - Imaging thus far: CT head, MRI brain, MRI C-spine, MRI L-spine shows small venous malformation that is reportedly unlikely to be related to her symptoms, otherwise normal  - UA, Utox, LP negative  - TSH and FT4 normal  - B1, B3, B6, B12, folate normal, added MMA  - RPR negative  - Echocardiogram normal  - ECG with nonspecific t wave changes  - Telemetry  - Carotid US with 1-39% narrowing, on ASA  - will need outpatient tilt table testing  - HOLD amitriptyline  - start low dose midodrine

## 2018-11-05 VITALS
DIASTOLIC BLOOD PRESSURE: 69 MMHG | RESPIRATION RATE: 15 BRPM | HEART RATE: 93 BPM | TEMPERATURE: 98 F | BODY MASS INDEX: 29.02 KG/M2 | HEIGHT: 60 IN | WEIGHT: 147.81 LBS | SYSTOLIC BLOOD PRESSURE: 114 MMHG | OXYGEN SATURATION: 97 %

## 2018-11-05 LAB
ALBUMIN SERPL BCP-MCNC: 4.1 G/DL
ALP SERPL-CCNC: 131 U/L
ALT SERPL W/O P-5'-P-CCNC: 67 U/L
ANION GAP SERPL CALC-SCNC: 7 MMOL/L
AST SERPL-CCNC: 29 U/L
BASOPHILS # BLD AUTO: 0.02 K/UL
BASOPHILS NFR BLD: 0.3 %
BILIRUB SERPL-MCNC: 0.5 MG/DL
BUN SERPL-MCNC: 13 MG/DL
CALCIUM SERPL-MCNC: 9.3 MG/DL
CHLORIDE SERPL-SCNC: 105 MMOL/L
CO2 SERPL-SCNC: 26 MMOL/L
CREAT SERPL-MCNC: 0.8 MG/DL
DIFFERENTIAL METHOD: NORMAL
EOSINOPHIL # BLD AUTO: 0.1 K/UL
EOSINOPHIL NFR BLD: 1 %
ERYTHROCYTE [DISTWIDTH] IN BLOOD BY AUTOMATED COUNT: 12.8 %
EST. GFR  (AFRICAN AMERICAN): >60 ML/MIN/1.73 M^2
EST. GFR  (NON AFRICAN AMERICAN): >60 ML/MIN/1.73 M^2
GLUCOSE SERPL-MCNC: 96 MG/DL
HCT VFR BLD AUTO: 41.6 %
HGB BLD-MCNC: 13.9 G/DL
IMM GRANULOCYTES # BLD AUTO: 0.01 K/UL
IMM GRANULOCYTES NFR BLD AUTO: 0.2 %
LYMPHOCYTES # BLD AUTO: 1.7 K/UL
LYMPHOCYTES NFR BLD: 28.9 %
MAGNESIUM SERPL-MCNC: 2.3 MG/DL
MCH RBC QN AUTO: 30.2 PG
MCHC RBC AUTO-ENTMCNC: 33.4 G/DL
MCV RBC AUTO: 90 FL
MONOCYTES # BLD AUTO: 0.5 K/UL
MONOCYTES NFR BLD: 8.3 %
NEUTROPHILS # BLD AUTO: 3.5 K/UL
NEUTROPHILS NFR BLD: 61.3 %
NRBC BLD-RTO: 0 /100 WBC
PHOSPHATE SERPL-MCNC: 3.4 MG/DL
PLATELET # BLD AUTO: 193 K/UL
PMV BLD AUTO: 11.3 FL
POCT GLUCOSE: 86 MG/DL (ref 70–110)
POCT GLUCOSE: 87 MG/DL (ref 70–110)
POTASSIUM SERPL-SCNC: 4.1 MMOL/L
PROT SERPL-MCNC: 7.1 G/DL
RBC # BLD AUTO: 4.6 M/UL
SODIUM SERPL-SCNC: 138 MMOL/L
WBC # BLD AUTO: 5.75 K/UL

## 2018-11-05 PROCEDURE — 25000003 PHARM REV CODE 250: Performed by: INTERNAL MEDICINE

## 2018-11-05 PROCEDURE — 80053 COMPREHEN METABOLIC PANEL: CPT

## 2018-11-05 PROCEDURE — 99900035 HC TECH TIME PER 15 MIN (STAT)

## 2018-11-05 PROCEDURE — 25000003 PHARM REV CODE 250: Performed by: PHYSICIAN ASSISTANT

## 2018-11-05 PROCEDURE — 84100 ASSAY OF PHOSPHORUS: CPT

## 2018-11-05 PROCEDURE — 85025 COMPLETE CBC W/AUTO DIFF WBC: CPT

## 2018-11-05 PROCEDURE — 36415 COLL VENOUS BLD VENIPUNCTURE: CPT

## 2018-11-05 PROCEDURE — 83735 ASSAY OF MAGNESIUM: CPT

## 2018-11-05 PROCEDURE — 99239 HOSP IP/OBS DSCHRG MGMT >30: CPT | Mod: ,,, | Performed by: INTERNAL MEDICINE

## 2018-11-05 RX ORDER — MIDODRINE HYDROCHLORIDE 5 MG/1
5 TABLET ORAL 3 TIMES DAILY
Qty: 90 TABLET | Refills: 2 | Status: SHIPPED | OUTPATIENT
Start: 2018-11-05 | End: 2023-01-04

## 2018-11-05 RX ADMIN — DOCUSATE SODIUM 100 MG: 100 CAPSULE, LIQUID FILLED ORAL at 09:11

## 2018-11-05 RX ADMIN — THERA TABS 1 TABLET: TAB at 09:11

## 2018-11-05 RX ADMIN — MIDODRINE HYDROCHLORIDE 2.5 MG: 2.5 TABLET ORAL at 09:11

## 2018-11-05 RX ADMIN — ASPIRIN 81 MG: 81 TABLET, COATED ORAL at 09:11

## 2018-11-05 RX ADMIN — LEVOTHYROXINE SODIUM 50 MCG: 50 TABLET ORAL at 05:11

## 2018-11-05 RX ADMIN — PANTOPRAZOLE SODIUM 40 MG: 40 TABLET, DELAYED RELEASE ORAL at 09:11

## 2018-11-05 RX ADMIN — VITAMIN D, TAB 1000IU (100/BT) 1000 UNITS: 25 TAB at 09:11

## 2018-11-05 RX ADMIN — CETIRIZINE HYDROCHLORIDE 10 MG: 5 TABLET ORAL at 09:11

## 2018-11-05 NOTE — NURSING
Ambulated with   For 20 mins in  Hallway no c/o of dizziness,extra towel request as pt stated come morning she maybe d/c home.

## 2018-11-05 NOTE — NURSING
Pt with orders to d/c IV and to d/c home. Tolerated d/c of iv. No NSAD and SADN noted.Reveiwed discharge instructions, meds and appointments, dx, diet and activity level. Removed telemetry monitor. Pt transferred self to wheelchair and was  accompanied by transport and  at time of departure. Stable.

## 2018-11-05 NOTE — PLAN OF CARE
"Problem: Patient Care Overview  Goal: Plan of Care Review  PMH: DM, GERD,asthma, HLD, MAURO, hypothyroidism, migraines, thyroid storm    10/27: ED with AMS, abnormal gait, "seziure like" episodes   10/29: Core call to SICU for seizure like episode, continuous EEG  10/30: CTA cancelled per Neuro. D/C NS @100ml/hr.      Outcome: Ongoing (interventions implemented as appropriate)  No acute events throughout shift.  Vitals and assessment completed as ordered. Pt calm and cooperative. No complains of pain. Pt free from injury or falls      "

## 2018-11-05 NOTE — DISCHARGE INSTRUCTIONS
"Orthostatic hypotension (The Basics)    Written by the doctors and editors at [facility]  What is orthostatic hypotension? -- Orthostatic hypotension is a drop in blood pressure that can happen to some people when they stand up. This drop in blood pressure can make you feel dizzy or lightheaded. It can even make you pass out. Another term for orthostatic hypotension is "postural hypotension."  What are the symptoms of orthostatic hypotension? -- The symptoms all happen when you stand up after sitting or lying down.    They can include:  ?Feeling lightheaded or dizzy  ?Blurry or dim vision  ?Weakness  ?Fainting (this is called syncope)    What can cause orthostatic hypotension? -- There are many causes of orthostatic hypotension. It can happen if:  ?There is not enough fluid in your arteries - This can happen if you lose blood or if you are "dehydrated," meaning you do not have enough fluids. You can get dehydrated if:  You do not drink enough fluids  You have severe diarrhea or vomiting  You sweat a lot (for example, during exercise)  ?Your heart doesn't pump out enough blood  ?The nerves and hormones in your body that control the blood vessels are not working properly  ?You take certain medicines    In some people, orthostatic hypotension is tied to another problem, such as diabetes or Parkinson disease. But people who are otherwise healthy can have orthostatic hypotension, too. Older people are more likely than younger people to have it.    Should I see a doctor or nurse? -- Yes. If you often feel dizzy or like you might pass out when you stand up, see your doctor or nurse. If you do pass out (faint), you should let your doctor know.    Is there a test for orthostatic hypotension? -- Yes. There are a few tests that can help your doctor or nurse find out if orthostatic hypotension is causing your symptoms. The simplest test is to take your blood pressure and pulse while you are sitting or lying down and then again " "after you stand up. Other tests could include:  ?Blood tests to see if you have a condition called "anemia," which is when the body has too few red blood cells  ?Blood tests to check that your blood has the right chemical balance and that your fluid levels are in the right range  ?Tests to make sure your heart is pumping correctly    How is orthostatic hypotension treated? -- The first thing your doctor or nurse will want to do to "treat" your orthostatic hypotension is find out if it is caused by any medicines you take. If so, he or she might switch you to another medicine or lower your dose. Medicines that can cause orthostatic hypotension include those used to treat:  ?High blood pressure  ?Chest pain caused by heart disease (called "angina")  ?Depression    There are also medicines to treat orthostatic hypotension directly. If necessary, your doctor or nurse can prescribe 1 of these medicines.  Is there anything I can do on my own to feel better? -- Yes. There are a few things you can do to reduce the problems caused by orthostatic hypotension. They are listed below. But you should try these things only after talking to your doctor or nurse.    ?Stand up slowly and give your body time to adapt. This is especially important when you get out of bed in the morning. Start by sitting up and waiting a moment. Then swing your legs over the side of the bed and wait some more. When you do stand up, make sure you have something to hold onto in case you start to feel dizzy.    ?Avoid running, hiking, or doing anything that takes a lot of energy in hot weather. These things can make orthostatic hypotension worse.    ?Make sure you drink enough fluids, especially in hot weather.    ?Put blocks under the posts at the head of your bed. This will raise your head above your heart slightly.    ?Wear "compression" stockings. The ones that go to your waist are most helpful, but they are hard to wear.    ?Avoid drinking a lot of " alcohol.

## 2018-11-06 PROBLEM — R56.9 SEIZURE-LIKE ACTIVITY: Status: ACTIVE | Noted: 2018-11-06

## 2018-11-06 NOTE — PT/OT/SLP DISCHARGE
Occupational Therapy Discharge Summary    Vivian Peterson  MRN: [id number]   Principal Problem: Orthostatic syncope      Patient Discharged from acute Occupational Therapy on 11/2/18.  Please refer to prior OT note dated 11/6/18 for functional status.    Assessment:      Patient has not met goals.    Objective:     GOALS:   Multidisciplinary Problems     Occupational Therapy Goals        Problem: Occupational Therapy Goal    Goal Priority Disciplines Outcome Interventions   Occupational Therapy Goal     OT, PT/OT Ongoing (interventions implemented as appropriate)    Description:  Goals to be met by: 11/16  Patient will increase functional independence with ADLs by performing:    UE Dressing with [place] while seated.  LE Dressing with [place].  Grooming while standing at sink with [place] for oral hygiene.  Toileting from toilet with [place] for hygiene and clothing management.   Stand pivot transfers with Modified [place] and AD as needed to chair and to toilet.  Upper extremity exercise program x10 reps per handout, with independence for increased endurance to functional task.  Functional mobility with AD as needed for short household distance with no LOB with modified independence.                       Reasons for Discontinuation of Therapy Services  DC from acute care      Plan:     Patient Discharged to: Outpatient Therapy Services recommended     ESTELA Miller  11/6/2018

## 2018-11-06 NOTE — PLAN OF CARE
Plan is to discharge home with rolling walker.    Follow up appointment made with Dr. Andrez Valencia for 11/14/18 at 8:20 AM.       11/06/18 1628   Final Note   Assessment Type Final Discharge Note   Anticipated Discharge Disposition Home   Right Care Referral Info   Post Acute Recommendation No Care

## 2018-11-06 NOTE — DISCHARGE SUMMARY
"[facility] Hospital Medicine  Discharge Summary      Patient Name: Vivian Peterson  MRN: [id number]  Admission Date: 10/27/2018  Hospital Length of Stay: 9 days  Discharge Date and Time: 11/5/2018  3:32 PM  Attending Physician: Rubi Aguila MD  Discharging Provider: Rubi Aguila MD  Primary Care Provider: Provider [facility] Medicine Team: [facility] MED G Rubi Aguila MD    HPI:   Patient is a 48 year old lady with a h/o migraines, prediabetes on Metformin, GERD, asthma, HLD, hypothyroidism secondary to Hashimoto's, and MAURO.  She was transferred from [facility] where she presented with L sided weakness, numbness, and "staring spells."   at bedside assists with HPI.  Patient began having episodes of L sided weakness, gait disturbance (L>R), tremors, "catatonic episodes," and syncope in February.  Patient was living in [place] at that time and had been taken off her thyroid medications by her PCP.  She was admitted to the hospital and was told she had thyroid storm.  On discharge she was restarted on her medication.  However, patient continued to have episodes.  She was seen by neurology, but her neurologist moved and she was lost to follow-up.  She has had four episodes since that time.  Her most recent episode was on September 22, when she presented to [facility] with syncope and gait disturbance.  Her work-up, including MRI brain, CT head, and LP, was unremarkable.  She was discharged on a higher dose of Topamax (100mg qAM and 150mg qHS).  This evening she presented to OSH with abnormal gait and episodes of "staring off" when she is not able to communicate.  Episodes last about ten minutes.  She had one event while in the ER prior to transfer.  On exam, patient is oriented to situation, place, and time.  When asked her full name, she is unable to answer; however she was able to answer when asked what her first name was and what " "her last name was separately.  She denies chest pain, complains of chronic SOB secondary to asthma.  Denies dizziness, palpitations, fever/chills, N/V/D, abdominal pain.          * No surgery found *      Hospital Course:   Ms. Peterson was admitted to Hospital Medicine after transfer from [facility] for evaluation of seizure-like activity. Neurology was consulted. She underwent MRI and CT which were negative for stroke, showed only "right frontal lobe developmental venous anomaly" on MRI which Neurology did not feel explained her symptoms. Endocrine and nutritional workup have been normal. Her TSH/FT4 are stable on her current dose of levothyroxine. US thyroid was normal. On 10/29, she experienced a seizure-like episode described as "staring spell with jerking movements of the head and neck lasting >30 minutes", rapid response was called, and she was transferred to the Neuro ICU. EEG was not recording during the episode. EEG was started and she experienced no further episodes in the ICU.    She was transferred back to Hospital Medicine on 10/30. On the floor, she suffered a recurrent episode of seizure-like activity, preceded by light-headedness. EEG showed no evidence of epileptiform activity. Neurology reassured the patient that her symptoms were not seizures. Additional review of her history revealed that her symptoms have often been precipitated by palpitations with rapid heart rate as well as light-headedness, and that she had a long history of orthostatic dizziness and syncope, as well as a history of tachyarrhythmia previously treated with metoprolol. Her orthostatic vitals were positive. Cardiac workup including ECG, echocardiogram, and telemetry were normal. She showed no evidence of dysrhythmia. US carotids showed mild stenosis bilaterally <40%, but no abnormality at the carotid bifurcation. She is already on a daily aspirin.     Given her frequent orthostatic syncope and positive orthostatic vitals, her episodes " are highly suspected to be related to orthostasis and cerebral hypoxia in the setting of chronically low blood pressure. She was instructed to increase her oral fluid and salt intake in order to compensate for her low blood pressure, with modest improvement in her orthostatic symptoms. She was started on midodrine, with excellent clinical response. Her telemetry in-house was normal, but she should have outpatient follow up with Cardiology for further clarification of her history of dysrhythmia and likely Holter or 30 day monitor to capture any episodes. She will additionally need outpatient tilt table testing. Her topirimate and amitriptyline were stopped at the patient's request over concern for contribution to her symptoms. She did not have any migraines while hospitalized.     Consults:   Consults (From admission, onward)        Status Ordering Provider     Inpatient consult to Neurology  Once     Provider:  (Not yet assigned)    EMMA Morel          Final Active Diagnoses:    Diagnosis Date Noted POA    PRINCIPAL PROBLEM:  Orthostatic syncope [I95.1] 10/26/2018 Yes    Migraines [G43.909]  Yes     Chronic    Asthma [J45.909]  Yes     Chronic    Prediabetes [R73.03] 10/27/2018 Yes     Chronic    MAURO (obstructive sleep apnea) [G47.33]  Yes    Seizure-like activity [R56.9] 11/06/2018 Yes    Altered mental status [R41.82]  Yes    Hypothyroidism due to Hashimoto's thyroiditis [E03.8, E06.3]  Yes     Chronic    HLD (hyperlipidemia) [E78.5]  Yes     Chronic    GERD (gastroesophageal reflux disease) [K21.9]  Yes     Chronic      Problems Resolved During this Admission:       Discharged Condition: good    Disposition: Home or Self Care    Follow Up:  Follow-up Information     Dr. Andrez Valencia On 11/14/2018.    Why:  at 8:20 AM. To discuss referral for Tilt Table Testing  Contact information:  [address], [place], [place], [phone]           Rafael Nielsen MD.    Specialty:   "Cardiology  Why:  To talk about outpatient heart monitoring  Contact information:  [address] [place] [address]  [place] [address]  [phone]                 Patient Instructions:      WALKER FOR HOME USE     Order Specific Question Answer Comments   Type of Walker: Andre (4'4"-5'7")    With wheels? Yes    Height: 5' (1.524 m)    Weight: 67 kg (147 lb 12.8 oz)    Length of need (1-99 months): 99    Does patient have medical equipment at home? none    Please check all that apply: Patient's condition impairs ambulation.    Vendor: [facility]    Expected Date of Delivery: 11/5/2018      Ambulatory Referral to Physical/Occupational Therapy   Referral Priority: Routine Referral Type: Physical Medicine   Referral Reason: Specialty Services Required   Requested Specialty: Occupational Therapy   Number of Visits Requested: 1     Ambulatory Referral to Physical/Occupational Therapy   Referral Priority: Routine Referral Type: Physical Medicine   Referral Reason: Specialty Services Required   Requested Specialty: Physical Therapy   Number of Visits Requested: 1     Diet Adult Regular     Notify your health care provider if you experience any of the following:  persistent dizziness, light-headedness, or visual disturbances     Activity as tolerated       Significant Diagnostic Studies:      Folate  11.8  B12  438  MMA  PENDING  Thiamine 59  Vit D  35  Vit B6  10  A1c  5.5%  TSH  2.1  FT4  0.98  RPR  Negative      BMP  Lab Results   Component Value Date     11/05/2018    K 4.1 11/05/2018     11/05/2018    CO2 26 11/05/2018    BUN 13 11/05/2018    CREATININE 0.8 11/05/2018    CALCIUM 9.3 11/05/2018    ANIONGAP 7 (L) 11/05/2018    ESTGFRAFRICA >60.0 11/05/2018    EGFRNONAA >60.0 11/05/2018       Echocardiogram 11/1/18  CONCLUSIONS     1 - Normal left ventricular systolic function (EF 60-65%).     2 - No wall motion abnormalities.     3 - Normal left ventricular diastolic function.     4 - Normal right ventricular " systolic function .     5 - Tachycardia with small collapsing IVC    ECG 11/1/18  Normal sinus rhythm  Nonspecific T wave abnormality  Abnormal ECG  No previous ECGs available    US Carotid Bilat  No evidence of a hemodynamically significant carotid bifurcation stenosis.  Atherosclerosis of the carotid arteries, resulting in 1-39% stenosis of the internal carotid arteries bilaterally.    US Thyroid  The thyroid is normal in size.  Right lobe of the thyroid measures 4.0 x 1.7 x 1.3 cm.  Left lobe of the thyroid measures 3.7 x 1.1 x 1.2 cm.  Normal thyroid parenchyma.  Normal thyroid vascularity bilaterally.  No nodules.  Cervical lymph nodes demonstrate normal morphology and size.    MRI Brain 10/27/18  1. No acute infarction or intracranial hemorrhage.  2. Right frontal lobe developmental venous anomaly.    MRI C-Spine   1. Mild cervical degenerative changes contributing to mild right-sided neural foraminal narrowing at C4-C5.  No spinal canal stenosis.     MRI T-spine   1. No MR imaging findings to account for the reported history.        Pending Diagnostic Studies:     Procedure Component Value Units Date/Time    Methylmalonic acid, serum [[id number]] Collected:  11/03/18 0843    Order Status:  Sent Lab Status:  In process Updated:  11/03/18 0901    Specimen:  Blood          Medications:  Reconciled Home Medications:      Medication List      START taking these medications    midodrine 5 MG Tab  Commonly known as:  PROAMATINE  Take 1 tablet (5 mg total) by mouth 3 (three) times daily. Please contact primary physician for additional refills.        CHANGE how you take these medications    omeprazole 40 MG capsule  Commonly known as:  PRILOSEC  Take 40 mg by mouth once daily.  What changed:  Another medication with the same name was removed. Continue taking this medication, and follow the directions you see here.        CONTINUE taking these medications    albuterol 90 mcg/actuation inhaler  Commonly known as:   PROVENTIL/VENTOLIN HFA  Inhale 2 puffs into the lungs every 6 (six) hours as needed for Wheezing. Rescue     aspirin 81 MG EC tablet  Commonly known as:  ECOTRIN  Take 81 mg by mouth once daily.     atorvastatin 20 MG tablet  Commonly known as:  LIPITOR  Take 20 mg by mouth every evening.     fluticasone-salmeterol 250-50 mcg/dose 250-50 mcg/dose diskus inhaler  Commonly known as:  ADVAIR  Inhale 1 puff into the lungs 2 (two) times daily. Controller     levothyroxine 50 MCG tablet  Commonly known as:  SYNTHROID  Take 50 mcg by mouth before breakfast.     linaclotide 290 mcg Cap  Commonly known as:  LINZESS  Take 290 mcg by mouth every other day.     loratadine 10 mg tablet  Commonly known as:  CLARITIN  Take 10 mg by mouth once daily.     metFORMIN 500 MG tablet  Commonly known as:  GLUCOPHAGE  Take 500 mg by mouth 2 (two) times daily with meals.     montelukast 10 mg tablet  Commonly known as:  SINGULAIR  Take 10 mg by mouth every evening.     multivitamin capsule  Take 1 capsule by mouth once daily.     vitamin D 1000 units Tab  Commonly known as:  VITAMIN D3  Take 1,000 Units by mouth once daily.        STOP taking these medications    amitriptyline 100 MG tablet  Commonly known as:  ELAVIL     topiramate 100 MG tablet  Commonly known as:  TOPAMAX            Indwelling Lines/Drains at time of discharge:   Lines/Drains/Airways          None          Time spent on the discharge of patient: >35 minutes  Patient was seen and examined on the date of discharge and determined to be suitable for discharge.         Rubi Aguila M.D., M.P.H.  Department of Hospital Medicine  [facility]  (pager) [phone] (spect) [phone]

## 2018-11-07 ENCOUNTER — PATIENT OUTREACH (OUTPATIENT)
Dept: ADMINISTRATIVE | Facility: CLINIC | Age: 48
End: 2018-11-07

## 2018-11-07 NOTE — PATIENT INSTRUCTIONS
Orthostatic Low Blood Pressure (Hypotension)  A blood pressure reading is made up of 2 numbers There is a top number over a bottom number. The top number is the systolic pressure. The bottom number is the diastolic pressure. A normal blood pressure is a systolic pressure less than 120 over a diastolic pressure less than 80. Low blood pressure (hypotension) is a blood pressure that is less than what is normal for you.  Orthostatic hypotension is a type of low blood pressure that occurs only when you change position from lying to standing. It can cause dizziness, lightheadedness, or fainting.  Some medicines can cause orthostatic hypotension. These include:  · High blood pressure medicines  · Water pills (diuretics)  · Some heart medicines  · Some antidepressants  · Pain, anxiety, sedative, and sleeping medicines  Other causes include:  · Dehydration from vomiting, diarrhea, or not getting enough fluids  · Severe infection  · High fever  · Blood loss, such as bleeding from the stomach or intestines  · Neurological diseases that impair the autonomic nervous system  Treatment will depend on what is causing your low blood pressure.  Home care  Follow these guidelines when caring for yourself at home:  · Rest until your symptoms get better.  · Change positions slowly from lying to standing. When getting out of bed, sit on the side of the bed with your legs down for at least 30 seconds before standing. This gives your body time to adjust to the position change.  · Follow the treatment plan described by your healthcare provider.  Follow-up care  Follow up with your healthcare provider, or as advised.  When to seek medical advice  Call your healthcare provider right away if any of these occur:  · Dizziness, lightheadedness, or fainting  · Black or red color in your stools or vomit  · Diarrhea or vomiting that doesnt go away  · You arent able to eat or drink  · Fever of 100.4°F (38°C) or higher, or as directed by your  healthcare provider  · Burning when you urinate  · Foul-smelling urine  Date Last Reviewed: 12/1/2016  © 7366-7213 [employer]. [address] [place] [address], [place] [address]. All rights reserved. This information is not intended as a substitute for professional medical care. Always follow your healthcare professional's instructions.        Fainting: Uncertain Cause  Fainting (syncope) is a temporary loss of consciousness, which is often associated with a loss of postural tone. There are other causes of fainting, too. Its also called passing out. It occurs when blood flow to the brain is less than normal. Near-fainting (near-syncope) is very similar to fainting, but you dont fully pass out.  Common minor causes of fainting include:  · Sudden fear  · Pain  · Nausea  · Emotional stress  · Overexertion  Suddenly standing up after sitting or lying for a long time can also cause fainting.  More serious causes of fainting include:  · Very slow or very fast heartbeat (arrhythmia)  · Other types of heart disease, such as heart valve disease or coronary artery disease  · Dehydration  · Loss of blood  · Seizure  · Stroke  · Ruptured blood vessel in the brain  Taking too much high blood pressure medicine can also cause low blood pressure and fainting.  Your healthcare provider does not know the exact cause of your fainting. But the tests today did not show any of the serious causes of fainting. Sometimes you may need more tests to find out if you have a serious problem. Thats why its important to follow up with your provider as advised.  Home care  Follow these guidelines when caring for yourself at home:  · Rest today. You may go back to your normal activities when you are feeling back to normal. It is best to stay with someone who can check on you for the next 24 hours to watch for another episode of fainting.  · If you become lightheaded or dizzy, lie down right away and try to prop your feet above the level of  your head. Or sit with your head between your knees.  · Because the provider doesnt know the exact cause of your fainting or near-fainting spell, its possible for you to have another spell without warning. Because of this, dont drive a car or operate dangerous equipment. Dont take a bath alone. Use a shower instead. Dont swim alone until your healthcare provider says that you are no longer in danger of having another fainting spell.  Follow-up care  Follow up with your healthcare provider, or as advised.  When to seek medical advice  Call your healthcare provider right away if any of these occur:  · Another fainting spell thats not explained by the common causes listed above  · Pain in your chest, arm, neck, jaw, back, or abdomen  · Shortness of breath  · Severe headache or seizure  · Blood in vomit or stools (black or red color)  · Unexpected vaginal bleeding  · Your heart beats very rapidly, very slowly, or irregularly (palpitations)  Also call your provider if you have signs of stroke:  · Weakness in an arm or leg or on one side of the face  · Difficulty speaking or seeing  · Extreme drowsiness, confusion, dizziness, or fainting  Date Last Reviewed: 12/1/2016  © 0080-9367 [employer]. [address] [place] [address], [place] [address]. All rights reserved. This information is not intended as a substitute for professional medical care. Always follow your healthcare professional's instructions.

## 2018-11-08 LAB — METHYLMALONATE SERPL-SCNC: 0.18 UMOL/L

## 2019-02-15 NOTE — PROCEDURES
DATE OF SERVICE:  11/01/2018    EEG NUMBER:  [id number].    REQUESTED BY:  Dr. Dillon.    LOCATION OF SERVICE:  [facility].    EPILEPSY MONITORING UNIT  EEG/VIDEO TELEMETRY REPORT    METHODOLOGY:  Electroencephalographic (EEG) is recorded with electrodes placed   according to the International 10-20 placement system.  Thirty Two (32) channels   of digital signal, including T1 and T2 electrodes, are simultaneously recorded   from the scalp and may also include EKG, EMG and/or eye movement monitors.    Recording band pass was 0.1 to 512 Hz.  Digital video recording of the patient   is simultaneously recorded with the EEG.  The patient is instructed to report   clinical symptoms which may occur during the recording session.  EEG and video   recording are stored and archived in digital format. Activation procedures,   which include photic stimulation, hyperventilation and instructing patients to   perform simple tasks, are done in selected patients.    The EEG is displayed on a monitor screen and can be reformatted into different   montages for evaluation.  The entire recoding is submitted for computer-assisted   analysis to detect spike and electrographic seizure activity.  The entire   recording is visually reviewed, and the times identified by computer analysis as   being spikes or seizures are reviewed again.  Compressed spectral analysis   (CSA) is also performed on the activity recorded from each individual channel.    This is displayed as a power display of frequencies from 0 to 30 Hz over time.     The CSA analysis is done and displayed continuously.  This is reviewed for   asymmetries in power between homologous areas of the scalp and for presence of   changes in power which can be seen when seizures occur.  Sections of suspected   abnormalities on the CSA are then compared with the original EEG recording.    Open[employer] software was also utilized in the review of this study.  This software   suite analyzes the EEG  recording in multiple domains.  Coherence and rhythmicity   are computed to identify EEG sections which may contain organized seizures.    Each channel undergoes analysis to detect presence of spike and sharp waves   which have special and morphological characteristics of epileptic activity.  The   routine EEG recording is converted from special into frequency domain.  This is   then displayed comparing homologous areas to identify areas of significant   asymmetry.  Algorithm to identify non-cortically generated artifact is used to   separate artifact from the EEG.    RECORDING TIMES:  Start on 11/01/2018 at 07:00 and end at 14:29.  A total of 7 hours and 29 minutes of EEG video monitoring was obtained.    SEIZURES/EVENTS RECORDED:  Event 1:  Start on 11/01/2018 at 10:48:08 and end at 11:21:28.    EEG FINDINGS:  Interictal:  In the waking state, a low-amplitude fairly rhythmic posterior   dominant rhythm of 10 to 11 Hz was seen in the occipital leads bilaterally.    There was a mixture of mid and upper range beta seen diffusely.  It was a little   bit more evident in the mid and frontal region.  With sleep, the expected   stages and cycles were noted.  Waking and sleeping background was symmetrical   and there were no lateralized or focal changes and no spike or sharp wave   activity seen.    ACTIVATION PROCEDURES:  Hyperventilation:  Not performed.    Intermittent photic stimulation:  Not performed.    Ictal:  During the clinical event, the electrocortical activity did not change   and remained same as was previously recorded when the patient was awake and   interactive.    CLINICAL DESCRIPTION:  The patient was lying quietly and interacting with her   family and nursing staff.  At 10:48:08, the patient stopped being interactive   and maintained a continuous motionless stare looking slightly to the left.    There were no movements of her limbs, trunk and no change in facial expression.    Family member repeatedly  said can you hear me and the patient made no response.    The nurse checked vital signs and interacted some with the patient and there   was no response.  At the end, the patient began having normal coordinated   movements.    IMPRESSION:  Normal EEG, but the patient had an episode of being unresponsive,   but awake with a blank stare.  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      RR/HN  dd: 02/14/2019 16:49:12 (CST)  td: 02/15/2019 00:25:57 (CST)  Doc ID   #[id number]  Job ID #[id number]    CC:

## 2022-09-13 DIAGNOSIS — R09.A2 GLOBUS SENSATION: Primary | ICD-10-CM

## 2022-09-13 DIAGNOSIS — R13.10 DIFFICULTY SWALLOWING SOLIDS: ICD-10-CM

## 2023-01-04 ENCOUNTER — OFFICE VISIT (OUTPATIENT)
Dept: GASTROENTEROLOGY | Facility: CLINIC | Age: 53
End: 2023-01-04
Payer: COMMERCIAL

## 2023-01-04 ENCOUNTER — TELEPHONE (OUTPATIENT)
Dept: GASTROENTEROLOGY | Facility: CLINIC | Age: 53
End: 2023-01-04

## 2023-01-04 VITALS
HEIGHT: 60 IN | DIASTOLIC BLOOD PRESSURE: 80 MMHG | OXYGEN SATURATION: 96 % | WEIGHT: 142.38 LBS | SYSTOLIC BLOOD PRESSURE: 122 MMHG | HEART RATE: 83 BPM | BODY MASS INDEX: 27.95 KG/M2

## 2023-01-04 DIAGNOSIS — Z86.010 PERSONAL HISTORY OF COLONIC POLYPS: ICD-10-CM

## 2023-01-04 DIAGNOSIS — K21.9 GASTROESOPHAGEAL REFLUX DISEASE, UNSPECIFIED WHETHER ESOPHAGITIS PRESENT: ICD-10-CM

## 2023-01-04 DIAGNOSIS — R13.19 ESOPHAGEAL DYSPHAGIA: Primary | ICD-10-CM

## 2023-01-04 DIAGNOSIS — K59.04 CHRONIC IDIOPATHIC CONSTIPATION: ICD-10-CM

## 2023-01-04 PROCEDURE — 99204 PR OFFICE/OUTPT VISIT, NEW, LEVL IV, 45-59 MIN: ICD-10-PCS | Mod: S$GLB,,, | Performed by: INTERNAL MEDICINE

## 2023-01-04 PROCEDURE — 99204 OFFICE O/P NEW MOD 45 MIN: CPT | Mod: S$GLB,,, | Performed by: INTERNAL MEDICINE

## 2023-01-04 RX ORDER — MIDODRINE HYDROCHLORIDE 5 MG/1
5 TABLET ORAL 2 TIMES DAILY WITH MEALS
COMMUNITY
End: 2023-07-26

## 2023-01-04 RX ORDER — SODIUM, POTASSIUM,MAG SULFATES 17.5-3.13G
1 SOLUTION, RECONSTITUTED, ORAL ORAL ONCE
Qty: 1 KIT | Refills: 0 | Status: SHIPPED | OUTPATIENT
Start: 2023-01-04 | End: 2023-01-04

## 2023-01-04 RX ORDER — OMEPRAZOLE 20 MG/1
20 CAPSULE, DELAYED RELEASE ORAL NIGHTLY
COMMUNITY
End: 2023-03-02 | Stop reason: SDUPTHER

## 2023-01-04 RX ORDER — POLYETHYLENE GLYCOL 3350, SODIUM SULFATE ANHYDROUS, SODIUM BICARBONATE, SODIUM CHLORIDE, POTASSIUM CHLORIDE 236; 22.74; 6.74; 5.86; 2.97 G/4L; G/4L; G/4L; G/4L; G/4L
4 POWDER, FOR SOLUTION ORAL ONCE
Qty: 4000 ML | Refills: 0 | Status: SHIPPED | OUTPATIENT
Start: 2023-01-04 | End: 2023-01-04

## 2023-01-04 NOTE — PATIENT INSTRUCTIONS
Schedule EGD/Colonoscopy.  Begin taking Linzess 145 mcg    Please notify my office if you have not been contacted within two weeks after any procedures, submitting any samples (biopsies, blood, stool, urine, etc.) or after any imaging (X-ray, CT, MRI, etc.).

## 2023-01-04 NOTE — TELEPHONE ENCOUNTER
[facility]  [address] [place] [address]  Phone: [phone]  Fax: [phone]    History & Physical         Provider: Dr. Bartolo Pang    Patient Name: Vivian GALVIN (age):1970  52 y.o.           Gender: female   Phone: [phone]     Referring Physician: Kyler Tate     Vital Signs:   Height - 5'  Weight - 142 lb  BMI -  27.81    Plan: EGD w/ DIL / Colonoscopy @ [facility]     Encounter Diagnoses   Name Primary?    Esophageal dysphagia Yes    Personal history of colonic polyps     Gastroesophageal reflux disease, unspecified whether esophagitis present            History:      Past Medical History:   Diagnosis Date    Asthma     BMI 27.0-27.9,adult     Chronic constipation     Colon polyp     GERD (gastroesophageal reflux disease)     HLD (hyperlipidemia)     Hypothyroidism due to Hashimoto's thyroiditis     Migraines     MAURO (obstructive sleep apnea)     Prediabetes     Seizures     due to stress and anxiety    Unspecified hemorrhoids       Past Surgical History:   Procedure Laterality Date    APPENDECTOMY N/A     CHOLECYSTECTOMY N/A     COLONOSCOPY N/A     ENDOSCOPY N/A     SHOULDER SURGERY Bilateral     TONSILLECTOMY      TOTAL ABDOMINAL HYSTERECTOMY N/A       Medication List with Changes/Refills   Current Medications    ALBUTEROL (PROVENTIL/VENTOLIN HFA) 90 MCG/ACTUATION INHALER    Inhale 2 puffs into the lungs every 6 (six) hours as needed for Wheezing. Rescue    ASPIRIN (ECOTRIN) 81 MG EC TABLET    Take 81 mg by mouth once daily.    ATORVASTATIN (LIPITOR) 20 MG TABLET    Take 20 mg by mouth every evening.    FLUTICASONE-SALMETEROL 250-50 MCG/DOSE (ADVAIR) 250-50 MCG/DOSE DISKUS INHALER    Inhale 1 puff into the lungs 2 (two) times daily. Controller    LEVOTHYROXINE (SYNTHROID) 50 MCG TABLET    Take 50 mcg by mouth before breakfast.    LINACLOTIDE (LINZESS) 145 MCG CAP CAPSULE    Take  1 capsule (145 mcg total) by mouth before breakfast.    MIDODRINE (PROAMATINE) 5 MG TAB    Take 5 mg by mouth 2 (two) times daily with meals.    OMEPRAZOLE (PRILOSEC) 20 MG CAPSULE    Take 20 mg by mouth every evening.    OMEPRAZOLE (PRILOSEC) 40 MG CAPSULE    Take 40 mg by mouth once daily.      Review of patient's allergies indicates:   Allergen Reactions    Penicillins Anaphylaxis    Sulfa (sulfonamide antibiotics) Anaphylaxis    Clarithromycin Rash      Family History   Problem Relation Age of Onset    Heart attack Mother     Colon cancer Paternal Aunt     Throat cancer Paternal Aunt     Throat cancer Paternal Uncle     Pancreatic cancer Paternal Grandmother     Stomach cancer Neg Hx     Liver cancer Neg Hx     Liver disease Neg Hx     Esophageal cancer Neg Hx     Crohn's disease Neg Hx     Ulcerative colitis Neg Hx       Social History     Tobacco Use    Smoking status: Never    Smokeless tobacco: Never   Substance Use Topics    Alcohol use: Never    Drug use: Never        Physical Examination:     General Appearance:___________________________  HEENT: _____________________________________  Abdomen:____________________________________  Heart:________________________________________  Lungs:_______________________________________  Extremities:___________________________________  Skin:_________________________________________  Endocrine:____________________________________  Genitourinary:_________________________________  Neurological:__________________________________      Patient has been evaluated immediately prior to sedation and is medically cleared for endoscopy with IVCS as an ASA class: ______      Physician Signature: _________________________       Date: ________  Time: ________

## 2023-01-04 NOTE — PROGRESS NOTES
Clinic Note    Reason for visit:  The primary encounter diagnosis was Esophageal dysphagia. Diagnoses of Chronic idiopathic constipation, Gastroesophageal reflux disease, unspecified whether esophagitis present, and Personal history of colonic polyps were also pertinent to this visit.    PCP: Kyler Tate   [address] / [place] [address]    HPI:  This is a 52 y.o. female who is here to establish care. Patient reports dysphagia with solids. Had EGD w/dilation and colonoscopy in 2018 and had polyps with 2 hemorrhoids that were banding. Reports constipation/bloating, tried Linzess 290 mcg but it caused diarrhea. She has h/o bowel obstruction. Hysterectomy is only surgery she has had.  She had allergy testing and has a lot of environmental allergies and gets allergy injections once a week. She drinks lactose free milk. She has avoided gluten in the past and she did well but she is currently not doing gluten free diet. She was born at 27 weeks. She has h/o seizures due to stress and anxiety and states last seizure was in 2020.        Review of Systems   Constitutional:  Negative for chills, diaphoresis, fatigue, fever and unexpected weight change.   HENT:  Negative for mouth sores, nosebleeds, postnasal drip, sore throat, trouble swallowing and voice change.    Eyes:  Negative for pain, discharge and eye dryness.   Respiratory:  Negative for apnea, cough, choking, chest tightness, shortness of breath and wheezing.    Cardiovascular:  Negative for chest pain, palpitations, leg swelling and claudication.   Gastrointestinal:  Negative for abdominal distention, abdominal pain, anal bleeding, blood in stool, change in bowel habit, constipation, diarrhea, nausea, rectal pain, vomiting, reflux, fecal incontinence and change in bowel habit.   Genitourinary:  Negative for bladder incontinence, difficulty urinating, dysuria, flank pain, frequency and hematuria.   Musculoskeletal:  Negative for arthralgias, back  pain, joint swelling and joint deformity.   Integumentary:  Negative for color change, rash and wound.   Allergic/Immunologic: Negative for environmental allergies and food allergies.   Neurological:  Negative for seizures, facial asymmetry, speech difficulty, weakness, headaches and memory loss.   Hematological:  Negative for adenopathy. Does not bruise/bleed easily.   Psychiatric/Behavioral:  Negative for agitation, behavioral problems, confusion, hallucinations and sleep disturbance.       Past Medical History:   Diagnosis Date    Asthma     Chronic constipation     Colon polyp     GERD (gastroesophageal reflux disease)     HLD (hyperlipidemia)     Hypothyroidism due to Hashimoto's thyroiditis     Migraines     MAURO (obstructive sleep apnea)     Prediabetes     Seizures     due to stress and anxiety    Unspecified hemorrhoids      Past Surgical History:   Procedure Laterality Date    APPENDECTOMY N/A     CHOLECYSTECTOMY N/A     COLONOSCOPY N/A     ENDOSCOPY N/A     SHOULDER SURGERY Bilateral     TONSILLECTOMY      TOTAL ABDOMINAL HYSTERECTOMY N/A      Family History   Problem Relation Age of Onset    Heart attack Mother     Colon cancer Paternal Aunt     Throat cancer Paternal Aunt     Throat cancer Paternal Uncle     Pancreatic cancer Paternal Grandmother     Stomach cancer Neg Hx     Liver cancer Neg Hx     Liver disease Neg Hx     Esophageal cancer Neg Hx     Crohn's disease Neg Hx     Ulcerative colitis Neg Hx      Social History     Tobacco Use    Smoking status: Never    Smokeless tobacco: Never   Substance Use Topics    Alcohol use: Never    Drug use: Never     Review of patient's allergies indicates:   Allergen Reactions    Penicillins Anaphylaxis    Sulfa (sulfonamide antibiotics) Anaphylaxis    Clarithromycin Rash        Medication List with Changes/Refills   New Medications    LINACLOTIDE (LINZESS) 145 MCG CAP CAPSULE    Take 1 capsule (145 mcg total) by mouth before breakfast.    POLYETHYLENE GLYCOL  (GOLYTELY) 236-22.74-6.74 -5.86 GRAM SUSPENSION    Take 4,000 mLs (4 L total) by mouth once. for 1 dose   Current Medications    ALBUTEROL (PROVENTIL/VENTOLIN HFA) 90 MCG/ACTUATION INHALER    Inhale 2 puffs into the lungs every 6 (six) hours as needed for Wheezing. Rescue    ASPIRIN (ECOTRIN) 81 MG EC TABLET    Take 81 mg by mouth once daily.    ATORVASTATIN (LIPITOR) 20 MG TABLET    Take 20 mg by mouth every evening.    FLUTICASONE-SALMETEROL 250-50 MCG/DOSE (ADVAIR) 250-50 MCG/DOSE DISKUS INHALER    Inhale 1 puff into the lungs 2 (two) times daily. Controller    LEVOTHYROXINE (SYNTHROID) 50 MCG TABLET    Take 50 mcg by mouth before breakfast.    MIDODRINE (PROAMATINE) 5 MG TAB    Take 5 mg by mouth 2 (two) times daily with meals.    OMEPRAZOLE (PRILOSEC) 20 MG CAPSULE    Take 20 mg by mouth every evening.    OMEPRAZOLE (PRILOSEC) 40 MG CAPSULE    Take 40 mg by mouth once daily.   Discontinued Medications    LINACLOTIDE (LINZESS) 290 MCG CAP    Take 290 mcg by mouth every other day.    LORATADINE (CLARITIN) 10 MG TABLET    Take 10 mg by mouth once daily.    METFORMIN (GLUCOPHAGE) 500 MG TABLET    Take 500 mg by mouth 2 (two) times daily with meals.    MIDODRINE (PROAMATINE) 5 MG TAB    Take 1 tablet (5 mg total) by mouth 3 (three) times daily. Please contact primary physician for additional refills.    MONTELUKAST (SINGULAIR) 10 MG TABLET    Take 10 mg by mouth every evening.    MULTIVITAMIN CAPSULE    Take 1 capsule by mouth once daily.    VITAMIN D (VITAMIN D3) 1000 UNITS TAB    Take 1,000 Units by mouth once daily.         Vital Signs:  /80   Pulse 83   Ht 5' (1.524 m)   Wt 64.6 kg (142 lb 6.4 oz)   SpO2 96%   BMI 27.81 kg/m²         Physical Exam  Vitals reviewed.   Constitutional:       General: She is awake. She is not in acute distress.     Appearance: Normal appearance. She is well-developed. She is not ill-appearing, toxic-appearing or diaphoretic.   HENT:      Head: Normocephalic and  atraumatic.      Nose: Nose normal.      Mouth/Throat:      Mouth: Mucous membranes are moist.      Pharynx: Oropharynx is clear. No oropharyngeal exudate or posterior oropharyngeal erythema.   Eyes:      General: Lids are normal. Gaze aligned appropriately. No scleral icterus.        Right eye: No discharge.         Left eye: No discharge.      Extraocular Movements: Extraocular movements intact.      Conjunctiva/sclera: Conjunctivae normal.   Neck:      Trachea: Trachea normal.   Cardiovascular:      Rate and Rhythm: Normal rate and regular rhythm.      Pulses:           Radial pulses are 2+ on the right side and 2+ on the left side.   Pulmonary:      Effort: Pulmonary effort is normal. No respiratory distress.      Breath sounds: Normal breath sounds. No stridor. No wheezing or rhonchi.   Chest:      Chest wall: No tenderness.   Abdominal:      General: Bowel sounds are normal. There is no distension.      Palpations: Abdomen is soft. There is no fluid wave, hepatomegaly or mass.      Tenderness: There is no abdominal tenderness. There is no guarding or rebound.   Musculoskeletal:         General: No tenderness or deformity.      Cervical back: Full passive range of motion without pain and neck supple. No tenderness.      Right lower leg: No edema.      Left lower leg: No edema.   Lymphadenopathy:      Cervical: No cervical adenopathy.   Skin:     General: Skin is warm and dry.      Capillary Refill: Capillary refill takes less than 2 seconds.      Coloration: Skin is not cyanotic, jaundiced or pale.      Findings: No rash.   Neurological:      General: No focal deficit present.      Mental Status: She is alert and oriented to person, place, and time.      Cranial Nerves: No facial asymmetry.      Motor: No tremor.   Psychiatric:         Attention and Perception: Attention normal.         Mood and Affect: Mood and affect normal.         Speech: Speech normal.         Behavior: Behavior normal. Behavior is  cooperative.          All of the data above and below has been reviewed by myself and any further interpretations will be reflected in the assessment and plan.   The data includes review of external notes, and independent interpretation of lab results, procedures, x-rays, and imaging reports.      Assessment:  Esophageal dysphagia  -     Ambulatory Referral to External Surgery    Chronic idiopathic constipation  -     linaCLOtide (LINZESS) 145 mcg Cap capsule; Take 1 capsule (145 mcg total) by mouth before breakfast.  Dispense: 90 capsule; Refill: 3    Gastroesophageal reflux disease, unspecified whether esophagitis present  -     Ambulatory Referral to External Surgery    Personal history of colonic polyps  -     Ambulatory Referral to External Surgery    Other orders  -     Discontinue: sodium,potassium,mag sulfates (SUPREP BOWEL PREP KIT) 17.5-3.13-1.6 gram SolR; Take 177 mLs by mouth once. Take according to kit instructions but DO NOT eat breakfast the morning of procedure. for 1 dose  Dispense: 1 kit; Refill: 0  -     polyethylene glycol (GOLYTELY) 236-22.74-6.74 -5.86 gram suspension; Take 4,000 mLs (4 L total) by mouth once. for 1 dose  Dispense: 4000 mL; Refill: 0      Plan for esophageal/gastric/duodenal biopsies to rule out EE, H.pylori, and Celiac     Recommendations:  Schedule EGD/Colonoscopy at [facility] [place] with MiraLAX prep  Begin taking Linzess 145 mcg    Risks, benefits, and alternatives of medical management, any associated procedures, and/or treatment discussed with the patient. Patient given opportunity to ask questions and voices understanding. Patient has elected to proceed with the recommended care modalities as discussed.    Follow up in about 6 months (around 7/4/2023).    Order summary:  Orders Placed This Encounter   Procedures    Ambulatory Referral to External Surgery        Instructed patient to notify my office if they have not been contacted within two weeks after any procedures, submitting any  samples (biopsies, blood, stool, urine, etc.) or after any imaging (X-ray, CT, MRI, etc.).     Bartolo Pang MD    This document may have been created using a voice recognition transcribing system. Incorrect words or phrases may have been missed during proofreading. Please interpret accordingly or contact me for clarification.

## 2023-02-10 ENCOUNTER — OFFICE VISIT (OUTPATIENT)
Dept: NEUROLOGY | Facility: CLINIC | Age: 53
End: 2023-02-10
Payer: COMMERCIAL

## 2023-02-10 ENCOUNTER — LAB VISIT (OUTPATIENT)
Dept: LAB | Facility: HOSPITAL | Age: 53
End: 2023-02-10
Attending: PSYCHIATRY & NEUROLOGY
Payer: COMMERCIAL

## 2023-02-10 ENCOUNTER — PATIENT MESSAGE (OUTPATIENT)
Dept: NEUROLOGY | Facility: CLINIC | Age: 53
End: 2023-02-10

## 2023-02-10 VITALS
BODY MASS INDEX: 27.72 KG/M2 | DIASTOLIC BLOOD PRESSURE: 78 MMHG | SYSTOLIC BLOOD PRESSURE: 111 MMHG | WEIGHT: 141.19 LBS | HEART RATE: 74 BPM | HEIGHT: 60 IN

## 2023-02-10 DIAGNOSIS — R27.0 ATAXIA: ICD-10-CM

## 2023-02-10 DIAGNOSIS — G43.909 MIGRAINE WITHOUT STATUS MIGRAINOSUS, NOT INTRACTABLE, UNSPECIFIED MIGRAINE TYPE: Chronic | ICD-10-CM

## 2023-02-10 DIAGNOSIS — F43.10 PTSD (POST-TRAUMATIC STRESS DISORDER): Primary | ICD-10-CM

## 2023-02-10 DIAGNOSIS — R56.9 SEIZURE-LIKE ACTIVITY: ICD-10-CM

## 2023-02-10 DIAGNOSIS — R26.9 GAIT DISTURBANCE: ICD-10-CM

## 2023-02-10 LAB — TSH SERPL DL<=0.005 MIU/L-ACNC: 1.68 UIU/ML (ref 0.4–4)

## 2023-02-10 PROCEDURE — 99999 PR PBB SHADOW E&M-EST. PATIENT-LVL IV: CPT | Mod: PBBFAC,,, | Performed by: PSYCHIATRY & NEUROLOGY

## 2023-02-10 PROCEDURE — 99205 OFFICE O/P NEW HI 60 MIN: CPT | Mod: S$GLB,,, | Performed by: PSYCHIATRY & NEUROLOGY

## 2023-02-10 PROCEDURE — 86038 ANTINUCLEAR ANTIBODIES: CPT | Performed by: PSYCHIATRY & NEUROLOGY

## 2023-02-10 PROCEDURE — 36415 COLL VENOUS BLD VENIPUNCTURE: CPT | Performed by: PSYCHIATRY & NEUROLOGY

## 2023-02-10 PROCEDURE — 86255 FLUORESCENT ANTIBODY SCREEN: CPT | Mod: 59 | Performed by: PSYCHIATRY & NEUROLOGY

## 2023-02-10 PROCEDURE — 99999 PR PBB SHADOW E&M-EST. PATIENT-LVL IV: ICD-10-PCS | Mod: PBBFAC,,, | Performed by: PSYCHIATRY & NEUROLOGY

## 2023-02-10 PROCEDURE — 84443 ASSAY THYROID STIM HORMONE: CPT | Performed by: PSYCHIATRY & NEUROLOGY

## 2023-02-10 PROCEDURE — 99205 PR OFFICE/OUTPT VISIT, NEW, LEVL V, 60-74 MIN: ICD-10-PCS | Mod: S$GLB,,, | Performed by: PSYCHIATRY & NEUROLOGY

## 2023-02-10 NOTE — ASSESSMENT & PLAN NOTE
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

## 2023-02-10 NOTE — ASSESSMENT & PLAN NOTE
Diagnosed as likely [facility]ES after EMU visit shows no electrographic sz  Suggested address PTSD with a psychiatrist

## 2023-02-10 NOTE — PROGRESS NOTES
"MOVEMENT DISORDERS CLINIC NEW CONSULT NOTE    PCP/Referring Provider: Aaarefbhakti Self  No address on file  Date of Service: 2/10/2023    Chief Complaint: Ataxia    HPI: Vivian Peterson is a R HANDED 53 y.o. female with a medical issues significant for Hashimotos, Migraines, MAURO, Asthma, PNES, Orthostasis, coming for ataxia workup. Patient notes trouble walking progressive over her time. At this point needs to use a shopping cart to stay upright at grocery stores. Struggles with stairs. Typcially walks unassisted. Falls once a day. Runs into doorframes. Furniture surfs. Not enrolled in PT. In 2018 noes she had a "mini stroke" episode - 6 hrs could not talk and had R sided hemiparesiss which she later recovered from. Patient had delayed walking milestones and speaking.  No dysarthria noted.   Migraines make her symptoms worse.  EMU admit revealed no electrographic evidence of seizures with concern for PNES.  Mother had a movement disorder - bedbound by 66 but also had strokes  Daughter started falling at birth  Both her daughter and mother had migraines  Some word finding issues  No tremor  At times foot inversions    MRI 2018 was NL  EMU admit for staring spells 2018 NL    Medication history:  -Failed topamax and elavil for migraine control    Neuroleptic exposure:  -None    Review of Systems:   Review of Systems   Constitutional:  Negative for fever.   HENT:  Negative for congestion.    Eyes:  Negative for double vision.   Respiratory:  Negative for cough and shortness of breath.    Cardiovascular:  Negative for chest pain and leg swelling.   Gastrointestinal:  Negative for nausea.   Genitourinary:  Negative for dysuria.   Musculoskeletal:  Positive for falls.   Skin:  Negative for rash.   Neurological:  Negative for tremors, speech change and headaches.   Psychiatric/Behavioral:  Negative for depression. The patient is nervous/anxious.        Current Medications:  Outpatient Encounter Medications as of 2/10/2023 "   Medication Sig Dispense Refill    albuterol (PROVENTIL/VENTOLIN HFA) 90 mcg/actuation inhaler Inhale 2 puffs into the lungs every 6 (six) hours as needed for Wheezing. Rescue      aspirin (ECOTRIN) 81 MG EC tablet Take 81 mg by mouth once daily.      atorvastatin (LIPITOR) 20 MG tablet Take 20 mg by mouth every evening.      fluticasone-salmeterol 250-50 mcg/dose (ADVAIR) 250-50 mcg/dose diskus inhaler Inhale 1 puff into the lungs 2 (two) times daily. Controller      levothyroxine (SYNTHROID) 50 MCG tablet Take 50 mcg by mouth before breakfast.      linaCLOtide (LINZESS) 145 mcg Cap capsule Take 1 capsule (145 mcg total) by mouth before breakfast. 90 capsule 3    midodrine (PROAMATINE) 5 MG Tab Take 5 mg by mouth 2 (two) times daily with meals.      omeprazole (PRILOSEC) 20 MG capsule Take 20 mg by mouth every evening.      omeprazole (PRILOSEC) 40 MG capsule Take 40 mg by mouth once daily.       No facility-administered encounter medications on file as of 2/10/2023.       Past Medical History:  Patient Active Problem List   Diagnosis    Orthostatic syncope    Prediabetes    Hypothyroidism due to Hashimoto's thyroiditis    HLD (hyperlipidemia)    GERD (gastroesophageal reflux disease)    Asthma    Migraines    MAURO (obstructive sleep apnea)    Altered mental status    Seizure-like activity    Gait disturbance       Past Surgical History:  Past Surgical History:   Procedure Laterality Date    APPENDECTOMY N/A     CHOLECYSTECTOMY N/A     COLONOSCOPY N/A     ENDOSCOPY N/A     SHOULDER SURGERY Bilateral     TONSILLECTOMY      TOTAL ABDOMINAL HYSTERECTOMY N/A        Social:  Social History     Socioeconomic History    Marital status:    Tobacco Use    Smoking status: Never    Smokeless tobacco: Never   Substance and Sexual Activity    Alcohol use: Never    Drug use: Never       Family History:  Family History   Problem Relation Age of Onset    Heart attack Mother     Colon cancer Paternal Aunt     Throat cancer  Paternal Aunt     Throat cancer Paternal Uncle     Pancreatic cancer Paternal Grandmother     Stomach cancer Neg Hx     Liver cancer Neg Hx     Liver disease Neg Hx     Esophageal cancer Neg Hx     Crohn's disease Neg Hx     Ulcerative colitis Neg Hx        PHYSICAL:  /78   Pulse 74   Ht 5' (1.524 m)   Wt 64 kg (141 lb 3.3 oz)   BMI 27.58 kg/m²     General Medical Examination:  General: Good hygiene, appropriate appearance.  HEENT: Normocephalic, atraumatic.   Neck: Supple.   Chest: Unlabored breathing.   CV: Symmetric pulses.   Ext: No clubbing, cyanosis, or edema.     Mental Status:  Mood/Affect: Appropriate/congruent.  Level of consciousness: Awake, alert.  Orientation: Oriented to person, place, time and situation.  Language: No Dysarthria    Cranial nerves:  I: Not tested  II: PERRL, VFF to counting  III, IV, VI: EOMI with conjugate gaze and no nystagmus on end gaze  V: Facial sensation intact and symmetric over the bilateral V1-V3  VII: Facial muscle activation intact and symmetric over the bilateral upper and lower face  VIII: Hearing intact in the b/l ears and symmetrical to finger rub  IX, X, XII: TUP midline - no atrophy or fasiculations  X: SCMs and shoulder shrug full strength b/l and symmetric    Motor:   -UE: 5/5 deltoids; 5/5 biceps, triceps; 5/5 wrist flexors, extensors; 5/5 interosseous; 5/5   -LEs: 5/5 hip flexion, extension; 5/5 knee flexion, extension; 5/5 ankle flexion, extension    B/L foot inversions that fluctuate  No tremor noted    DTRs:  ? Biceps Triceps Brachioradialis Knee Ankle   Left 2+   2+    Right 2+   2+        ? Finger taps Finger flicks JESUS ALBERTO Heel taps   Left 0 0 0 0   Right 0 0 0 0     Tone in limbs is mildly hypotonic    Sensation:   -Light touch: Intact and symmetric in the bilateral upper and lower extremities.    Coordination:   -Finger to nose: nl    Gait:  Ponderous astasia abasia gait    Laboratory Data:  NA    EMU admit  EEG FINDINGS:  This record contains  medium amplitude mixed frequencies with   well-formed alpha and beta activity seen during wakefulness with good   variability.  There is abundant beta activity seen in the 15 to 22 Hz range at   times.  This obscures the alpha rhythm at many points during the record.  The   record is symmetrical.  There are no epileptiform discharges seen at any point.    There are no seizures seen at any point.  Qualitatively normal sleep was   captured.  None of the patient's typical push-button events were visualized or   captured during this study.  There were no events noted of any kind.  There were   no electrographic seizures.     INTERPRETATION:  Normal awake and asleep EMU EEG.  No events were captured.       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 after EMU visit shows no electrographic sz  Suggested address PTSD with a psychiatrist            Other    Gait disturbance    Current Assessment & Plan     Report of progressive issues walking since youth with strong family history  On exam, no signs of PDism or cerebellar findings  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  MRI brain show no atrophy to highly suggests SCA  Concern for episodic ataxia or dystonia syndrome - suggested genetic testing    Her gait today is typical of a functional movement disorder - see seizure like activity  Suggest PT          Other Visit Diagnoses       PTSD (post-traumatic stress disorder)    -  Primary    Relevant Orders     Ambulatory referral/consult to Psychiatry    Ataxia        Relevant Orders    Movement Disorder, Autoimmune Eval, Serum    ALEKSANDAR    TSH (Completed)              Angelique Palacio MD, MS [facility]  Department of Neurology  Movement Disorders

## 2023-02-10 NOTE — ASSESSMENT & PLAN NOTE
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

## 2023-02-13 DIAGNOSIS — G24.9 DYSTONIA: Primary | ICD-10-CM

## 2023-02-13 LAB — ANA SER QL IF: NORMAL

## 2023-02-15 ENCOUNTER — PATIENT MESSAGE (OUTPATIENT)
Dept: NEUROLOGY | Facility: CLINIC | Age: 53
End: 2023-02-15
Payer: COMMERCIAL

## 2023-02-15 NOTE — PROGRESS NOTES
"[facility] Hospital Medicine  Progress Note    Patient Name: Vivian Peterson  MRN: [id number]  Patient Class: IP- Inpatient   Admission Date: 10/27/2018  Length of Stay: 6 days  Attending Physician: Rubi Aguila MD  Primary Care Provider: Provider [facility] Medicine Team: [facility] [place] Rubi Aguila MD    Subjective:     Principal Problem:Altered mental status    HPI:  Patient is a 48 year old lady with a h/o migraines, prediabetes on Metformin, GERD, asthma, HLD, hypothyroidism secondary to Hashimoto's, and MAURO.  She was transferred from [facility] where she presented with L sided weakness, numbness, and "staring spells."   at bedside assists with HPI.  Patient began having episodes of L sided weakness, gait disturbance (L>R), tremors, "catatonic episodes," and syncope in February.  Patient was living in [place] at that time and had been taken off her thyroid medications by her PCP.  She was admitted to the hospital and was told she had thyroid storm.  On discharge she was restarted on her medication.  However, patient continued to have episodes.  She was seen by neurology, but her neurologist moved and she was lost to follow-up.  She has had four episodes since that time.  Her most recent episode was on September 22, when she presented to [facility] with syncope and gait disturbance.  Her work-up, including MRI brain, CT head, and LP, was unremarkable.  She was discharged on a higher dose of Topamax (100mg qAM and 150mg qHS).  This evening she presented to OSH with abnormal gait and episodes of "staring off" when she is not able to communicate.  Episodes last about ten minutes.  She had one event while in the ER prior to transfer.  On exam, patient is oriented to situation, place, and time.  When asked her full name, she is unable to answer; however she was able to answer when asked what her first name was and what her last " "name was separately.  She denies chest pain, complains of chronic SOB secondary to asthma.  Denies dizziness, palpitations, fever/chills, N/V/D, abdominal pain.        Hospital Course:  10/27 Consulted neurology, ordered EEG. Requesting MRI of head and Cand T spine - will need to clarify with or without contrast  10/27 Brain/Cervical/Thoracic spine MRI (10/27): "Right frontal lobe developmental venous anomaly." Anomaly does not explain symptoms. Cervical/thoracic spine unremarkable  10/29 rapid response called for seizures / reported as starring spells with jerking movements of head and neck lasting > 30 mins and hence admitted to [facility]. EEG monitoring in place   10/30 No events overnight. Stable for transfer to Hospital Medicine         Interval History: Continuing workup. EEG completed. Additional episode/staring into space overnight, self-resolved. Nosebleed due to dry air in the room, otherwise no complaints.     Review of Systems   Constitutional: Negative for chills.   HENT: Positive for nosebleeds, trouble swallowing and voice change. Negative for congestion, rhinorrhea, sinus pressure, sinus pain, sneezing and sore throat.    Eyes: Negative for discharge and itching.   Respiratory: Negative for cough, choking and shortness of breath.    Cardiovascular: Negative for chest pain, palpitations and leg swelling.   Gastrointestinal: Negative for constipation, diarrhea and nausea.   Neurological: Negative for syncope, facial asymmetry, speech difficulty, light-headedness and headaches.     Objective:     Vital Signs (Most Recent):  Temp: 98 °F (36.7 °C) (11/02/18 0440)  Pulse: 93 (11/02/18 0924)  Resp: 17 (11/02/18 0924)  BP: 118/86 (11/02/18 0924)  SpO2: 96 % (11/02/18 0924) Vital Signs (24h Range):  Temp:  [96.8 °F (36 °C)-99.1 °F (37.3 °C)] 98 °F (36.7 °C)  Pulse:  [] 93  Resp:  [12-18] 17  SpO2:  [92 %-97 %] 96 %  BP: ()/(50-86) 118/86     Weight: 67 kg (147 lb 11.3 oz)  Body mass index is 28.85 " kg/m².  No intake or output data in the 24 hours ending 11/02/18 1153   Physical Exam   Constitutional: She is oriented to person, place, and time. She appears well-developed and well-nourished. No distress.   HENT:   Head: Normocephalic.   Mouth/Throat: Oropharynx is clear and moist. No oropharyngeal exudate.   Neck: No JVD present. No thyromegaly present.   Cardiovascular: Normal rate, regular rhythm and normal heart sounds.   Pulmonary/Chest: Effort normal and breath sounds normal.   Abdominal: Soft. Bowel sounds are normal.   Musculoskeletal: Normal range of motion. She exhibits no edema.   Neurological: She is alert and oriented to person, place, and time.   Skin: Skin is warm and dry. No rash noted.       Significant Labs: CBC: No results for input(s): WBC, HGB, HCT, PLT in the last 48 hours.  CMP: No results for input(s): NA, K, CL, CO2, GLU, BUN, CREATININE, CALCIUM, PROT, ALBUMIN, BILITOT, ALKPHOS, AST, ALT, ANIONGAP, EGFRNONAA in the last 48 hours.    Invalid input(s): ESTGFAFRICA  All pertinent labs within the past 24 hours have been reviewed.    Significant Imaging: I have reviewed all pertinent imaging results/findings within the past 24 hours.    Assessment/Plan:      * Altered mental status    Self-resolving, 10-20 minute episodes of transient loss of consciousness. Suspect neurally mediated syncope, additionally considering cardiac syncope versus non-epileptiform spells. Personal history of palpitations and arrhythmia necessitating beta blocker use, no longer taking beta blocker after running out of Rx. Personal history of orthostatic dizziness and syncope.  - Orthostatic vitals 11/1 positive  - encouraged patient to increase fluid intake, sodium  - EEG negative during episode on 11/1/18, low concern for seizures  - Imaging thus far: CT head, MRI brain, MRI C-spine, MRI L-spine shows small venous malformation that is reportedly unlikely to be related to her symptoms, otherwise normal  - UA, Utox, LP  negative  - TSH and FT4 normal  - B1, B3, B6, B12, folate normal  - RPR negative  - Echocardiogram normal  - ECG with nonspecific t wave changes  - Telemetry  - Carotid US pending  - will need outpatient tilt table testing  - HOLD amitriptyline     Migraines    Chronic, stable  - holding topiramate, patient would like to defer restart to outpatient  - hold amitriptyline     Asthma    Chronic  - Continue montelukast     Prediabetes    Chronic, HbA1c 5.5%  - accuchecks and low dose aspart correction QACHS     MAURO (obstructive sleep apnea)    - CPAP qHS.       GERD (gastroesophageal reflux disease)    - continue pantoprazole       HLD (hyperlipidemia)    Chronic  - continue home statin       Hypothyroidism due to Hashimoto's thyroiditis    TSH and FT4 normal. Has history of thyroid nodule  - Continue levothyroxine 50mcg  - US thyroid       VTE Risk Mitigation (From admission, onward)        Ordered     enoxaparin injection 40 mg  Daily      10/28/18 1512     Place sequential compression device  Until discontinued      10/27/18 0515     Place HERBIE hose  Until discontinued      10/27/18 0515     IP VTE HIGH RISK PATIENT  Once      10/27/18 0515            Rubi Aguila M.D., M.P.H.  Department of Hospital Medicine  [facility]  (pager) [phone]  (spect) [phone]   Complex Repair Preamble Text (Leave Blank If You Do Not Want): Extensive wide undermining was performed.

## 2023-02-27 ENCOUNTER — PATIENT MESSAGE (OUTPATIENT)
Dept: NEUROLOGY | Facility: CLINIC | Age: 53
End: 2023-02-27
Payer: COMMERCIAL

## 2023-02-27 LAB
AMPHIPHYSIN AB TITR SER: NEGATIVE {TITER}
CASPR2-IGG CBA: NEGATIVE
CV2 IGG SER QL IB: NEGATIVE
DPPX IGG SERPL QL IF: NEGATIVE
GAD65 AB SER-SCNC: 0 NMOL/L
GLIAL NUC TYPE 1 AB TITR SER: NEGATIVE {TITER}
GRAF1 IFA,S: NEGATIVE
HU1 AB TITR SER: NEGATIVE {TITER}
HU2 AB TITR SER IF: NEGATIVE {TITER}
HU3 AB TITR SER: NEGATIVE {TITER}
IGLON5 IFA, S: NEGATIVE
IMMUNOLOGIST REVIEW: NORMAL
ITPR1 IFA, S: NEGATIVE
KELCH-LIKE PROTEIN ANTIBODY, SERUM: NEGATIVE
LGI1-IGG CBA: NEGATIVE
MGLUR1 AB IFA, SERUM: NEGATIVE
NIF IFA, S: NEGATIVE
NMDA-R AB CBA, SERUM: NEGATIVE
PCA-1 AB TITR SER: NEGATIVE {TITER}
PCA-2 AB TITR SER: NEGATIVE {TITER}
PCA-TR AB TITR SER: NEGATIVE {TITER}
VGCC-P/Q BIND AB SER-SCNC: 0 NMOL/L

## 2023-02-28 ENCOUNTER — OUTSIDE PLACE OF SERVICE (OUTPATIENT)
Dept: GASTROENTEROLOGY | Facility: CLINIC | Age: 53
End: 2023-02-28

## 2023-02-28 LAB — CRC RECOMMENDATION EXT: NORMAL

## 2023-02-28 PROCEDURE — 45385 COLONOSCOPY W/LESION REMOVAL: CPT | Mod: 33,,, | Performed by: INTERNAL MEDICINE

## 2023-02-28 PROCEDURE — 45385 PR COLONOSCOPY,REMV LESN,SNARE: ICD-10-PCS | Mod: 33,,, | Performed by: INTERNAL MEDICINE

## 2023-03-01 ENCOUNTER — PATIENT MESSAGE (OUTPATIENT)
Dept: NEUROLOGY | Facility: CLINIC | Age: 53
End: 2023-03-01
Payer: COMMERCIAL

## 2023-03-01 DIAGNOSIS — R27.0 ATAXIA: Primary | ICD-10-CM

## 2023-03-02 ENCOUNTER — PATIENT MESSAGE (OUTPATIENT)
Dept: GASTROENTEROLOGY | Facility: CLINIC | Age: 53
End: 2023-03-02
Payer: COMMERCIAL

## 2023-03-02 NOTE — TELEPHONE ENCOUNTER
Talked with patient and conveyed message that bleeding is probably from site where colon polyp was removed. Instructed patient that bleeding should resolve in 2 to 3 days, and to avoid aspirin or motrin for 10 days. Also told her to go to ER if she experiences large amount rectal bleeding. She voiced understanding.

## 2023-03-02 NOTE — TELEPHONE ENCOUNTER
----- Message from Calista Block sent at 3/2/2023 10:02 AM CST -----  Contact: self  Type:  Patient Returning Call    Who Called: Vivian Peterson   Who Left Message for Patient: Aure  Does the patient know what this is regarding?: Pt requested a call back in regards to blood clots following colonoscopy  Would the patient rather a call back or a response via [facility]? Call back  Best Call Back Number: [phone]   Additional Information: n/a

## 2023-03-02 NOTE — TELEPHONE ENCOUNTER
Returned pt call. Pt is reporting passing some brownish/red blood from rectum this morning when urinating and would like to know if this is normal or should she be seen. States she is overall feeling ok. Informed pt I would send message to RMM/MLC and call her back with a plan. - VL

## 2023-03-02 NOTE — TELEPHONE ENCOUNTER
----- Message from Marylu Bazzi sent at 3/2/2023  8:08 AM CST -----  Regarding: Problems and Concerns  Contact: patient  Per phone call with patient, she stated that she took a colonoscopy on 02/28/2023 and this morning she started having blood clotts and she wanted to know if this is normal or not.  If she needs to come in please return call at [phone] (home).    Thanks,  SJ  .

## 2023-03-06 ENCOUNTER — TELEPHONE (OUTPATIENT)
Dept: NEUROLOGY | Facility: CLINIC | Age: 53
End: 2023-03-06
Payer: COMMERCIAL

## 2023-03-06 RX ORDER — OMEPRAZOLE 40 MG/1
40 CAPSULE, DELAYED RELEASE ORAL DAILY
Qty: 30 CAPSULE | Refills: 11 | Status: SHIPPED | OUTPATIENT
Start: 2023-03-06 | End: 2023-05-11 | Stop reason: SDUPTHER

## 2023-03-06 RX ORDER — OMEPRAZOLE 20 MG/1
20 CAPSULE, DELAYED RELEASE ORAL NIGHTLY
Qty: 30 CAPSULE | Refills: 11 | Status: SHIPPED | OUTPATIENT
Start: 2023-03-06 | End: 2023-05-11 | Stop reason: SDUPTHER

## 2023-03-06 NOTE — TELEPHONE ENCOUNTER
----- Message from Priscilla Price sent at 3/6/2023 10:54 AM CST -----  Regarding: referral  update  Contact: cassie [phone]  Cassie with Pt calling a  to get a update on referral in the system on this pt . Please call to discuss further.fax number [phone]

## 2023-03-08 ENCOUNTER — TELEPHONE (OUTPATIENT)
Dept: NEUROLOGY | Facility: CLINIC | Age: 53
End: 2023-03-08
Payer: COMMERCIAL

## 2023-03-08 NOTE — TELEPHONE ENCOUNTER
----- Message from Nish Quintero sent at 3/8/2023 10:33 AM CST -----  Regarding: reff  Contact: @[phone](cassie)   fax# [phone]  Therapy calling in regards to getting right ref sent over states the ref says migranes but its supposed to be for ataxia balance they have been calling for a week and are still waiting pls call and adv@[phone](cassie)   fax# [phone]

## 2023-03-08 NOTE — TELEPHONE ENCOUNTER
Physical therapy referral faxed via [employer] [facility] [place] at [phone]. Called Pam at [phone] to update on faxed status.

## 2023-03-08 NOTE — TELEPHONE ENCOUNTER
----- Message from Alexia Haley RN sent at 3/8/2023 10:48 AM CST -----  Regarding: FW: reff  Contact: @[phone](cassie)   fax# [phone]  Looks like there was an order placed by Dr. Palacio already and looks to be ataxia, Can you guys fax to them please and call and give them an update?    Thanks  ----- Message -----  From: Nish Quintero  Sent: 3/8/2023  10:38 AM CST  To: Alexia Haley RN, Pia Merino Staff  Subject: reff                                             Therapy calling in regards to getting right ref sent over states the ref says migranes but its supposed to be for ataxia balance they have been calling for a week and are still waiting pls call and adv@[phone](cassie)   fax# [phone]

## 2023-03-14 ENCOUNTER — PATIENT MESSAGE (OUTPATIENT)
Dept: NEUROLOGY | Facility: CLINIC | Age: 53
End: 2023-03-14
Payer: COMMERCIAL

## 2023-03-17 ENCOUNTER — TELEPHONE (OUTPATIENT)
Dept: NEUROLOGY | Facility: CLINIC | Age: 53
End: 2023-03-17
Payer: COMMERCIAL

## 2023-03-17 ENCOUNTER — PATIENT MESSAGE (OUTPATIENT)
Dept: GASTROENTEROLOGY | Facility: CLINIC | Age: 53
End: 2023-03-17
Payer: COMMERCIAL

## 2023-03-17 ENCOUNTER — PATIENT MESSAGE (OUTPATIENT)
Dept: NEUROLOGY | Facility: CLINIC | Age: 53
End: 2023-03-17
Payer: COMMERCIAL

## 2023-03-17 NOTE — TELEPHONE ENCOUNTER
Left message for Barb at lab. With our fax.  Lab [phone]. They will fax [employer] paperwork to us for Dr. Palacio to sign.

## 2023-03-17 NOTE — TELEPHONE ENCOUNTER
----- Message from Miriam Bill sent at 3/17/2023 10:25 AM CDT -----  Contact: Barb @ [phone]  Barb with the Pathology lab in [place] is requesting a call back regarding questions about the pt labs

## 2023-03-24 ENCOUNTER — PATIENT MESSAGE (OUTPATIENT)
Dept: NEUROLOGY | Facility: CLINIC | Age: 53
End: 2023-03-24
Payer: COMMERCIAL

## 2023-03-24 DIAGNOSIS — Z91.89 AT RISK FOR SLEEP APNEA: Primary | ICD-10-CM

## 2023-04-14 ENCOUNTER — DOCUMENTATION ONLY (OUTPATIENT)
Dept: GASTROENTEROLOGY | Facility: CLINIC | Age: 53
End: 2023-04-14
Payer: COMMERCIAL

## 2023-04-26 ENCOUNTER — TELEPHONE (OUTPATIENT)
Dept: PULMONOLOGY | Facility: CLINIC | Age: 53
End: 2023-04-26
Payer: COMMERCIAL

## 2023-04-26 NOTE — TELEPHONE ENCOUNTER
Spoke with patient her referral was not linked to us it was in general have her scheduled but was not able to link it. LB  ----- Message from Justian Kasper sent at 4/26/2023  3:04 PM CDT -----  Contact: SELF  Type - New Patient Appointment     Patient was referred to get established w/provider. Please review and reach out to patient @ [phone] - thanks!

## 2023-04-27 ENCOUNTER — TELEPHONE (OUTPATIENT)
Dept: PULMONOLOGY | Facility: CLINIC | Age: 53
End: 2023-04-27
Payer: COMMERCIAL

## 2023-04-27 ENCOUNTER — OFFICE VISIT (OUTPATIENT)
Dept: NEUROLOGY | Facility: CLINIC | Age: 53
End: 2023-04-27
Payer: COMMERCIAL

## 2023-04-27 ENCOUNTER — PATIENT MESSAGE (OUTPATIENT)
Dept: NEUROLOGY | Facility: CLINIC | Age: 53
End: 2023-04-27

## 2023-04-27 DIAGNOSIS — G43.909 MIGRAINE WITHOUT STATUS MIGRAINOSUS, NOT INTRACTABLE, UNSPECIFIED MIGRAINE TYPE: Primary | Chronic | ICD-10-CM

## 2023-04-27 PROCEDURE — 99214 PR OFFICE/OUTPT VISIT, EST, LEVL IV, 30-39 MIN: ICD-10-PCS | Mod: 95,,, | Performed by: PSYCHIATRY & NEUROLOGY

## 2023-04-27 PROCEDURE — 99214 OFFICE O/P EST MOD 30 MIN: CPT | Mod: 95,,, | Performed by: PSYCHIATRY & NEUROLOGY

## 2023-04-27 RX ORDER — RIMEGEPANT SULFATE 75 MG/75MG
75 TABLET, ORALLY DISINTEGRATING ORAL ONCE AS NEEDED
Qty: 15 TABLET | Refills: 2 | Status: SHIPPED | OUTPATIENT
Start: 2023-04-27 | End: 2023-04-27

## 2023-04-27 NOTE — TELEPHONE ENCOUNTER
Called and spoke with patient she will be having MD fax over  pulmonary clearance to be scheduled. LB  ----- Message from Carmela Banda sent at 4/27/2023  9:08 AM CDT -----  Contact: Vivian  Vivian needs a call back  at [phone], Regards to her surgery.    Thanks  Td

## 2023-04-27 NOTE — PROGRESS NOTES
Neurology Telemedicine Note     Name: Vivian Peterson  MRN: [id number]   CSN: [id number]      Date: 04/27/2023    The patient location is: Home  The chief complaint leading to consultation is:  Headache  Visit type: Virtual visit with synchronous audio and video    TOTAL TIME SPENT:  35 minutes    Each patient to whom I provide medical services by telemedicine is:  (1) informed of the relationship between the physician and patient and the respective role of any other health care provider with respect to management of the patient; and (2) notified that they may decline to receive medical services by telemedicine and may withdraw from such care at any time.    History of Present Illness (HPI):  Patient is a 53-year-old female with past medical history of migraines since childhood who presents due to treatment of chronic migraines.  Patient states that she has seen neurologists and has been prescribed multiple preventatives migraine medications but has never gotten the prior authorization for them.  Patient states she is tried multiple preventative and abortive medications in the past which either have not helped or she could not tolerate due to side effects.  Patient states that approximately 3 to 4 times a week she has migraines that are sharp can be unilateral associated with light and sound sensitivity along with nausea and vomiting.  Migraines run in her family as her mother and her daughter also have migraines.  She also explains that she has these cluster of sharp stabbing unilateral headaches that have been occurring since 2018.  She had an MRI in 2018 which was negative.  Her migraines have worsened in 2018.  Patient has no towards the low blood pressure in his on midodrine.    Patient has tried amitriptyline, Topamax, gabapentin, sumatriptan, rizatriptan, zolmitriptan, Excedrin migraine, Tylenol, ibuprofen.  She is tried these medications for longer than 6 months and either could not tolerate due to side  effects or the medications were not effective.    Of note patient has a history of gastric ulcers that were bleeding and is limited as far as what she can take over-the-counter.  Patient also states that for the past couple of years she is been taking either Tylenol or Excedrin on a near daily basis and endorses continuous headache in the background which is likely due to chronic daily headache syndrome secondary to medication overuse    Nonmotor/Premotor ROS: as per HPI, and all other systems are negative    Past Medical History: The patient  has a past medical history of Asthma, BMI 27.0-27.9,adult, Chronic constipation, Colon polyp, GERD (gastroesophageal reflux disease), HLD (hyperlipidemia), Hypothyroidism due to Hashimoto's thyroiditis, Migraines, MAURO (obstructive sleep apnea), Prediabetes, Seizures, and Unspecified hemorrhoids.    Social History: The patient  reports that she has never smoked. She has never used smokeless tobacco. She reports that she does not drink alcohol and does not use drugs.    Family History: Their family history includes Colon cancer in her paternal aunt; Heart attack in her mother; Pancreatic cancer in her paternal grandmother; Throat cancer in her paternal aunt and paternal uncle.    Allergies: Penicillins, Sulfa (sulfonamide antibiotics), and Clarithromycin     Meds:   Current Outpatient Medications on File Prior to Visit   Medication Sig Dispense Refill    albuterol (PROVENTIL/VENTOLIN HFA) 90 mcg/actuation inhaler Inhale 2 puffs into the lungs every 6 (six) hours as needed for Wheezing. Rescue      aspirin (ECOTRIN) 81 MG EC tablet Take 81 mg by mouth once daily.      atorvastatin (LIPITOR) 20 MG tablet Take 20 mg by mouth every evening.      fluticasone-salmeterol 250-50 mcg/dose (ADVAIR) 250-50 mcg/dose diskus inhaler Inhale 1 puff into the lungs 2 (two) times daily. Controller      levothyroxine (SYNTHROID) 50 MCG tablet Take 50 mcg by mouth before breakfast.      linaCLOtide  (LINZESS) 145 mcg Cap capsule Take 1 capsule (145 mcg total) by mouth before breakfast. 90 capsule 3    midodrine (PROAMATINE) 5 MG Tab Take 5 mg by mouth 2 (two) times daily with meals.      omeprazole (PRILOSEC) 20 MG capsule Take 1 capsule (20 mg total) by mouth every evening. 30 capsule 11    omeprazole (PRILOSEC) 40 MG capsule Take 1 capsule (40 mg total) by mouth once daily. 30 capsule 11     No current facility-administered medications on file prior to visit.       Exam:  Vital Signs deferred with home visit    Constitutional  Well-developed, well-nourished, appears stated age   Eyes  No scleral icterus   ENT  Moist oral mucosa   Cardiovascular  No lower extremity edema    Respiratory  No labored breathing    Skin  No rashes   Hematologic  No bruising   Psychiatric  Normal mood and affect   Other  GI/ deferred    Neurological     * Mental status  Alert and oriented to person, place, time, and situation; no dysarthria; no aphasia; normal recent and remote memory; follows commands   * Cranial nerves     - CN II  Pupils midposition and symmetric   - CN III, IV, VI  Extraocular movements full, no nystagmus visualized   - CN V  Unable to test   - CN VII  Face strong and symmetric bilaterally    - CN VIII  Hearing grossly intact with conversation    - CN IX, X  Palate raises midline and symmetric    - CN XI  Strong shoulder shrug B    - CN XII  Tongue appears midline    * Motor  Normal bulk by appearance, no drift    * Sensory  Not tested objectively, no changes described by the patient   * Deep tendon reflexes  Not tested   * Coord/Movemt/Gait No hypophonic speech.  No facial masking.  No B bradykinesia.  No tremor with rest, posture, kinesis, or intention.   No chorea, athetosis, dystonia, myoclonus, or tics.   No motor impersistence.  Gait is deferred for safety.       Medical Record Review:  - Lab Results:  Documentation Only on 04/14/2023   Component Date Value Ref Range Status    CRC Recommendation External  2023 Repeat colonoscopy in 5 years   Final   Lab Visit on 02/10/2023   Component Date Value Ref Range Status    PAVAL,  Amphiphysin Ab, Serum 02/10/2023 Negative  Negative Final    PAVAL AGNA-1, Serum 02/10/2023 Negative  Negative Final    PAVAL IRWIN-1, Serum 02/10/2023 Negative  Negative Final    PAVAL IRWIN-2, Serum 02/10/2023 Negative  Negative Final    PAVAL IRWIN-3, Serum 02/10/2023 Negative  Negative Final    CASPR2-IgG CBA 02/10/2023 Negative  Negative Final    Collapsin Response- Protei* 02/10/2023 Negative  Negative Final    DPPX Ab, IFA 02/10/2023 Negative  Negative Final    Glutamic Acid Decarb Ab 02/10/2023 0.00  <=0.02 nmol/L Final    LGI1-IgG CBA 02/10/2023 Negative  Negative Final    mGluR1 Ab, IFA, Serum 02/10/2023 Negative  Negative Final    NMDA-R Ab CBA, Serum 02/10/2023 Negative  Negative Final    P/Q Type Calcium Channel Ab 02/10/2023 0.00  <=0.02 nmol/L Final    PAVAL, PCA-1, Serum 02/10/2023 Negative  Negative Final    PAVAL, PCA-2, Serum 02/10/2023 Negative  Negative Final    PAVAL, PCA-Tr, Serum 02/10/2023 Negative  Negative Final    GRAF1 IFA,S 02/10/2023 Negative  Negative Final    IgLON5 IFA, S 02/10/2023 Negative  Negative Final    ITPR1 IFA, S 02/10/2023 Negative  Negative Final    NIF IFA, S 02/10/2023 Negative  Negative Final    Movement Disorder Interpretation 02/10/2023 SEE BELOW   Final    KLHK 11 Antibody CBA 02/10/2023 Negative  Negative Final    ALEKSANDAR Screen 02/10/2023 Negative <1:80  Negative <1:80 Final    TSH 02/10/2023 1.675  0.400 - 4.000 uIU/mL Final       Diagnoses:   1) Chronic migraines  2) Medication overuse headaches    Medical Decision Makin) Stop daily tylenol and excedrin  2) Nurtec 75 mg q24h prn for headaches  3) Emgality 240 mg loading dose x 1 then 120 mg q4wk  4) headache diary  5) b2 and mag ox 400 mg shauna      I spent 35 minutes with the patient with >50% of the time spent with counseling and education regarding:    This is a consult  performed through audio-visual using [employer] liz. Pt and provider are in different locations. History and physical exam are limited due to the nature of this encounter.       Saulo Meza MD

## 2023-05-05 ENCOUNTER — TELEPHONE (OUTPATIENT)
Dept: NEUROLOGY | Facility: CLINIC | Age: 53
End: 2023-05-05
Payer: COMMERCIAL

## 2023-05-05 NOTE — TELEPHONE ENCOUNTER
Called pt to reschedule 05/12/23 appt on 3:40 vv because Dr. Palacio will not be in clinic on this date. Offered 05/19/12 at same time 3:40 vv. No answer. Left vm.

## 2023-05-08 NOTE — TELEPHONE ENCOUNTER
Vivian Peterson ([place]: [id number]) Arturo Peterson ([place]: [id number]) [facility]    PA's submitted through [facility] pending insurance response.

## 2023-05-10 ENCOUNTER — TELEPHONE (OUTPATIENT)
Dept: NEUROLOGY | Facility: CLINIC | Age: 53
End: 2023-05-10
Payer: COMMERCIAL

## 2023-05-10 NOTE — TELEPHONE ENCOUNTER
Called pt back to reschedule appt on Friday 05/12/23 at 3:40 pm. Pt did not have her calendar with her, so she would like to call back later to reschedule.

## 2023-05-10 NOTE — TELEPHONE ENCOUNTER
----- Message from Pam Lemon sent at 5/10/2023 10:28 AM CDT -----  Regarding: ADVISE  Contact: self  Pt ask for a call have some questions.      Contact info  [phone] (azbd)       
Returned pt's call to reschedule their vv with Dr. Palacio for this coming Friday at 3:40 PM due to Dr. Palacio not being in clinic on this date.  
Initial (On Arrival)

## 2023-05-11 ENCOUNTER — PATIENT MESSAGE (OUTPATIENT)
Dept: NEUROLOGY | Facility: CLINIC | Age: 53
End: 2023-05-11
Payer: COMMERCIAL

## 2023-05-11 ENCOUNTER — PATIENT MESSAGE (OUTPATIENT)
Dept: GASTROENTEROLOGY | Facility: CLINIC | Age: 53
End: 2023-05-11
Payer: COMMERCIAL

## 2023-05-11 ENCOUNTER — TELEPHONE (OUTPATIENT)
Dept: NEUROLOGY | Facility: CLINIC | Age: 53
End: 2023-05-11
Payer: COMMERCIAL

## 2023-05-11 NOTE — TELEPHONE ENCOUNTER
Called pt once more to reschedule vv for tomorrow due to Dr. Palacio being at a conference that day. We rescheduled for 05/29/23 at 11:20 AM

## 2023-05-15 ENCOUNTER — CLINICAL SUPPORT (OUTPATIENT)
Dept: PULMONOLOGY | Facility: CLINIC | Age: 53
End: 2023-05-15
Payer: COMMERCIAL

## 2023-05-15 ENCOUNTER — OFFICE VISIT (OUTPATIENT)
Dept: PULMONOLOGY | Facility: CLINIC | Age: 53
End: 2023-05-15
Payer: COMMERCIAL

## 2023-05-15 VITALS
OXYGEN SATURATION: 96 % | HEIGHT: 60 IN | HEART RATE: 87 BPM | RESPIRATION RATE: 16 BRPM | WEIGHT: 140 LBS | SYSTOLIC BLOOD PRESSURE: 109 MMHG | BODY MASS INDEX: 27.48 KG/M2 | DIASTOLIC BLOOD PRESSURE: 72 MMHG

## 2023-05-15 DIAGNOSIS — J45.909 ASTHMA: Chronic | ICD-10-CM

## 2023-05-15 DIAGNOSIS — R06.83 LOUD SNORING: ICD-10-CM

## 2023-05-15 DIAGNOSIS — G47.33 OSA (OBSTRUCTIVE SLEEP APNEA): Primary | ICD-10-CM

## 2023-05-15 DIAGNOSIS — R06.81 WITNESSED EPISODE OF APNEA: ICD-10-CM

## 2023-05-15 DIAGNOSIS — Z01.811 ENCOUNTER FOR PREOPERATIVE PULMONARY EXAMINATION: ICD-10-CM

## 2023-05-15 DIAGNOSIS — J45.20 MILD INTERMITTENT ASTHMA WITHOUT COMPLICATION: ICD-10-CM

## 2023-05-15 DIAGNOSIS — J45.909 ASTHMA: Primary | Chronic | ICD-10-CM

## 2023-05-15 DIAGNOSIS — G47.19 EXCESSIVE DAYTIME SLEEPINESS: ICD-10-CM

## 2023-05-15 PROCEDURE — 94060 PR EVAL OF BRONCHOSPASM: ICD-10-PCS | Mod: S$GLB,,, | Performed by: INTERNAL MEDICINE

## 2023-05-15 PROCEDURE — 99205 PR OFFICE/OUTPT VISIT, NEW, LEVL V, 60-74 MIN: ICD-10-PCS | Mod: 25,S$GLB,, | Performed by: INTERNAL MEDICINE

## 2023-05-15 PROCEDURE — 94726 PLETHYSMOGRAPHY LUNG VOLUMES: CPT | Mod: S$GLB,,, | Performed by: INTERNAL MEDICINE

## 2023-05-15 PROCEDURE — 94060 EVALUATION OF WHEEZING: CPT | Mod: S$GLB,,, | Performed by: INTERNAL MEDICINE

## 2023-05-15 PROCEDURE — 94726 PULM FUNCT TST PLETHYSMOGRAP: ICD-10-PCS | Mod: S$GLB,,, | Performed by: INTERNAL MEDICINE

## 2023-05-15 PROCEDURE — 94729 PR C02/MEMBANE DIFFUSE CAPACITY: ICD-10-PCS | Mod: S$GLB,,, | Performed by: INTERNAL MEDICINE

## 2023-05-15 PROCEDURE — 99205 OFFICE O/P NEW HI 60 MIN: CPT | Mod: 25,S$GLB,, | Performed by: INTERNAL MEDICINE

## 2023-05-15 PROCEDURE — 94729 DIFFUSING CAPACITY: CPT | Mod: S$GLB,,, | Performed by: INTERNAL MEDICINE

## 2023-05-15 RX ORDER — OMEPRAZOLE 40 MG/1
40 CAPSULE, DELAYED RELEASE ORAL DAILY
Qty: 30 CAPSULE | Refills: 11 | Status: SHIPPED | OUTPATIENT
Start: 2023-05-15 | End: 2023-07-26

## 2023-05-15 RX ORDER — OMEPRAZOLE 20 MG/1
20 CAPSULE, DELAYED RELEASE ORAL NIGHTLY
Qty: 30 CAPSULE | Refills: 11 | Status: SHIPPED | OUTPATIENT
Start: 2023-05-15 | End: 2023-07-05

## 2023-05-15 NOTE — PROGRESS NOTES
Pulmonary Medicine Clinic Note    Patient Identification:   Patient ID: Vivian Peterson is a 53 y.o. female.    Referring Provider:  Dr. Palacio    Chief Complaint:  Sleep Apnea and pulm clearance      History of Present Illness:    Vivian Peterson is a 53 y.o. female who presents for evaluation and management of sleep disorder breathing referred to us by Neurology as well as for preoperative clearance for a carpal tunnel release surgery by Dr. Ron.  Patient has underlying diagnosis of asthma since childhood.  Currently seeing ENT and getting allergy shots with significant improvement her symptoms.  Using albuterol sparingly.  Needing a spacer.  She is been referred to us for preop clearance for an upcoming carpal tunnel release surgery.  She has no shortness of breath, cough, wheezing or chest pain at this point.    Patient was diagnosed with obstructive sleep apnea few years ago and used to be on CPAP while she was in [place].  When she moved her machine was taken back as this was not paid off.  According to her  she snores loudly and has witnessed apneas.  She wakes up gasping for air.  She has morning dry mouth and headaches and feels groggy and tired upon awakening.  She sees cardiology for abnormal EKG and has a stress test which was normal.  She seldom uses caffeinated beverages and mostly tries to drink water.  He does take naps which could be up to 3 hours in duration and feels better after those.  Her tonsils were taken out when she was 21 years old.  She has Mallampati class 2 with a BMI of 27.3 kilos per m2.  Her [place] Sleepiness scale score is elevated at 17.  Her neck circumference is 13-1/4 inch.  Stop Bang score is significantly elevated.  She has trouble sleeping and thinks she sleeps anywhere from 3-4 hours.  She has used amitriptyline in the past and does take occasional trazodone 150 mg as needed.  She has to be careful about it as this may cause excessive and prolonged sleepiness.  She  has used a fullface mask in the past but had trouble.  She was placed on nasal pillows which she liked better.  She does not smoke or drink.  She works cleaning houses.  She does have orthostatic hypotension and takes midodrine.  She sees neurology for migraines and a movement disorder.  This is not a sleep-related movement disorder.    Review of Systems:  Review of Systems   Constitutional:  Negative for fever, chills, weight loss, weight gain, activity change, appetite change, fatigue, night sweats and weakness.   HENT:  Negative for nosebleeds, postnasal drip, rhinorrhea, sinus pressure, sore throat, trouble swallowing, voice change, congestion, ear pain and hearing loss.    Eyes:  Negative for redness and itching.   Respiratory:  Positive for apnea, snoring and somnolence. Negative for cough, hemoptysis, sputum production, choking, chest tightness, shortness of breath, wheezing, orthopnea, previous hospitialization due to pulmonary problems, asthma nighttime symptoms, pleurisy, dyspnea on extertion, use of rescue inhaler and Paroxysmal Nocturnal Dyspnea.    Cardiovascular:  Negative for chest pain, palpitations and leg swelling.   Genitourinary:  Negative for difficulty urinating and hematuria.   Endocrine:  Negative for polydipsia, polyphagia, cold intolerance, heat intolerance and polyuria.    Musculoskeletal:  Negative for arthralgias, back pain, gait problem, joint swelling and myalgias.   Skin:  Negative for rash.   Gastrointestinal:  Negative for nausea, vomiting, abdominal pain, abdominal distention and acid reflux.   Neurological:  Negative for dizziness, syncope, weakness, light-headedness and headaches.   Hematological:  Negative for adenopathy. Does not bruise/bleed easily and no excessive bruising.   Psychiatric/Behavioral:  Negative for confusion and sleep disturbance. The patient is not nervous/anxious.        Allergies:   Review of patient's allergies indicates:   Allergen Reactions    Penicillins  Anaphylaxis    Sulfa (sulfonamide antibiotics) Anaphylaxis    Clarithromycin Rash       Medications:      Past Medical History:      Past Medical History:   Diagnosis Date    Asthma     BMI 27.0-27.9,adult     Chronic constipation     Colon polyp     GERD (gastroesophageal reflux disease)     HLD (hyperlipidemia)     Hypothyroidism due to Hashimoto's thyroiditis     Migraines     MAURO (obstructive sleep apnea)     Prediabetes     Seizures     due to stress and anxiety    Unspecified hemorrhoids        Family History:      Family History   Problem Relation Age of Onset    Heart attack Mother     Colon cancer Paternal Aunt     Throat cancer Paternal Aunt     Throat cancer Paternal Uncle     Pancreatic cancer Paternal Grandmother     Stomach cancer Neg Hx     Liver cancer Neg Hx     Liver disease Neg Hx     Esophageal cancer Neg Hx     Crohn's disease Neg Hx     Ulcerative colitis Neg Hx         Social History:      Past Surgical History:   Procedure Laterality Date    APPENDECTOMY N/A     CHOLECYSTECTOMY N/A     COLONOSCOPY N/A     ENDOSCOPY N/A     SHOULDER SURGERY Bilateral     TONSILLECTOMY      TOTAL ABDOMINAL HYSTERECTOMY N/A        Physical Exam:  Vitals:    05/15/23 1131   BP: 109/72   Pulse: 87   Resp: 16     Physical Exam   Constitutional: She is oriented to person, place, and time. She appears not cachectic. No distress.   HENT:   Head: Normocephalic.   Right Ear: External ear normal.   Left Ear: External ear normal.   Nose: Nose normal. No mucosal edema. No polyps.   Mouth/Throat: Oropharynx is clear and moist. Normal dentition. No oropharyngeal exudate. Mallampati Score: II.   Neck: No JVD present. No tracheal deviation present. No thyromegaly present.   Cardiovascular: Normal rate, regular rhythm, normal heart sounds and intact distal pulses. Exam reveals no gallop and no friction rub.   No murmur heard.  Pulmonary/Chest: Normal expansion, symmetric chest wall expansion, effort normal and breath sounds  normal. No stridor. No respiratory distress. She has no decreased breath sounds. She has no wheezes. She has no rhonchi. She has no rales. Chest wall is not dull to percussion. She exhibits no tenderness. Negative for egophony. Negative for tactile fremitus.   Abdominal: Soft. Bowel sounds are normal. She exhibits no distension and no mass. There is no hepatosplenomegaly. There is no abdominal tenderness. There is no rebound and no guarding. No hernia.   Musculoskeletal:         General: No tenderness, deformity or edema. Normal range of motion.      Cervical back: Normal range of motion and neck supple.   Lymphadenopathy: No supraclavicular adenopathy is present.     She has no cervical adenopathy.     She has no axillary adenopathy.   Neurological: She is alert and oriented to person, place, and time. She has normal reflexes. She displays normal reflexes. No cranial nerve deficit. She exhibits normal muscle tone.   Skin: Skin is warm and dry. No rash noted. She is not diaphoretic. No cyanosis or erythema. No pallor. Nails show no clubbing.   Psychiatric: She has a normal mood and affect. Her behavior is normal. Judgment and thought content normal.       Accessory Clinical Data:  Previous Sleep Study findings:  None available    ESS = 17    PFTs are normal    STOPBANG = 4    Do you snore loudly? Y  Do you feel tired, fatigued or sleepy during the day? Y  Has anyone observed that you stop breathing in your sleep? Y  Do you have or been treated for high blood pressure? n  BMI: N  Age > 50? Y  Neck circumference > 40 cm: N  Gender:  N    Assessment and Plan:    Problem List Items Addressed This Visit          Pulmonary    Asthma (Chronic)       Other    MAURO (obstructive sleep apnea) - Primary    Relevant Orders    Polysomnogram (CPAP will be added if patient meets diagnostic criteria.)     Other Visit Diagnoses       Excessive daytime sleepiness        Loud snoring        Witnessed episode of apnea        Encounter  for preoperative pulmonary examination               Patient has previously known but currently untreated obstructive sleep apnea.  She needs a repeat diagnostic and titration study.  I am going to order a split night study.  She is very symptomatic with an ESS of 17 with chronic migraines.  I have discussed with her the pathophysiology of MAURO and the harmful cardiovascular and neurocognitive side effects,   morbidity and mortality associated with untreated sleep disordered breathing.  We discussed the available diagnostic and treatment modalities, their merits and demerits in detail.  All the questions were answered. She is agreeable to the above plan.     From asthma standpoint she is currently stable and there is no need for escalation of therapy.  We will give her a spacer to use her albuterol inhaler.  She should continue to avoid triggers and allergy shots.    From pulmonary standpoint there is no absolute or relative contraindication to proceed with carpal tunnel surgery.  Her PFTs are normal and chest is clear to auscultation.  If general anesthesia is given, recommend to keep the duration of anesthesia as little as possible and use postoperative CPAP.  Also recommend to watch for hypoxia postoperatively with judicial use of narcotics given untreated sleep apnea.    More than 50% of 60 min time was spent face-to-face on counseling, in reviewing the imaging studies, reviewing notes from primary care provider, performing appropriate examination, counseling and educating the patient regarding the findings on CT scan, ordering medications and appropriate follow-up imaging studies, documenting clinical information in the electronic medical record, care coordination as well as communicating with the primary referring physician.      Follow up for after sleep study.  Thank you very much for involving me in the care of this patient.  Please do not hesitate to reach me if you have any further questions or concerns.

## 2023-05-17 ENCOUNTER — TELEPHONE (OUTPATIENT)
Dept: NEUROLOGY | Facility: CLINIC | Age: 53
End: 2023-05-17
Payer: COMMERCIAL

## 2023-05-17 NOTE — TELEPHONE ENCOUNTER
----- Message from Teri Silva sent at 5/15/2023  3:36 PM CDT -----  Regarding: Plan Of Care Status  Contact: [phone]  María with [facility] is calling to check the status of a plan of care that was sent on 04/24/23.  Please call or fax to [phone]

## 2023-05-17 NOTE — TELEPHONE ENCOUNTER
Called María from [facility] back regarding the pt's plan of care faxed over to us dated 04/24/23. I let her know that Dr. Palacio had signed the plan of care yesterday, and I would be faxing it over to her. I confirmed the fax number with her as well.

## 2023-05-23 ENCOUNTER — TELEPHONE (OUTPATIENT)
Dept: NEUROLOGY | Facility: CLINIC | Age: 53
End: 2023-05-23
Payer: COMMERCIAL

## 2023-05-23 NOTE — TELEPHONE ENCOUNTER
----- Message from Dinorah Lugo sent at 5/23/2023  3:27 PM CDT -----  Regarding: sign and date  Contact: [phone]  jill ahrdy PT calling regarding plan of care that need to sign and dated. call back [phone] and fax # [phone]

## 2023-05-29 ENCOUNTER — TELEPHONE (OUTPATIENT)
Dept: NEUROLOGY | Facility: CLINIC | Age: 53
End: 2023-05-29
Payer: COMMERCIAL

## 2023-05-29 ENCOUNTER — OFFICE VISIT (OUTPATIENT)
Dept: NEUROLOGY | Facility: CLINIC | Age: 53
End: 2023-05-29
Payer: COMMERCIAL

## 2023-05-29 DIAGNOSIS — R27.0 ATAXIA: ICD-10-CM

## 2023-05-29 DIAGNOSIS — F43.10 PTSD (POST-TRAUMATIC STRESS DISORDER): Primary | ICD-10-CM

## 2023-05-29 PROCEDURE — 99215 PR OFFICE/OUTPT VISIT, EST, LEVL V, 40-54 MIN: ICD-10-PCS | Mod: 95,,, | Performed by: PSYCHIATRY & NEUROLOGY

## 2023-05-29 PROCEDURE — 99215 OFFICE O/P EST HI 40 MIN: CPT | Mod: 95,,, | Performed by: PSYCHIATRY & NEUROLOGY

## 2023-05-29 NOTE — PROGRESS NOTES
"The patient location is: HOME  The chief complaint leading to visit is: ataxia  1. PTSD (post-traumatic stress disorder)  Ambulatory referral/consult to Psychiatry      2. Ataxia          Visit type: Virtual visit with synchronous audio and video  Total time spent with patient: 20  Each patient to whom he or she provides medical services by telemedicine is:  (1) informed of the relationship between the physician and patient and the respective role of any other health care provider with respect to management of the patient; and (2) notified that he or she may decline to receive medical services by telemedicine and may withdraw from such care at any time.     [facility]    PCP/Referring Provider: No referring provider defined for this encounter.  Date of Service: 5/29/2023    Chief Complaint: Ataxia    Interval Hx    Since last visit,   Started PT and ataxia resolved    Falls: none      "priorHPI: Vivian Peterson is a R HANDED 53 y.o. female with a medical issues significant for Hashimotos, Migraines, MAURO, Asthma, PNES, Orthostasis, coming for ataxia workup. Patient notes trouble walking progressive over her time. At this point needs to use a shopping cart to stay upright at grocery stores. Struggles with stairs. Typcially walks unassisted. Falls once a day. Runs into doorframes. Furniture surfs. Not enrolled in PT. In 2018 noes she had a "mini stroke" episode - 6 hrs could not talk and had R sided hemiparesiss which she later recovered from. Patient had delayed walking milestones and speaking.  No dysarthria noted.   Migraines make her symptoms worse.  EMU admit revealed no electrographic evidence of seizures with concern for PNES.  Mother had a movement disorder - bedbound by 66 but also had strokes  Daughter started falling at birth  Both her daughter and mother had migraines  Some word finding issues  No tremor  At times foot inversions    MRI 2018 was NL  EMU admit for staring spejoaquíns 2018 " "NL    Medication history:  -Failed topamax and elavil for migraine control    Neuroleptic exposure:  -None"    Review of Systems:   Review of Systems   Constitutional:  Negative for fever.   HENT:  Negative for congestion.    Eyes:  Negative for double vision.   Respiratory:  Negative for cough and shortness of breath.    Cardiovascular:  Negative for chest pain and leg swelling.   Gastrointestinal:  Negative for nausea.   Genitourinary:  Negative for dysuria.   Musculoskeletal:  Positive for falls.   Skin:  Negative for rash.   Neurological:  Negative for tremors, speech change and headaches.   Psychiatric/Behavioral:  Negative for depression. The patient is nervous/anxious.        Current Medications:  Outpatient Encounter Medications as of 5/29/2023   Medication Sig Dispense Refill    albuterol (PROVENTIL/VENTOLIN HFA) 90 mcg/actuation inhaler Inhale 2 puffs into the lungs every 6 (six) hours as needed for Wheezing. Rescue      aspirin (ECOTRIN) 81 MG EC tablet Take 81 mg by mouth once daily.      atorvastatin (LIPITOR) 20 MG tablet Take 20 mg by mouth every evening.      fluticasone-salmeterol 250-50 mcg/dose (ADVAIR) 250-50 mcg/dose diskus inhaler Inhale 1 puff into the lungs 2 (two) times daily. Controller      galcanezumab-gnlm 120 mg/mL PnIj Inject 120 mg into the skin every 28 days. maintenance dose (Patient not taking: Reported on 5/15/2023) 1 each 5    levothyroxine (SYNTHROID) 50 MCG tablet Take 50 mcg by mouth before breakfast.      linaCLOtide (LINZESS) 145 mcg Cap capsule Take 1 capsule (145 mcg total) by mouth before breakfast. 90 capsule 3    midodrine (PROAMATINE) 5 MG Tab Take 5 mg by mouth 2 (two) times daily with meals.      omeprazole (PRILOSEC) 20 MG capsule Take 1 capsule (20 mg total) by mouth every evening. 30 capsule 11    omeprazole (PRILOSEC) 40 MG capsule Take 1 capsule (40 mg total) by mouth once daily. 30 capsule 11    [DISCONTINUED] omeprazole (PRILOSEC) 20 MG capsule Take 1 capsule " (20 mg total) by mouth every evening. 30 capsule 11    [DISCONTINUED] omeprazole (PRILOSEC) 40 MG capsule Take 1 capsule (40 mg total) by mouth once daily. 30 capsule 11     No facility-administered encounter medications on file as of 5/29/2023.       Past Medical History:  Patient Active Problem List   Diagnosis    Orthostatic syncope    Prediabetes    Hypothyroidism due to Hashimoto's thyroiditis    HLD (hyperlipidemia)    GERD (gastroesophageal reflux disease)    Asthma    Migraines    MAURO (obstructive sleep apnea)    Altered mental status    Seizure-like activity    Gait disturbance    Ataxia    PTSD (post-traumatic stress disorder)       Past Surgical History:  Past Surgical History:   Procedure Laterality Date    APPENDECTOMY N/A     CHOLECYSTECTOMY N/A     COLONOSCOPY N/A     ENDOSCOPY N/A     SHOULDER SURGERY Bilateral     TONSILLECTOMY      TOTAL ABDOMINAL HYSTERECTOMY N/A        Social:  Social History     Socioeconomic History    Marital status:    Tobacco Use    Smoking status: Never    Smokeless tobacco: Never   Substance and Sexual Activity    Alcohol use: Never    Drug use: Never       Family History:  Family History   Problem Relation Age of Onset    Heart attack Mother     Colon cancer Paternal Aunt     Throat cancer Paternal Aunt     Throat cancer Paternal Uncle     Pancreatic cancer Paternal Grandmother     Stomach cancer Neg Hx     Liver cancer Neg Hx     Liver disease Neg Hx     Esophageal cancer Neg Hx     Crohn's disease Neg Hx     Ulcerative colitis Neg Hx        PHYSICAL:  There were no vitals taken for this visit.      Physical Exam  Constitutional: Well-developed, well-nourished, appears stated age  Eyes: No scleral icterus  ENT: Moist oral mucosa  Cardiovascular: No lower extremity edema   Respiratory: No labored breathing   Skin: No rash   Hematologic: No bruising    Other: GI/ deferred   Mental status: Alert and oriented to person, place, time, and situation;   follows  "commands  Speech: normal (not dysarthric), no aphasia  Cranial nerves:            CN II: Pupils mid-position and equal, not tested light or accommodation  CN III, IV, VI: Extraocular movements full, no nystagmus visualized  CN V: Not tested   CN VII: Face strong and symmetric bilaterally   CN VIII: Hearing intact to voice and conversation   CN IX, X: Palate raises midline and symmetric   CN XI: Strong shoulder shrug B/L  CN XII: Tongue appears midline   Motor: Normal bulk by appearance, no drift   Sensory: Not tested    Gait: Gait normal - can stand on either foot to confidently balance  Deep tendon reflexes: Not tested  Movement/Coordination                    No hypophonic speech.                     No facial masking.  No bradykinesia.                   No tremor with rest, posture, kinesis, or intention.                     No other dystonia, chorea, athetosis, myoclonus, or tics visualized.      "General Medical Examination:  General: Good hygiene, appropriate appearance.  HEENT: Normocephalic, atraumatic.   Neck: Supple.   Chest: Unlabored breathing.   CV: Symmetric pulses.   Ext: No clubbing, cyanosis, or edema.     Mental Status:  Mood/Affect: Appropriate/congruent.  Level of consciousness: Awake, alert.  Orientation: Oriented to person, place, time and situation.  Language: No Dysarthria    Cranial nerves:  I: Not tested  II: PERRL, VFF to counting  III, IV, VI: EOMI with conjugate gaze and no nystagmus on end gaze  V: Facial sensation intact and symmetric over the bilateral V1-V3  VII: Facial muscle activation intact and symmetric over the bilateral upper and lower face  VIII: Hearing intact in the b/l ears and symmetrical to finger rub  IX, X, XII: TUP midline - no atrophy or fasiculations  X: SCMs and shoulder shrug full strength b/l and symmetric    Motor:   -UE: 5/5 deltoids; 5/5 biceps, triceps; 5/5 wrist flexors, extensors; 5/5 interosseous; 5/5   -LEs: 5/5 hip flexion, extension; 5/5 knee " "flexion, extension; 5/5 ankle flexion, extension    B/L foot inversions that fluctuate  No tremor noted    DTRs:  ? Biceps Triceps Brachioradialis Knee Ankle   Left 2+   2+    Right 2+   2+        ? Finger taps Finger flicks JESUS ALBERTO Heel taps   Left 0 0 0 0   Right 0 0 0 0     Tone in limbs is mildly hypotonic    Sensation:   -Light touch: Intact and symmetric in the bilateral upper and lower extremities.    Coordination:   -Finger to nose: nl    Gait:  Ponderous astasia abasia gait"    Laboratory Data:  NA    EMU admit  EEG FINDINGS:  This record contains medium amplitude mixed frequencies with   well-formed alpha and beta activity seen during wakefulness with good   variability.  There is abundant beta activity seen in the 15 to 22 Hz range at   times.  This obscures the alpha rhythm at many points during the record.  The   record is symmetrical.  There are no epileptiform discharges seen at any point.    There are no seizures seen at any point.  Qualitatively normal sleep was   captured.  None of the patient's typical push-button events were visualized or   captured during this study.  There were no events noted of any kind.  There were   no electrographic seizures.     INTERPRETATION:  Normal awake and asleep EMU EEG.  No events were captured.       Imaging:  NA    Assessment//Plan:   Problem List Items Addressed This Visit          Neuro    Ataxia    Current Assessment & Plan     Report of progressive issues walking since youth with strong family history  On exam, no signs of PDism or cerebellar findings  Did PT and gait entirely normalized now - most consistent with FND    Suggested try to re-try to connect with a psychiatrist for PTSD care            Psychiatric    PTSD (post-traumatic stress disorder) - Primary    Current Assessment & Plan     See Ataxia           Relevant Orders    Ambulatory referral/consult to Psychiatry           Angelique Palacio MD, MS [facility]  Department of " Neurology  Movement Disorders

## 2023-05-29 NOTE — ASSESSMENT & PLAN NOTE
Report of progressive issues walking since youth with strong family history  On exam, no signs of PDism or cerebellar findings  Did PT and gait entirely normalized now - most consistent with FND  Continue PT    Suggested try to re-try to connect with a psychiatrist for PTSD care

## 2023-05-31 ENCOUNTER — TELEPHONE (OUTPATIENT)
Dept: NEUROLOGY | Facility: CLINIC | Age: 53
End: 2023-05-31
Payer: COMMERCIAL

## 2023-05-31 NOTE — TELEPHONE ENCOUNTER
----- Message from Nish Quintero sent at 5/31/2023  9:14 AM CDT -----  Regarding: plan of care  Contact: @[phone] fax#[phone]  Physical therapist calling in regards to needing plan of care signed states date was 4/24/23 needs asap ..... pls call and adv@[phone] fax#[phone]

## 2023-06-01 NOTE — TELEPHONE ENCOUNTER
Called PT back to inform them that I had faxed over the plan of care for this patient dated 04-24-23. No answer, left vm

## 2023-07-05 ENCOUNTER — OFFICE VISIT (OUTPATIENT)
Dept: GASTROENTEROLOGY | Facility: CLINIC | Age: 53
End: 2023-07-05
Payer: COMMERCIAL

## 2023-07-05 VITALS
WEIGHT: 147 LBS | BODY MASS INDEX: 28.86 KG/M2 | OXYGEN SATURATION: 98 % | SYSTOLIC BLOOD PRESSURE: 130 MMHG | HEIGHT: 60 IN | HEART RATE: 72 BPM | DIASTOLIC BLOOD PRESSURE: 81 MMHG

## 2023-07-05 DIAGNOSIS — Z86.010 PERSONAL HISTORY OF COLONIC POLYPS: ICD-10-CM

## 2023-07-05 DIAGNOSIS — Z86.010 HX OF COLONIC POLYP: ICD-10-CM

## 2023-07-05 DIAGNOSIS — R19.7 DIARRHEA, UNSPECIFIED TYPE: ICD-10-CM

## 2023-07-05 DIAGNOSIS — K58.2 IRRITABLE BOWEL SYNDROME WITH BOTH CONSTIPATION AND DIARRHEA: ICD-10-CM

## 2023-07-05 DIAGNOSIS — K59.04 CHRONIC IDIOPATHIC CONSTIPATION: ICD-10-CM

## 2023-07-05 DIAGNOSIS — K21.9 GASTROESOPHAGEAL REFLUX DISEASE, UNSPECIFIED WHETHER ESOPHAGITIS PRESENT: Primary | ICD-10-CM

## 2023-07-05 PROCEDURE — 99213 OFFICE O/P EST LOW 20 MIN: CPT | Mod: S$GLB,,, | Performed by: INTERNAL MEDICINE

## 2023-07-05 PROCEDURE — 99213 PR OFFICE/OUTPT VISIT, EST, LEVL III, 20-29 MIN: ICD-10-PCS | Mod: S$GLB,,, | Performed by: INTERNAL MEDICINE

## 2023-07-05 RX ORDER — TOPIRAMATE 100 MG/1
1 CAPSULE, EXTENDED RELEASE ORAL
COMMUNITY
Start: 2023-01-24 | End: 2023-07-26

## 2023-07-05 NOTE — PROGRESS NOTES
Clinic Note    Reason for visit:  The primary encounter diagnosis was Gastroesophageal reflux disease, unspecified whether esophagitis present. Diagnoses of Chronic idiopathic constipation, Personal history of colonic polyps, Hx of colonic polyp, Irritable bowel syndrome with both constipation and diarrhea, and Diarrhea, unspecified type were also pertinent to this visit.    PCP: Kyler Tate       HPI:  This is a 53 y.o. female here for follow up of dysphagia, constipation. On omeprazole 40mg daily and is still having reflux daily. She does note that reflux that is worse at night. Dysphagia resolved after dilation. Having bloating- avoids artificial sweeteners. Is worse with breads. She alternates between constipation and diarrhea. Unable to take linzess.       2/28/2023 EGD/Colonoscopy- small HH, schatzki ring with dilation bx with normal mucosa negative for EOE; long redundant colon, polyp (TA)    She has h/o bowel obstruction. Hysterectomy is only surgery she has had.  She had allergy testing and has a lot of environmental allergies and gets allergy injections once a week. She drinks lactose free milk. She has avoided gluten in the past and she did well but she is currently not doing gluten free diet. She was born at 27 weeks. She has h/o seizures due to stress and anxiety and states last seizure was in 2020.      Review of Systems   Constitutional:  Positive for fatigue and unexpected weight change. Negative for fever.   HENT:  Negative for mouth sores, postnasal drip, sore throat and trouble swallowing.    Eyes:  Negative for pain, discharge and eye dryness.   Respiratory:  Positive for apnea and shortness of breath. Negative for cough, choking, chest tightness and wheezing.    Cardiovascular:  Negative for chest pain, palpitations and leg swelling.   Gastrointestinal:  Positive for abdominal distention, abdominal pain, change in bowel habit, constipation, diarrhea, rectal pain, reflux and change in bowel  habit. Negative for anal bleeding, blood in stool, nausea, vomiting and fecal incontinence.   Genitourinary:  Negative for bladder incontinence, dysuria and hematuria.   Musculoskeletal:  Positive for back pain and joint swelling. Negative for arthralgias.   Integumentary:  Negative for color change and rash.   Allergic/Immunologic: Positive for environmental allergies and food allergies.   Neurological:  Negative for seizures and headaches.   Hematological:  Negative for adenopathy. Bruises/bleeds easily.      Past Medical History:   Diagnosis Date    Asthma     BMI 27.0-27.9,adult     Chronic constipation     Colon polyp     GERD (gastroesophageal reflux disease)     HLD (hyperlipidemia)     Hypothyroidism due to Hashimoto's thyroiditis     Migraines     MAURO (obstructive sleep apnea)     Prediabetes     Seizures     due to stress and anxiety    Unspecified hemorrhoids      Past Surgical History:   Procedure Laterality Date    APPENDECTOMY N/A     CHOLECYSTECTOMY N/A     COLONOSCOPY N/A     ENDOSCOPY N/A     SHOULDER SURGERY Bilateral     TONSILLECTOMY      TOTAL ABDOMINAL HYSTERECTOMY N/A      Family History   Problem Relation Age of Onset    Heart attack Mother     Colon cancer Paternal Aunt     Throat cancer Paternal Aunt     Throat cancer Paternal Uncle     Pancreatic cancer Paternal Grandmother     Stomach cancer Neg Hx     Liver cancer Neg Hx     Liver disease Neg Hx     Esophageal cancer Neg Hx     Crohn's disease Neg Hx     Ulcerative colitis Neg Hx      Social History     Tobacco Use    Smoking status: Never    Smokeless tobacco: Never   Substance Use Topics    Alcohol use: Never    Drug use: Never     Review of patient's allergies indicates:   Allergen Reactions    Penicillins Anaphylaxis, Hives and Shortness Of Breath    Sulfa (sulfonamide antibiotics) Anaphylaxis and Hives     Shortness of breath  Shortness of breath      Clarithromycin Rash and Hives        Medication List with Changes/Refills    Current Medications    ALBUTEROL (PROVENTIL/VENTOLIN HFA) 90 MCG/ACTUATION INHALER    Inhale 2 puffs into the lungs every 6 (six) hours as needed for Wheezing. Rescue    ASPIRIN (ECOTRIN) 81 MG EC TABLET    Take 81 mg by mouth once daily.    ATORVASTATIN (LIPITOR) 20 MG TABLET    Take 20 mg by mouth every evening.    GALCANEZUMAB-GNLM 120 MG/ML PNIJ    Inject 120 mg into the skin every 28 days. maintenance dose    LEVOTHYROXINE (SYNTHROID) 50 MCG TABLET    Take 50 mcg by mouth before breakfast.    LINACLOTIDE (LINZESS) 145 MCG CAP CAPSULE    Take 1 capsule (145 mcg total) by mouth before breakfast.    MIDODRINE (PROAMATINE) 5 MG TAB    Take 5 mg by mouth 2 (two) times daily with meals.    OMEPRAZOLE (PRILOSEC) 40 MG CAPSULE    Take 1 capsule (40 mg total) by mouth once daily.    TROKENDI  MG CP24    Take 1 capsule by mouth.   Discontinued Medications    FLUTICASONE-SALMETEROL 250-50 MCG/DOSE (ADVAIR) 250-50 MCG/DOSE DISKUS INHALER    Inhale 1 puff into the lungs 2 (two) times daily. Controller    OMEPRAZOLE (PRILOSEC) 20 MG CAPSULE    Take 1 capsule (20 mg total) by mouth every evening.         Vital Signs:  /81   Pulse 72   Ht 5' (1.524 m)   Wt 66.7 kg (147 lb)   SpO2 98%   BMI 28.71 kg/m²         Physical Exam  Vitals reviewed.   Constitutional:       General: She is awake. She is not in acute distress.     Appearance: Normal appearance. She is well-developed. She is not ill-appearing, toxic-appearing or diaphoretic.   HENT:      Head: Normocephalic and atraumatic.      Nose: Nose normal.      Mouth/Throat:      Mouth: Mucous membranes are moist.      Pharynx: Oropharynx is clear. No oropharyngeal exudate or posterior oropharyngeal erythema.   Eyes:      General: Lids are normal. Gaze aligned appropriately. No scleral icterus.        Right eye: No discharge.         Left eye: No discharge.      Conjunctiva/sclera: Conjunctivae normal.   Neck:      Trachea: Trachea normal.   Cardiovascular:       Rate and Rhythm: Normal rate and regular rhythm.      Pulses:           Radial pulses are 2+ on the right side and 2+ on the left side.   Pulmonary:      Effort: Pulmonary effort is normal. No respiratory distress.      Breath sounds: No stridor. No wheezing.   Chest:      Chest wall: No tenderness.   Abdominal:      General: Bowel sounds are normal. There is no distension.      Palpations: Abdomen is soft. There is no fluid wave, hepatomegaly or mass.      Tenderness: There is no abdominal tenderness. There is no guarding or rebound.   Musculoskeletal:         General: No tenderness or deformity.      Cervical back: Full passive range of motion without pain and neck supple. No tenderness.      Right lower leg: No edema.      Left lower leg: No edema.   Lymphadenopathy:      Cervical: No cervical adenopathy.   Skin:     General: Skin is warm and dry.      Capillary Refill: Capillary refill takes less than 2 seconds.      Coloration: Skin is not cyanotic, jaundiced or pale.   Neurological:      General: No focal deficit present.      Mental Status: She is alert and oriented to person, place, and time.      Motor: No tremor.   Psychiatric:         Attention and Perception: Attention normal.         Mood and Affect: Mood and affect normal.         Speech: Speech normal.         Behavior: Behavior normal. Behavior is cooperative.          All of the data above and below has been reviewed by myself and any further interpretations will be reflected in the assessment and plan.   The data includes review of external notes, and independent interpretation of lab results, procedures, x-rays, and imaging reports.      Assessment:  Gastroesophageal reflux disease, unspecified whether esophagitis present    Chronic idiopathic constipation    Personal history of colonic polyps    Hx of colonic polyp    Irritable bowel syndrome with both constipation and diarrhea    Diarrhea, unspecified type  -     Pancreatic elastase, fecal;  Future; Expected date: 07/05/2023         Recommendations:    Complete stool test  Sucrase breath test.  Start Stool softener twice daily      Risks, benefits, and alternatives of medical management, any associated procedures, and/or treatment discussed with the patient. Patient given opportunity to ask questions and voices understanding. Patient has elected to proceed with the recommended care modalities as discussed.    Instructed patient to notify my office if they have not been contacted within two weeks after any procedures, submitting any samples (biopsies, blood, stool, urine, etc.) or after any imaging (X-ray, CT, MRI, etc.).     Follow up in about 4 months (around 11/5/2023).    Order summary:  Orders Placed This Encounter   Procedures    Pancreatic elastase, fecal          This document may have been created using a voice recognition transcribing system. Incorrect words or phrases may have been missed during proofreading. Please interpret accordingly or contact me for clarification.     Bartolo Pang MD

## 2023-07-05 NOTE — PATIENT INSTRUCTIONS
Sucrase breath test.  Start Stool softener twice daily  Complete stool test    Please notify my office if you have not been contacted within two weeks after any procedures, submitting any samples (biopsies, blood, stool, urine, etc.) or after any imaging (X-ray, CT, MRI, etc.).

## 2023-07-12 ENCOUNTER — TELEPHONE (OUTPATIENT)
Dept: GASTROENTEROLOGY | Facility: CLINIC | Age: 53
End: 2023-07-12
Payer: COMMERCIAL

## 2023-07-12 NOTE — TELEPHONE ENCOUNTER
----- Message from Bartolo Pang MD sent at 7/11/2023 11:09 AM CDT -----  Call with results, SUCRASE ACTIVITY test  is normal-

## 2023-07-26 ENCOUNTER — OFFICE VISIT (OUTPATIENT)
Dept: OBSTETRICS AND GYNECOLOGY | Facility: CLINIC | Age: 53
End: 2023-07-26
Payer: COMMERCIAL

## 2023-07-26 VITALS — SYSTOLIC BLOOD PRESSURE: 105 MMHG | DIASTOLIC BLOOD PRESSURE: 77 MMHG | HEART RATE: 79 BPM

## 2023-07-26 DIAGNOSIS — E03.9 ACQUIRED HYPOTHYROIDISM: ICD-10-CM

## 2023-07-26 DIAGNOSIS — Z12.4 SCREENING FOR MALIGNANT NEOPLASM OF THE CERVIX: ICD-10-CM

## 2023-07-26 DIAGNOSIS — Z87.411 H/O VAGINAL DYSPLASIA: ICD-10-CM

## 2023-07-26 DIAGNOSIS — Z00.00 ANNUAL PHYSICAL EXAM: Primary | ICD-10-CM

## 2023-07-26 PROCEDURE — 99396 PREV VISIT EST AGE 40-64: CPT | Mod: S$GLB,,, | Performed by: OBSTETRICS & GYNECOLOGY

## 2023-07-26 PROCEDURE — 99396 PR PREVENTIVE VISIT,EST,40-64: ICD-10-PCS | Mod: S$GLB,,, | Performed by: OBSTETRICS & GYNECOLOGY

## 2023-07-26 NOTE — PROGRESS NOTES
Subjective     Patient ID: Vivian Peterson is a 53 y.o. female.    Chief Complaint: Well Woman    A 53-year-old female here for annual exam her main complaint is weight gain she is seeing an endocrinologist for hypothyroid I have referred her to a diet clinic.  She also has a history of a previous hysterectomy and then being followed [facility] [place] for vaginal HPV her last Pap smear was normal, she has had positive high-risk HPV and negative HPV  on the vaginal cuff    Gynecologic Exam  The patient's primary symptoms include pelvic pain. The patient's pertinent negatives include no genital itching, genital lesions, genital odor, genital rash, missed menses, vaginal bleeding or vaginal discharge. This is a recurrent problem. The current episode started in the past 7 days. The problem occurs intermittently. The problem has been unchanged. The pain is moderate. The problem affects both sides. She is not pregnant. Associated symptoms include constipation, frequency and urgency. Pertinent negatives include no abdominal pain, anorexia, back pain, chills, diarrhea, discolored urine, dysuria, fever, flank pain, headaches, hematuria, nausea, painful intercourse, rash or vomiting. The vaginal discharge was normal. There has been no bleeding. She has not been passing clots. She has not been passing tissue. The symptoms are aggravated by bowel movements, heavy lifting and tactile pressure. She has tried warm bath for the symptoms. The treatment provided mild relief. She is not sexually active. No, her partner does not have an STD. She uses nothing for contraception. She is postmenopausal. Her past medical history is significant for an abdominal surgery, an ectopic pregnancy, endometriosis, a gynecological surgery, menorrhagia, miscarriage, ovarian cysts and an STD. There is no history of a  section, herpes simplex, metrorrhagia, perineal abscess, PID, a terminated pregnancy or vaginosis.   Review of Systems    Constitutional:  Negative for activity change, appetite change, chills, fever and unexpected weight change.   HENT:  Negative for nasal congestion, dental problem, facial swelling, hearing loss and mouth sores.    Respiratory:  Negative for apnea, chest tightness, shortness of breath and wheezing.    Cardiovascular:  Negative for chest pain and leg swelling.   Gastrointestinal:  Positive for constipation. Negative for abdominal distention, abdominal pain, anal bleeding, anorexia, blood in stool, diarrhea, nausea, rectal pain and vomiting.   Genitourinary:  Positive for frequency, menorrhagia, pelvic pain and urgency. Negative for decreased urine volume, difficulty urinating, dyspareunia, dysuria, enuresis, flank pain, genital sores, hematuria, menstrual problem, missed menses, vaginal bleeding, vaginal discharge and vaginal pain.   Musculoskeletal:  Negative for arthralgias, back pain, joint swelling, myalgias and neck pain.   Integumentary:  Negative for color change, pallor, rash and wound.   Allergic/Immunologic: Negative for immunocompromised state.   Neurological:  Negative for dizziness, light-headedness and headaches.   Hematological:  Negative for adenopathy. Does not bruise/bleed easily.   Psychiatric/Behavioral:  Negative for agitation, behavioral problems, confusion, sleep disturbance and suicidal ideas. The patient is not nervous/anxious and is not hyperactive.         Objective     Physical Exam  Constitutional:       Appearance: She is well-developed.   HENT:      Head: Normocephalic.      Nose: Nose normal.   Eyes:      Conjunctiva/sclera: Conjunctivae normal.      Pupils: Pupils are equal, round, and reactive to light.   Cardiovascular:      Rate and Rhythm: Normal rate and regular rhythm.      Heart sounds: Normal heart sounds.   Pulmonary:      Effort: Pulmonary effort is normal.      Breath sounds: Normal breath sounds.   Abdominal:      General: Bowel sounds are normal.      Palpations: Abdomen  is soft.   Genitourinary:     Vagina: Normal.      Comments: Vulva vagina all normal I do not see any lesions on the vagina vinegar was placed on the vagina and I did not see any white areas or  abnormal areas   Pap smear was done  Musculoskeletal:         General: Normal range of motion.      Cervical back: Normal range of motion and neck supple.   Skin:     General: Skin is warm and dry.   Neurological:      Mental Status: She is alert and oriented to person, place, and time.   Psychiatric:         Behavior: Behavior normal.         Thought Content: Thought content normal.          Assessment and Plan     1. Annual physical exam    2. H/O vaginal dysplasia    3. Acquired hypothyroidism                 No follow-ups on file.

## 2023-07-27 ENCOUNTER — OUTSIDE PLACE OF SERVICE (OUTPATIENT)
Dept: PULMONOLOGY | Facility: CLINIC | Age: 53
End: 2023-07-27
Payer: COMMERCIAL

## 2023-07-30 ENCOUNTER — PATIENT MESSAGE (OUTPATIENT)
Dept: NEUROLOGY | Facility: CLINIC | Age: 53
End: 2023-07-30
Payer: COMMERCIAL

## 2023-07-31 LAB — Lab: NORMAL

## 2023-07-31 PROCEDURE — 95811 PR POLYSOMNOGRAPHY W/CPAP: ICD-10-PCS | Mod: 26,,, | Performed by: INTERNAL MEDICINE

## 2023-07-31 PROCEDURE — 95811 POLYSOM 6/>YRS CPAP 4/> PARM: CPT | Mod: 26,,, | Performed by: INTERNAL MEDICINE

## 2024-05-13 ENCOUNTER — PATIENT MESSAGE (OUTPATIENT)
Dept: NEUROLOGY | Facility: CLINIC | Age: 54
End: 2024-05-13
Payer: COMMERCIAL

## 2024-05-20 ENCOUNTER — TELEPHONE (OUTPATIENT)
Dept: NEUROLOGY | Facility: CLINIC | Age: 54
End: 2024-05-20
Payer: COMMERCIAL